# Patient Record
Sex: MALE | Race: WHITE | Employment: OTHER | URBAN - METROPOLITAN AREA
[De-identification: names, ages, dates, MRNs, and addresses within clinical notes are randomized per-mention and may not be internally consistent; named-entity substitution may affect disease eponyms.]

---

## 2017-03-13 ENCOUNTER — OFFICE VISIT (OUTPATIENT)
Dept: INTERNAL MEDICINE | Facility: CLINIC | Age: 72
End: 2017-03-13
Payer: COMMERCIAL

## 2017-03-13 VITALS
WEIGHT: 197 LBS | OXYGEN SATURATION: 97 % | TEMPERATURE: 97.7 F | HEART RATE: 93 BPM | BODY MASS INDEX: 24.62 KG/M2 | DIASTOLIC BLOOD PRESSURE: 98 MMHG | SYSTOLIC BLOOD PRESSURE: 146 MMHG

## 2017-03-13 DIAGNOSIS — H49.21 SIXTH NERVE PALSY OF RIGHT EYE: Primary | ICD-10-CM

## 2017-03-13 DIAGNOSIS — H53.2 DIPLOPIA: ICD-10-CM

## 2017-03-13 PROCEDURE — 99214 OFFICE O/P EST MOD 30 MIN: CPT | Performed by: INTERNAL MEDICINE

## 2017-03-13 NOTE — PROGRESS NOTES
SUBJECTIVE:                                                    Jose Vazquez is a 71 year old male who presents to clinic today for the following health issues:      Eye(s) Problem      Duration: about 1 week    Description:  Location: bilateral  Pain: YES- dual ache   Redness: no   Discharge: no     Accompanying signs and symptoms: headache - very dull    History (Trauma, foreign body exposure,): None    Precipitating or alleviating factors (contact use): None    Therapies tried and outcome: None     Pt reports that when he covers his R or L eye his symptoms resolve. His diplopia is horizontal.    Problem list and histories reviewed & adjusted, as indicated.  Additional history: as documented    Labs reviewed in EPIC    Reviewed and updated as needed this visit by clinical staff  Allergies       Reviewed and updated as needed this visit by Provider         ROS:  Constitutional, HEENT, cardiovascular, pulmonary, gi and gu systems are negative, except as otherwise noted.    OBJECTIVE:                                                    BP (!) 146/98  Pulse 93  Temp 97.7  F (36.5  C) (Oral)  Wt 197 lb (89.4 kg)  SpO2 97%  BMI 24.62 kg/m2  Body mass index is 24.62 kg/(m^2).  GENERAL APPEARANCE: alert and no distress  EYES: EOM- there is loss of abduction R eye. Eyes grossly normal to inspection, fundi benign-no diabetic or hypertensive changes seen and conjunctivae and sclerae normal  HENT: ear canals and TM's normal and nose and mouth without ulcers or lesions  NECK: no adenopathy, no asymmetry, masses, or scars and thyroid normal to palpation  RESP: lungs clear to auscultation - no rales, rhonchi or wheezes  CV: regular rates and rhythm, normal S1 S2, no S3 or S4 and no murmur, click or rub  MS: extremities normal- no gross deformities noted    Diagnostic test results:  none      ASSESSMENT/PLAN:                                                    1. Diplopia  2. Sixth nerve palsy of right eye  - OPHTHALMOLOGY ADULT  REFERRAL. TBS today  -probable MRI brain - but will await opthalmology eval first    Follow up with Provider - after opthalmology eval      Jcarlos Barbosa MD  [facility]

## 2017-03-13 NOTE — NURSING NOTE
"Chief Complaint   Patient presents with     Eye Problem     double vision       Initial BP (!) 146/98  Pulse 93  Temp 97.7  F (36.5  C) (Oral)  Wt 197 lb (89.4 kg)  SpO2 97%  BMI 24.62 kg/m2 Estimated body mass index is 24.62 kg/(m^2) as calculated from the following:    Height as of 1/12/16: 6' 3\" (1.905 m).    Weight as of this encounter: 197 lb (89.4 kg).  Medication Reconciliation: complete     Nasima Lebron, SHADI     "

## 2017-03-13 NOTE — MR AVS SNAPSHOT
After Visit Summary   3/13/2017    Jose Vazquez    MRN: [id number]           Patient Information     Date Of Birth          1945        Visit Information        Provider Department      3/13/2017 7:40 AM Jcarlos Barbosa MD [facility]        Today's Diagnoses     Sixth nerve palsy of right eye    -  1    Diplopia           Follow-ups after your visit        Additional Services     OPHTHALMOLOGY ADULT REFERRAL       Your provider has referred you to:   [facility]  [phone]  [email or web address]  FHN: [facility] [phone] [email or web address]    Please be aware that coverage of these services is subject to the terms and limitations of your health insurance plan.  Call member services at your health plan with any benefit or coverage questions.      Please bring the following with you to your appointment:    (1) Any X-Rays, CTs or MRIs which have been performed.  Contact the facility where they were done to arrange for  prior to your scheduled appointment.    (2) List of current medications  (3) This referral request   (4) Any documents/labs given to you for this referral                  Who to contact     If you have questions or need follow up information about today's clinic visit or your schedule please contact [facility] directly at [phone].  Normal or non-critical lab and imaging results will be communicated to you by MyChart, letter or phone within 4 business days after the clinic has received the results. If you do not hear from us within 7 days, please contact the clinic through MyChart or phone. If you have a critical or abnormal lab result, we will notify you by phone as soon as possible.  Submit refill requests through [employer] or call your pharmacy and they will forward the refill request to us. Please allow 3 business days  "for your refill to be completed.          Additional Information About Your Visit        [employer] Information     [facility] lets you send messages to your doctor, view your test results, renew your prescriptions, schedule appointments and more. To sign up, go to [email or web address] . Click on \"Log in\" on the left side of the screen, which will take you to the Welcome page. Then click on \"Sign up Now\" on the right side of the page.     You will be asked to enter the access code listed below, as well as some personal information. Please follow the directions to create your username and password.     Your access code is: [email or web address]  Expires: 2017  8:30 AM     Your access code will  in 90 days. If you need help or a new code, please call your [place] clinic or [phone].        Care EveryWhere ID     This is your [facility] EveryWhere ID. This could be used by other organizations to access your [place] medical records  [id number]        Your Vitals Were     Pulse Temperature Pulse Oximetry BMI (Body Mass Index)          93 97.7  F (36.5  C) (Oral) 97% 24.62 kg/m2         Blood Pressure from Last 3 Encounters:   17 (!) 146/98   16 132/86   16 (!) 172/100    Weight from Last 3 Encounters:   17 197 lb (89.4 kg)   16 185 lb 12.8 oz (84.3 kg)   16 179 lb 12.8 oz (81.6 kg)              We Performed the Following     OPHTHALMOLOGY ADULT REFERRAL        Primary Care Provider Office Phone # Fax #    Jcarlos Barbosa [phone]       [facility] [address] [facility] [address]        Thank you!     Thank you for choosing [facility]  for your care. Our goal is always to provide you with excellent care. Hearing back from our patients is one way we can continue to improve our services. Please take a few minutes to complete the written survey that you may receive in the mail after your visit with us. Thank you!      "        Your Updated Medication List - Protect others around you: Learn how to safely use, store and throw away your medicines at www.disposemymeds.org.          This list is accurate as of: 3/13/17  8:30 AM.  Always use your most recent med list.                   Brand Name Dispense Instructions for use    albuterol 108 (90 BASE) MCG/ACT Inhaler    PROAIR HFA/PROVENTIL HFA/VENTOLIN HFA    3 Inhaler    Inhale 2 puffs into the lungs every 4 hours as needed for shortness of breath / dyspnea or wheezing       fluticasone-salmeterol 250-50 MCG/DOSE diskus inhaler    ADVAIR    1 Inhaler    Inhale 1 puff into the lungs 2 times daily       lisinopril-hydrochlorothiazide 10-12.5 MG per tablet    PRINZIDE/ZESTORETIC    30 tablet    Take 1 tablet by mouth daily       tiotropium 2.5 MCG/ACT inhalation aerosol    SPIRIVA RESPIMAT    12 g    Inhale 2 puffs into the lungs daily

## 2017-03-17 ENCOUNTER — HOSPITAL ENCOUNTER (OUTPATIENT)
Dept: MRI IMAGING | Facility: CLINIC | Age: 72
End: 2017-03-17
Attending: OPHTHALMOLOGY
Payer: COMMERCIAL

## 2017-03-17 ENCOUNTER — HOSPITAL ENCOUNTER (OUTPATIENT)
Dept: MRI IMAGING | Facility: CLINIC | Age: 72
Discharge: HOME | End: 2017-03-17
Attending: OPHTHALMOLOGY | Admitting: OPHTHALMOLOGY
Payer: COMMERCIAL

## 2017-03-17 DIAGNOSIS — H49.20 SIXTH NERVE PALSY, UNSPECIFIED LATERALITY: ICD-10-CM

## 2017-03-17 PROCEDURE — 25500064 ZZH RX 255 OP 636: Performed by: OPHTHALMOLOGY

## 2017-03-17 PROCEDURE — A9585 GADOBUTROL INJECTION: HCPCS | Performed by: OPHTHALMOLOGY

## 2017-03-17 PROCEDURE — 70553 MRI BRAIN STEM W/O & W/DYE: CPT

## 2017-03-17 PROCEDURE — 70543 MRI ORBT/FAC/NCK W/O &W/DYE: CPT

## 2017-03-17 RX ORDER — GADOBUTROL 604.72 MG/ML
10 INJECTION INTRAVENOUS ONCE
Status: COMPLETED | OUTPATIENT
Start: 2017-03-17 | End: 2017-03-17

## 2017-03-17 RX ADMIN — GADOBUTROL 9 ML: 604.72 INJECTION INTRAVENOUS at 10:35

## 2017-06-01 ENCOUNTER — TRANSFERRED RECORDS (OUTPATIENT)
Dept: HEALTH INFORMATION MANAGEMENT | Facility: CLINIC | Age: 72
End: 2017-06-01

## 2018-09-18 ENCOUNTER — TRANSFERRED RECORDS (OUTPATIENT)
Dept: HEALTH INFORMATION MANAGEMENT | Facility: CLINIC | Age: 73
End: 2018-09-18

## 2019-12-19 ENCOUNTER — OFFICE VISIT (OUTPATIENT)
Dept: URGENT CARE | Facility: URGENT CARE | Age: 74
End: 2019-12-19
Payer: COMMERCIAL

## 2019-12-19 VITALS
DIASTOLIC BLOOD PRESSURE: 92 MMHG | TEMPERATURE: 97.8 F | RESPIRATION RATE: 16 BRPM | SYSTOLIC BLOOD PRESSURE: 142 MMHG | WEIGHT: 187 LBS | HEART RATE: 94 BPM | BODY MASS INDEX: 23.37 KG/M2 | OXYGEN SATURATION: 97 %

## 2019-12-19 DIAGNOSIS — H90.12 CONDUCTIVE HEARING LOSS OF LEFT EAR, UNSPECIFIED HEARING STATUS ON CONTRALATERAL SIDE: ICD-10-CM

## 2019-12-19 DIAGNOSIS — H61.22 IMPACTED CERUMEN OF LEFT EAR: Primary | ICD-10-CM

## 2019-12-19 PROCEDURE — 69209 REMOVE IMPACTED EAR WAX UNI: CPT | Mod: LT | Performed by: PHYSICIAN ASSISTANT

## 2019-12-20 NOTE — PROGRESS NOTES
SUBJECTIVE:  Jose Vazquez is a 74 year old male who presents with left ear blockage and wax for 1 week(s).   Severity: mild and moderate   Timing:still present  Additional symptoms include blockage in ear.      History of recurrent otitis: none    Past Medical History:   Diagnosis Date     Bladder stone      BPH (benign prostatic hyperplasia)      Right acetabular fracture (H)      Patient Active Problem List   Diagnosis     Health Care Home     Community acquired pneumonia     Benign essential hypertension     Pulmonary emphysema, unspecified emphysema type (H)     Right-sided low back pain with right-sided sciatica     Social History     Tobacco Use     Smoking status: Former Smoker     Packs/day: 1.00     Years: 42.00     Pack years: 42.00     Types: Cigarettes     Start date: 1/12/2016     Smokeless tobacco: Never Used   Substance Use Topics     Alcohol use: Yes     Alcohol/week: 0.0 standard drinks     Comment: rarely     Family History   Problem Relation Age of Onset     Family History Negative Mother      Family History Negative Father        ROS:   CONSTITUTIONAL:NEGATIVE for fever, chills, change in weight  INTEGUMENTARY/SKIN: NEGATIVE for worrisome rashes, moles or lesions  EYES: NEGATIVE for vision changes or irritation  ENT/MOUTH: POSITIVE for cerumen left ear  RESP:NEGATIVE for significant cough or SOB  CV: NEGATIVE for chest pain, palpitations or peripheral edema  NEURO: POSITIVE for left ear decreased hearing    OBJECTIVE:  BP (!) 142/92 (BP Location: Right arm)   Pulse 94   Temp 97.8  F (36.6  C) (Oral)   Resp 16   Wt 84.8 kg (187 lb)   SpO2 97%   BMI 23.37 kg/m     EXAM:  The right TM is normal: no effusions, no erythema, and normal landmarks     The right auditory canal is normal and without drainage, edema or erythema  The left TM is not visualized secondary to cerumen  The left auditory canal is obstructed with cerumen  Oropharynx exam is normal: no lesions, erythema, adenopathy or  exudate.  GENERAL: no acute distress  NECK: supple, non-tender to palpation, no adenopathy noted  RESP: lungs clear to auscultation - no rales, rhonchi or wheezes  CV: regular rates and rhythm, normal S1 S2, no murmur noted  SKIN: no suspicious lesions or rashes     ASSESSMENT/PLAN      ICD-10-CM    1. Impacted cerumen of left ear H61.22 HC REMOVAL IMPACTED CERUMEN IRRIGATION/LVG UNILAT   2. Conductive hearing loss of left ear, unspecified hearing status on contralateral side H90.12        Orders Placed This Encounter     HC REMOVAL IMPACTED CERUMEN IRRIGATION/LVG UNILAT     Procedure:  Cerumen removed with left ear by nurse

## 2021-01-14 ENCOUNTER — APPOINTMENT (OUTPATIENT)
Dept: CT IMAGING | Facility: CLINIC | Age: 76
DRG: 064 | End: 2021-01-14
Attending: EMERGENCY MEDICINE
Payer: COMMERCIAL

## 2021-01-14 ENCOUNTER — HOSPITAL ENCOUNTER (INPATIENT)
Facility: CLINIC | Age: 76
LOS: 5 days | Discharge: NURSING, REHAB OR LONG-TERM CARE | DRG: 064 | End: 2021-01-19
Attending: EMERGENCY MEDICINE | Admitting: INTERNAL MEDICINE
Payer: COMMERCIAL

## 2021-01-14 ENCOUNTER — APPOINTMENT (OUTPATIENT)
Dept: MRI IMAGING | Facility: CLINIC | Age: 76
DRG: 064 | End: 2021-01-14
Attending: PSYCHIATRY & NEUROLOGY
Payer: COMMERCIAL

## 2021-01-14 DIAGNOSIS — I63.9 ACUTE ISCHEMIC STROKE (H): ICD-10-CM

## 2021-01-14 DIAGNOSIS — J43.9 PULMONARY EMPHYSEMA, UNSPECIFIED EMPHYSEMA TYPE (H): ICD-10-CM

## 2021-01-14 DIAGNOSIS — I10 BENIGN ESSENTIAL HYPERTENSION: Primary | ICD-10-CM

## 2021-01-14 LAB
ALBUMIN SERPL-MCNC: 3.4 G/DL (ref 3.4–5)
ALP SERPL-CCNC: 99 U/L (ref 40–150)
ALT SERPL W P-5'-P-CCNC: 28 U/L (ref 0–70)
ANION GAP SERPL CALCULATED.3IONS-SCNC: 6 MMOL/L (ref 3–14)
ANION GAP SERPL CALCULATED.3IONS-SCNC: 8 MMOL/L (ref 3–14)
APTT PPP: 30 SEC (ref 22–37)
AST SERPL W P-5'-P-CCNC: 16 U/L (ref 0–45)
BASOPHILS # BLD AUTO: 0.1 10E9/L (ref 0–0.2)
BASOPHILS NFR BLD AUTO: 0.4 %
BILIRUB DIRECT SERPL-MCNC: 0.4 MG/DL (ref 0–0.2)
BILIRUB SERPL-MCNC: 1 MG/DL (ref 0.2–1.3)
BUN SERPL-MCNC: 22 MG/DL (ref 7–30)
BUN SERPL-MCNC: 22 MG/DL (ref 7–30)
CALCIUM SERPL-MCNC: 8.9 MG/DL (ref 8.5–10.1)
CALCIUM SERPL-MCNC: 9.6 MG/DL (ref 8.5–10.1)
CHLORIDE SERPL-SCNC: 103 MMOL/L (ref 94–109)
CHLORIDE SERPL-SCNC: 104 MMOL/L (ref 94–109)
CHOLEST SERPL-MCNC: 151 MG/DL
CO2 SERPL-SCNC: 28 MMOL/L (ref 20–32)
CO2 SERPL-SCNC: 31 MMOL/L (ref 20–32)
CREAT SERPL-MCNC: 1.26 MG/DL (ref 0.66–1.25)
CREAT SERPL-MCNC: 1.41 MG/DL (ref 0.66–1.25)
DIFFERENTIAL METHOD BLD: ABNORMAL
EOSINOPHIL # BLD AUTO: 0 10E9/L (ref 0–0.7)
EOSINOPHIL NFR BLD AUTO: 0.3 %
ERYTHROCYTE [DISTWIDTH] IN BLOOD BY AUTOMATED COUNT: 12.3 % (ref 10–15)
ERYTHROCYTE [DISTWIDTH] IN BLOOD BY AUTOMATED COUNT: 12.4 % (ref 10–15)
GFR SERPL CREATININE-BSD FRML MDRD: 49 ML/MIN/{1.73_M2}
GFR SERPL CREATININE-BSD FRML MDRD: 56 ML/MIN/{1.73_M2}
GLUCOSE SERPL-MCNC: 118 MG/DL (ref 70–99)
GLUCOSE SERPL-MCNC: 119 MG/DL (ref 70–99)
HBA1C MFR BLD: 5.6 % (ref 0–5.6)
HCT VFR BLD AUTO: 45.4 % (ref 40–53)
HCT VFR BLD AUTO: 49.7 % (ref 40–53)
HDLC SERPL-MCNC: 39 MG/DL
HGB BLD-MCNC: 15.2 G/DL (ref 13.3–17.7)
HGB BLD-MCNC: 16.3 G/DL (ref 13.3–17.7)
IMM GRANULOCYTES # BLD: 0.1 10E9/L (ref 0–0.4)
IMM GRANULOCYTES NFR BLD: 0.5 %
INR PPP: 1.12 (ref 0.86–1.14)
INTERPRETATION ECG - MUSE: NORMAL
LACTATE BLD-SCNC: 1.6 MMOL/L (ref 0.7–2)
LDLC SERPL CALC-MCNC: 93 MG/DL
LYMPHOCYTES # BLD AUTO: 1.1 10E9/L (ref 0.8–5.3)
LYMPHOCYTES NFR BLD AUTO: 8.7 %
MAGNESIUM SERPL-MCNC: 2.2 MG/DL (ref 1.6–2.3)
MCH RBC QN AUTO: 28.9 PG (ref 26.5–33)
MCH RBC QN AUTO: 29 PG (ref 26.5–33)
MCHC RBC AUTO-ENTMCNC: 32.8 G/DL (ref 31.5–36.5)
MCHC RBC AUTO-ENTMCNC: 33.5 G/DL (ref 31.5–36.5)
MCV RBC AUTO: 87 FL (ref 78–100)
MCV RBC AUTO: 88 FL (ref 78–100)
MONOCYTES # BLD AUTO: 1.4 10E9/L (ref 0–1.3)
MONOCYTES NFR BLD AUTO: 10.8 %
NEUTROPHILS # BLD AUTO: 9.9 10E9/L (ref 1.6–8.3)
NEUTROPHILS NFR BLD AUTO: 79.3 %
NONHDLC SERPL-MCNC: 112 MG/DL
NRBC # BLD AUTO: 0 10*3/UL
NRBC BLD AUTO-RTO: 0 /100
PLATELET # BLD AUTO: 249 10E9/L (ref 150–450)
PLATELET # BLD AUTO: 298 10E9/L (ref 150–450)
POTASSIUM SERPL-SCNC: 2.8 MMOL/L (ref 3.4–5.3)
POTASSIUM SERPL-SCNC: 2.9 MMOL/L (ref 3.4–5.3)
PROT SERPL-MCNC: 7.7 G/DL (ref 6.8–8.8)
RBC # BLD AUTO: 5.25 10E12/L (ref 4.4–5.9)
RBC # BLD AUTO: 5.64 10E12/L (ref 4.4–5.9)
SODIUM SERPL-SCNC: 139 MMOL/L (ref 133–144)
SODIUM SERPL-SCNC: 141 MMOL/L (ref 133–144)
TRIGL SERPL-MCNC: 93 MG/DL
WBC # BLD AUTO: 12.5 10E9/L (ref 4–11)
WBC # BLD AUTO: 12.6 10E9/L (ref 4–11)

## 2021-01-14 PROCEDURE — 250N000011 HC RX IP 250 OP 636: Performed by: EMERGENCY MEDICINE

## 2021-01-14 PROCEDURE — 80048 BASIC METABOLIC PNL TOTAL CA: CPT | Performed by: INTERNAL MEDICINE

## 2021-01-14 PROCEDURE — 250N000013 HC RX MED GY IP 250 OP 250 PS 637: Performed by: EMERGENCY MEDICINE

## 2021-01-14 PROCEDURE — 83735 ASSAY OF MAGNESIUM: CPT | Performed by: INTERNAL MEDICINE

## 2021-01-14 PROCEDURE — 0042T CT HEAD PERFUSION WITH CONTRAST: CPT

## 2021-01-14 PROCEDURE — A9585 GADOBUTROL INJECTION: HCPCS | Performed by: EMERGENCY MEDICINE

## 2021-01-14 PROCEDURE — 87635 SARS-COV-2 COVID-19 AMP PRB: CPT | Performed by: INTERNAL MEDICINE

## 2021-01-14 PROCEDURE — 83036 HEMOGLOBIN GLYCOSYLATED A1C: CPT | Performed by: INTERNAL MEDICINE

## 2021-01-14 PROCEDURE — 99223 1ST HOSP IP/OBS HIGH 75: CPT | Mod: AI | Performed by: INTERNAL MEDICINE

## 2021-01-14 PROCEDURE — G0508 CRIT CARE TELEHEA CONSULT 60: HCPCS | Mod: G0 | Performed by: PSYCHIATRY & NEUROLOGY

## 2021-01-14 PROCEDURE — 70496 CT ANGIOGRAPHY HEAD: CPT

## 2021-01-14 PROCEDURE — 255N000002 HC RX 255 OP 636: Performed by: EMERGENCY MEDICINE

## 2021-01-14 PROCEDURE — 96375 TX/PRO/DX INJ NEW DRUG ADDON: CPT

## 2021-01-14 PROCEDURE — 36415 COLL VENOUS BLD VENIPUNCTURE: CPT | Performed by: INTERNAL MEDICINE

## 2021-01-14 PROCEDURE — 99291 CRITICAL CARE FIRST HOUR: CPT | Mod: 25

## 2021-01-14 PROCEDURE — 83605 ASSAY OF LACTIC ACID: CPT | Performed by: INTERNAL MEDICINE

## 2021-01-14 PROCEDURE — 258N000003 HC RX IP 258 OP 636: Performed by: INTERNAL MEDICINE

## 2021-01-14 PROCEDURE — 70450 CT HEAD/BRAIN W/O DYE: CPT

## 2021-01-14 PROCEDURE — 120N000001 HC R&B MED SURG/OB

## 2021-01-14 PROCEDURE — 70553 MRI BRAIN STEM W/O & W/DYE: CPT

## 2021-01-14 PROCEDURE — 93005 ELECTROCARDIOGRAM TRACING: CPT

## 2021-01-14 PROCEDURE — 85610 PROTHROMBIN TIME: CPT | Performed by: EMERGENCY MEDICINE

## 2021-01-14 PROCEDURE — 80076 HEPATIC FUNCTION PANEL: CPT | Performed by: INTERNAL MEDICINE

## 2021-01-14 PROCEDURE — 80061 LIPID PANEL: CPT | Performed by: INTERNAL MEDICINE

## 2021-01-14 PROCEDURE — 80048 BASIC METABOLIC PNL TOTAL CA: CPT | Performed by: EMERGENCY MEDICINE

## 2021-01-14 PROCEDURE — 250N000011 HC RX IP 250 OP 636: Performed by: HOSPITALIST

## 2021-01-14 PROCEDURE — 85730 THROMBOPLASTIN TIME PARTIAL: CPT | Performed by: EMERGENCY MEDICINE

## 2021-01-14 PROCEDURE — 96374 THER/PROPH/DIAG INJ IV PUSH: CPT | Mod: 59

## 2021-01-14 PROCEDURE — 85027 COMPLETE CBC AUTOMATED: CPT | Performed by: INTERNAL MEDICINE

## 2021-01-14 PROCEDURE — 85025 COMPLETE CBC W/AUTO DIFF WBC: CPT | Performed by: EMERGENCY MEDICINE

## 2021-01-14 PROCEDURE — 250N000009 HC RX 250: Performed by: EMERGENCY MEDICINE

## 2021-01-14 RX ORDER — LABETALOL HYDROCHLORIDE 5 MG/ML
20 INJECTION, SOLUTION INTRAVENOUS ONCE
Status: COMPLETED | OUTPATIENT
Start: 2021-01-14 | End: 2021-01-14

## 2021-01-14 RX ORDER — HYDRALAZINE HYDROCHLORIDE 20 MG/ML
10-20 INJECTION INTRAMUSCULAR; INTRAVENOUS
Status: DISCONTINUED | OUTPATIENT
Start: 2021-01-14 | End: 2021-01-19 | Stop reason: HOSPADM

## 2021-01-14 RX ORDER — ACETAMINOPHEN 325 MG/1
650 TABLET ORAL EVERY 4 HOURS PRN
Status: DISCONTINUED | OUTPATIENT
Start: 2021-01-14 | End: 2021-01-19 | Stop reason: HOSPADM

## 2021-01-14 RX ORDER — GADOBUTROL 604.72 MG/ML
8 INJECTION INTRAVENOUS ONCE
Status: COMPLETED | OUTPATIENT
Start: 2021-01-14 | End: 2021-01-14

## 2021-01-14 RX ORDER — ASPIRIN 325 MG
325 TABLET ORAL ONCE
Status: COMPLETED | OUTPATIENT
Start: 2021-01-14 | End: 2021-01-14

## 2021-01-14 RX ORDER — PROCHLORPERAZINE MALEATE 5 MG
5 TABLET ORAL EVERY 6 HOURS PRN
Status: DISCONTINUED | OUTPATIENT
Start: 2021-01-14 | End: 2021-01-19 | Stop reason: HOSPADM

## 2021-01-14 RX ORDER — POTASSIUM CHLORIDE 7.45 MG/ML
10 INJECTION INTRAVENOUS
Status: COMPLETED | OUTPATIENT
Start: 2021-01-14 | End: 2021-01-15

## 2021-01-14 RX ORDER — ONDANSETRON 2 MG/ML
4 INJECTION INTRAMUSCULAR; INTRAVENOUS EVERY 6 HOURS PRN
Status: DISCONTINUED | OUTPATIENT
Start: 2021-01-14 | End: 2021-01-19 | Stop reason: HOSPADM

## 2021-01-14 RX ORDER — PROCHLORPERAZINE 25 MG
12.5 SUPPOSITORY, RECTAL RECTAL EVERY 12 HOURS PRN
Status: DISCONTINUED | OUTPATIENT
Start: 2021-01-14 | End: 2021-01-19 | Stop reason: HOSPADM

## 2021-01-14 RX ORDER — SODIUM CHLORIDE 9 MG/ML
INJECTION, SOLUTION INTRAVENOUS CONTINUOUS
Status: ACTIVE | OUTPATIENT
Start: 2021-01-14 | End: 2021-01-15

## 2021-01-14 RX ORDER — ALBUTEROL SULFATE 90 UG/1
2 AEROSOL, METERED RESPIRATORY (INHALATION) EVERY 4 HOURS PRN
Status: DISCONTINUED | OUTPATIENT
Start: 2021-01-14 | End: 2021-01-19 | Stop reason: HOSPADM

## 2021-01-14 RX ORDER — POLYETHYLENE GLYCOL 3350 17 G/17G
17 POWDER, FOR SOLUTION ORAL DAILY PRN
Status: DISCONTINUED | OUTPATIENT
Start: 2021-01-14 | End: 2021-01-19 | Stop reason: HOSPADM

## 2021-01-14 RX ORDER — LABETALOL HYDROCHLORIDE 5 MG/ML
10-40 INJECTION, SOLUTION INTRAVENOUS EVERY 10 MIN PRN
Status: DISCONTINUED | OUTPATIENT
Start: 2021-01-14 | End: 2021-01-19 | Stop reason: HOSPADM

## 2021-01-14 RX ORDER — POTASSIUM CHLORIDE 7.45 MG/ML
10 INJECTION INTRAVENOUS ONCE
Status: COMPLETED | OUTPATIENT
Start: 2021-01-14 | End: 2021-01-14

## 2021-01-14 RX ORDER — ONDANSETRON 4 MG/1
4 TABLET, ORALLY DISINTEGRATING ORAL EVERY 6 HOURS PRN
Status: DISCONTINUED | OUTPATIENT
Start: 2021-01-14 | End: 2021-01-19 | Stop reason: HOSPADM

## 2021-01-14 RX ORDER — ATORVASTATIN CALCIUM 40 MG/1
40 TABLET, FILM COATED ORAL EVERY EVENING
Status: DISCONTINUED | OUTPATIENT
Start: 2021-01-14 | End: 2021-01-19 | Stop reason: HOSPADM

## 2021-01-14 RX ORDER — IOPAMIDOL 755 MG/ML
120 INJECTION, SOLUTION INTRAVASCULAR ONCE
Status: COMPLETED | OUTPATIENT
Start: 2021-01-14 | End: 2021-01-14

## 2021-01-14 RX ADMIN — POTASSIUM CHLORIDE 10 MEQ: 7.46 INJECTION, SOLUTION INTRAVENOUS at 17:53

## 2021-01-14 RX ADMIN — SODIUM CHLORIDE: 9 INJECTION, SOLUTION INTRAVENOUS at 19:02

## 2021-01-14 RX ADMIN — LABETALOL HYDROCHLORIDE 20 MG: 5 INJECTION, SOLUTION INTRAVENOUS at 18:15

## 2021-01-14 RX ADMIN — POTASSIUM CHLORIDE 10 MEQ: 7.46 INJECTION, SOLUTION INTRAVENOUS at 23:07

## 2021-01-14 RX ADMIN — IOPAMIDOL 120 ML: 755 INJECTION, SOLUTION INTRAVENOUS at 15:01

## 2021-01-14 RX ADMIN — GADOBUTROL 8 ML: 604.72 INJECTION INTRAVENOUS at 16:53

## 2021-01-14 RX ADMIN — ASPIRIN 325 MG ORAL TABLET 325 MG: 325 PILL ORAL at 18:12

## 2021-01-14 RX ADMIN — SODIUM CHLORIDE 100 ML: 9 INJECTION, SOLUTION INTRAVENOUS at 15:01

## 2021-01-14 RX ADMIN — POTASSIUM CHLORIDE 10 MEQ: 7.46 INJECTION, SOLUTION INTRAVENOUS at 21:57

## 2021-01-14 ASSESSMENT — ENCOUNTER SYMPTOMS
HEADACHES: 0
NAUSEA: 0
SPEECH DIFFICULTY: 1
DECREASED CONCENTRATION: 1

## 2021-01-14 ASSESSMENT — MIFFLIN-ST. JEOR: SCORE: 1574.08

## 2021-01-14 ASSESSMENT — ACTIVITIES OF DAILY LIVING (ADL): ADLS_ACUITY_SCORE: 12
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
## 2021-01-14 NOTE — ED NOTES
Bed: ST03  Expected date:   Expected time:   Means of arrival:   Comments:  [facility] - 517 - 75 M stroke alert no covid eta [id number]
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
## 2021-01-14 NOTE — ED NOTES
Bed: ST02  Expected date:   Expected time:   Means of arrival:   Comments:  Marixa - 514 - 89 F stroke alert eta [id number]

## 2021-01-14 NOTE — ED NOTES
[facility]  ED Nurse Handoff Report    ED Chief complaint: Cerebrovascular Accident      ED Diagnosis:   Final diagnoses:   None       Code Status: Full Code    Allergies: No Known Allergies    Patient Story: Patient comes in with stroke like s/s.  CT done, symptoms have resolved.  Patient getting MRI, VSS.   Labs Ordered and Resulted from Time of ED Arrival Up to the Time of Departure from the ED   BASIC METABOLIC PANEL - Abnormal; Notable for the following components:       Result Value    Potassium 2.8 (*)     Glucose 119 (*)     Creatinine 1.41 (*)     GFR Estimate 49 (*)     GFR Estimate If Black 57 (*)     All other components within normal limits   CBC WITH PLATELETS DIFFERENTIAL - Abnormal; Notable for the following components:    WBC 12.5 (*)     Absolute Neutrophil 9.9 (*)     Absolute Monocytes 1.4 (*)     All other components within normal limits   INR   PARTIAL THROMBOPLASTIN TIME   VITAL SIGNS AND NEURO CHECKS       Focused Assessment:  Neuro    Treatments and/or interventions provided: Mon[place]ing  Patient's response to treatments and/or interventions: fair    To be done/followed up on inpatient unit:  NA    Does this patient have any cognitive concerns?: Alert and Orientated x4    Activity level - Baseline/Home:  Independent  Activity Level - Current:   Stand with Assist    Patient's Preferred language: English   Needed?: No    Isolation: None  Infection: Not Applicable  Patient tested for COVID 19 prior to admission: YES  Bariatric?: No    Vital Signs:   Vitals:    01/14/21 1454 01/14/21 1455 01/14/21 1530 01/14/21 1545   BP:   (!) 137/90 (!) 134/106   Pulse:  106 113 101   Resp:  28 28 15   Temp: 98.7  F (37.1  C)      TempSrc: Oral      SpO2:   94%        Cardiac Rhythm:     Was the PSS-3 completed:   Yes  What interventions are required if any?               Family Comments: NA  OBS brochure/video discussed/provided to patient/family: Yes              Name of person  given brochure if not patient: NA              Relationship to patient: NA    For the majority of the shift this patient's behavior was Green.   Behavioral interventions performed were NA.    ED NURSE PHONE NUMBER: [id number]       '

## 2021-01-15 ENCOUNTER — APPOINTMENT (OUTPATIENT)
Dept: SPEECH THERAPY | Facility: CLINIC | Age: 76
DRG: 064 | End: 2021-01-15
Attending: INTERNAL MEDICINE
Payer: COMMERCIAL

## 2021-01-15 ENCOUNTER — APPOINTMENT (OUTPATIENT)
Dept: OCCUPATIONAL THERAPY | Facility: CLINIC | Age: 76
DRG: 064 | End: 2021-01-15
Attending: INTERNAL MEDICINE
Payer: COMMERCIAL

## 2021-01-15 ENCOUNTER — APPOINTMENT (OUTPATIENT)
Dept: CARDIOLOGY | Facility: CLINIC | Age: 76
DRG: 064 | End: 2021-01-15
Attending: INTERNAL MEDICINE
Payer: COMMERCIAL

## 2021-01-15 ENCOUNTER — APPOINTMENT (OUTPATIENT)
Dept: PHYSICAL THERAPY | Facility: CLINIC | Age: 76
DRG: 064 | End: 2021-01-15
Attending: INTERNAL MEDICINE
Payer: COMMERCIAL

## 2021-01-15 ENCOUNTER — HOSPITAL ENCOUNTER (OUTPATIENT)
Dept: NEUROLOGY | Facility: CLINIC | Age: 76
DRG: 064 | End: 2021-01-15
Attending: PHYSICIAN ASSISTANT
Payer: COMMERCIAL

## 2021-01-15 LAB
ANION GAP SERPL CALCULATED.3IONS-SCNC: 5 MMOL/L (ref 3–14)
BASOPHILS # BLD AUTO: 0.1 10E9/L (ref 0–0.2)
BASOPHILS NFR BLD AUTO: 0.6 %
BUN SERPL-MCNC: 21 MG/DL (ref 7–30)
CALCIUM SERPL-MCNC: 8.8 MG/DL (ref 8.5–10.1)
CHLORIDE SERPL-SCNC: 107 MMOL/L (ref 94–109)
CO2 SERPL-SCNC: 28 MMOL/L (ref 20–32)
CREAT SERPL-MCNC: 1.1 MG/DL (ref 0.66–1.25)
DIFFERENTIAL METHOD BLD: NORMAL
EOSINOPHIL # BLD AUTO: 0.1 10E9/L (ref 0–0.7)
EOSINOPHIL NFR BLD AUTO: 1.3 %
ERYTHROCYTE [DISTWIDTH] IN BLOOD BY AUTOMATED COUNT: 12.5 % (ref 10–15)
GFR SERPL CREATININE-BSD FRML MDRD: 66 ML/MIN/{1.73_M2}
GLUCOSE BLDC GLUCOMTR-MCNC: 103 MG/DL (ref 70–99)
GLUCOSE BLDC GLUCOMTR-MCNC: 107 MG/DL (ref 70–99)
GLUCOSE BLDC GLUCOMTR-MCNC: 112 MG/DL (ref 70–99)
GLUCOSE SERPL-MCNC: 107 MG/DL (ref 70–99)
HCT VFR BLD AUTO: 43.6 % (ref 40–53)
HGB BLD-MCNC: 14.5 G/DL (ref 13.3–17.7)
IMM GRANULOCYTES # BLD: 0.1 10E9/L (ref 0–0.4)
IMM GRANULOCYTES NFR BLD: 0.6 %
LABORATORY COMMENT REPORT: NORMAL
LYMPHOCYTES # BLD AUTO: 1.1 10E9/L (ref 0.8–5.3)
LYMPHOCYTES NFR BLD AUTO: 10.6 %
MCH RBC QN AUTO: 29.1 PG (ref 26.5–33)
MCHC RBC AUTO-ENTMCNC: 33.3 G/DL (ref 31.5–36.5)
MCV RBC AUTO: 87 FL (ref 78–100)
MONOCYTES # BLD AUTO: 1.1 10E9/L (ref 0–1.3)
MONOCYTES NFR BLD AUTO: 10.4 %
NEUTROPHILS # BLD AUTO: 7.9 10E9/L (ref 1.6–8.3)
NEUTROPHILS NFR BLD AUTO: 76.5 %
NRBC # BLD AUTO: 0 10*3/UL
NRBC BLD AUTO-RTO: 0 /100
PLATELET # BLD AUTO: 235 10E9/L (ref 150–450)
POTASSIUM SERPL-SCNC: 3.2 MMOL/L (ref 3.4–5.3)
POTASSIUM SERPL-SCNC: 3.4 MMOL/L (ref 3.4–5.3)
POTASSIUM SERPL-SCNC: 3.4 MMOL/L (ref 3.4–5.3)
RBC # BLD AUTO: 4.99 10E12/L (ref 4.4–5.9)
SARS-COV-2 RNA RESP QL NAA+PROBE: NEGATIVE
SODIUM SERPL-SCNC: 140 MMOL/L (ref 133–144)
SPECIMEN SOURCE: NORMAL
WBC # BLD AUTO: 10.3 10E9/L (ref 4–11)

## 2021-01-15 PROCEDURE — 999N000052 EEG VIDEO 12-26 HR UNMONITORED

## 2021-01-15 PROCEDURE — 84132 ASSAY OF SERUM POTASSIUM: CPT | Performed by: HOSPITALIST

## 2021-01-15 PROCEDURE — 250N000013 HC RX MED GY IP 250 OP 250 PS 637: Performed by: INTERNAL MEDICINE

## 2021-01-15 PROCEDURE — 250N000011 HC RX IP 250 OP 636: Performed by: HOSPITALIST

## 2021-01-15 PROCEDURE — 99233 SBSQ HOSP IP/OBS HIGH 50: CPT | Performed by: PSYCHIATRY & NEUROLOGY

## 2021-01-15 PROCEDURE — 97166 OT EVAL MOD COMPLEX 45 MIN: CPT | Mod: GO | Performed by: OCCUPATIONAL THERAPIST

## 2021-01-15 PROCEDURE — 120N000001 HC R&B MED SURG/OB

## 2021-01-15 PROCEDURE — 250N000013 HC RX MED GY IP 250 OP 250 PS 637: Performed by: HOSPITALIST

## 2021-01-15 PROCEDURE — 999N001017 HC STATISTIC GLUCOSE BY METER IP

## 2021-01-15 PROCEDURE — 92526 ORAL FUNCTION THERAPY: CPT | Mod: GN | Performed by: SPEECH-LANGUAGE PATHOLOGIST

## 2021-01-15 PROCEDURE — 85025 COMPLETE CBC W/AUTO DIFF WBC: CPT | Performed by: INTERNAL MEDICINE

## 2021-01-15 PROCEDURE — 999N000208 ECHOCARDIOGRAM COMPLETE

## 2021-01-15 PROCEDURE — 97535 SELF CARE MNGMENT TRAINING: CPT | Mod: GO | Performed by: OCCUPATIONAL THERAPIST

## 2021-01-15 PROCEDURE — 97116 GAIT TRAINING THERAPY: CPT | Mod: GP | Performed by: PHYSICAL THERAPIST

## 2021-01-15 PROCEDURE — 36415 COLL VENOUS BLD VENIPUNCTURE: CPT | Performed by: HOSPITALIST

## 2021-01-15 PROCEDURE — 80048 BASIC METABOLIC PNL TOTAL CA: CPT | Performed by: INTERNAL MEDICINE

## 2021-01-15 PROCEDURE — 97530 THERAPEUTIC ACTIVITIES: CPT | Mod: GP | Performed by: PHYSICAL THERAPIST

## 2021-01-15 PROCEDURE — 255N000002 HC RX 255 OP 636: Performed by: INTERNAL MEDICINE

## 2021-01-15 PROCEDURE — 99232 SBSQ HOSP IP/OBS MODERATE 35: CPT | Performed by: HOSPITALIST

## 2021-01-15 PROCEDURE — 250N000013 HC RX MED GY IP 250 OP 250 PS 637: Performed by: PHYSICIAN ASSISTANT

## 2021-01-15 PROCEDURE — 95720 EEG PHY/QHP EA INCR W/VEEG: CPT | Performed by: PSYCHIATRY & NEUROLOGY

## 2021-01-15 PROCEDURE — 36415 COLL VENOUS BLD VENIPUNCTURE: CPT | Performed by: INTERNAL MEDICINE

## 2021-01-15 PROCEDURE — 97161 PT EVAL LOW COMPLEX 20 MIN: CPT | Mod: GP | Performed by: PHYSICAL THERAPIST

## 2021-01-15 PROCEDURE — 93306 TTE W/DOPPLER COMPLETE: CPT | Mod: 26 | Performed by: INTERNAL MEDICINE

## 2021-01-15 PROCEDURE — 92610 EVALUATE SWALLOWING FUNCTION: CPT | Mod: GN | Performed by: SPEECH-LANGUAGE PATHOLOGIST

## 2021-01-15 RX ORDER — POTASSIUM CHLORIDE 1.5 G/1.58G
40 POWDER, FOR SOLUTION ORAL ONCE
Status: COMPLETED | OUTPATIENT
Start: 2021-01-15 | End: 2021-01-15

## 2021-01-15 RX ORDER — POTASSIUM CHLORIDE 1500 MG/1
40 TABLET, EXTENDED RELEASE ORAL ONCE
Status: CANCELLED | OUTPATIENT
Start: 2021-01-15 | End: 2021-01-15

## 2021-01-15 RX ORDER — POTASSIUM CHLORIDE 1500 MG/1
40 TABLET, EXTENDED RELEASE ORAL ONCE
Status: COMPLETED | OUTPATIENT
Start: 2021-01-15 | End: 2021-01-15

## 2021-01-15 RX ORDER — ASPIRIN 325 MG
325 TABLET ORAL DAILY
Status: DISCONTINUED | OUTPATIENT
Start: 2021-01-15 | End: 2021-01-19 | Stop reason: HOSPADM

## 2021-01-15 RX ADMIN — POTASSIUM CHLORIDE 40 MEQ: 1500 TABLET, EXTENDED RELEASE ORAL at 18:26

## 2021-01-15 RX ADMIN — POTASSIUM CHLORIDE 10 MEQ: 7.46 INJECTION, SOLUTION INTRAVENOUS at 01:34

## 2021-01-15 RX ADMIN — POTASSIUM CHLORIDE 10 MEQ: 7.46 INJECTION, SOLUTION INTRAVENOUS at 03:09

## 2021-01-15 RX ADMIN — ATORVASTATIN CALCIUM 40 MG: 40 TABLET, FILM COATED ORAL at 19:38

## 2021-01-15 RX ADMIN — POTASSIUM CHLORIDE 40 MEQ: 1.5 POWDER, FOR SOLUTION ORAL at 11:16

## 2021-01-15 RX ADMIN — POTASSIUM CHLORIDE 10 MEQ: 7.46 INJECTION, SOLUTION INTRAVENOUS at 04:17

## 2021-01-15 RX ADMIN — HUMAN ALBUMIN MICROSPHERES AND PERFLUTREN 9 ML: 10; .22 INJECTION, SOLUTION INTRAVENOUS at 09:51

## 2021-01-15 RX ADMIN — POTASSIUM CHLORIDE 10 MEQ: 7.46 INJECTION, SOLUTION INTRAVENOUS at 00:19

## 2021-01-15 RX ADMIN — ASPIRIN 325 MG ORAL TABLET 325 MG: 325 PILL ORAL at 14:01

## 2021-01-15 ASSESSMENT — ACTIVITIES OF DAILY LIVING (ADL)
ADLS_ACUITY_SCORE: 12
PREVIOUS_RESPONSIBILITIES: MEAL PREP;HOUSEKEEPING;LAUNDRY;SHOPPING;YARDWORK;MEDICATION MANAGEMENT;FINANCES;DRIVING
ADLS_ACUITY_SCORE: 12

## 2021-01-15 ASSESSMENT — MIFFLIN-ST. JEOR: SCORE: 1582.24

## 2021-01-15 NOTE — PHARMACY-ADMISSION MEDICATION HISTORY
Pharmacy Medication History  Admission medication history interview status for the 1/14/2021  admission is complete. See EPIC admission navigator for prior to admission medications       Medication history sources: Patient  Adherence Assessment: Good    Significant changes made to the medication list:  Per patient he is not on any medications       Additional medication history information:       Medication reconciliation completed by provider prior to medication history? Yes    Time spent in this activity: 2min       Prior to Admission medications    Not on File       The information provided in this note is only as accurate as the sources available at the time of the update(s).   Bianca TamezD

## 2021-01-15 NOTE — PROVIDER NOTIFICATION
"Text page sent to hospitalist, \"FYI K level 3.2. Do you want to order protocol and replace?\"      Addendum: MD placed orders  "

## 2021-01-15 NOTE — PROGRESS NOTES
"   01/15/21 0856   General Information   Onset of Illness/Injury or Date of Surgery 01/14/21   Referring Physician Chapito Corcoran MD   Behavioral Issues   (impulsivity)   Patient/Family Therapy Goal Statement (SLP) Patient did not state.   Pertinent History of Current Problem Mr. Jose Vazquez is a 75-year-old male patient with history including hypertension, COPD, BPH, who does not follow with a doctor regularly and is not on medications, who presents with abrupt onset of speech impairment and gait disturbance and found to have suffered an acute stroke. The patient was in his usual state of health up until about 3 hours prior to his arrival here, when he noted some difficulties with speech and word finding.  Thirty minutes prior to arrival, he was at his local gas station where the attendant who knew the patient well notes that he was \"off\" and walking oddly. MRI was performed that showed an area of restricted diffusion in the left basal ganglia with associated enhancement likely representing subacute infarct; new foci of susceptibility hypointensity in the left basal ganglia along the superior margin of the region of infarct that could have represented petechial hemorrhage within the infarct; more chronic-appearing infarct in the inferior medial right cerebellum; there were also multiple probable chronic microhemorrhages within both cerebral hemispheres and the cerebellum.    General Observations Patient cooperative but note confusion. Patient not able to state date, place or situation. Note word-finding difficulty. Note impulsivity with urge to use bathroom.   Past History of Dysphagia none per chart review   Type of Evaluation   Type of Evaluation Swallow Evaluation   Oral Motor   Oral Musculature generally intact   Structural Abnormalities none present   Mucosal Quality adequate   Dentition (Oral Motor)   Dentition (Oral Motor) natural dentition   Facial Symmetry (Oral Motor)   Facial Symmetry (Oral Motor) " WNL   Lip Function (Oral Motor)   Lip Range of Motion (Oral Motor) unable/difficult to assess  (due to confusion)   Tongue Function (Oral Motor)   Tongue ROM (Oral Motor) WNL   Jaw Function (Oral Motor)   Jaw Function (Oral Motor) WNL   Cough/Swallow/Gag Reflex (Oral Motor)   Soft Palate/Velum (Oral Motor) WNL   Volitional Throat Clear/Cough (Oral Motor) WNL   Volitional Swallow (Oral Motor) mildly delayed   Vocal Quality/Secretion Management (Oral Motor)   Vocal Quality (Oral Motor) hoarse;breathy  (mild)   Secretion Management (Oral Motor) WNL   General Swallowing Observations   Current Diet/Method of Nutritional Intake (General Swallowing Observations, NIS) NPO   Respiratory Support (General Swallowing Observations) none   Swallowing Evaluation Clinical swallow evaluation   Clinical Swallow Evaluation   Feeding Assistance set up only required   Additional evaluation(s) completed today No   Clinical Swallow Evaluation Textures Trialed Thin Liquids;Puree Textures;Semi-Solid;Solid Foods   Clinical Swallow Eval: Thin Liquid Texture Trial   Mode of Presentation, Thin Liquids spoon;cup;straw;fed by clinician;self-fed   Volume of Liquid or Food Presented 2 ice chips; 5 oz water by spoon, cup or straw   Oral Phase of Swallow WFL   Pharyngeal Phase of Swallow intact   Diagnostic Statement No overt aspiration signs occurred (no coughing, no throat clearing, vocal quality and breath sounds unchanged).   Clinical Swallow Evaluation: Puree Solid Texture Trial   Mode of Presentation, Puree spoon;self-fed   Volume of Puree Presented 2 oz applesauce   Oral Phase, Puree WFL   Pharyngeal Phase, Puree intact   Diagnostic Statement No overt aspiration signs occurred (no coughing, no throat clearing, vocal quality and breath sounds unchanged).   Clinical Swallow Evaluation: Semisolid Texture Trial   Mode of Presentation, Semisolid spoon;self-fed   Volume of Semisolid Food Presented 1 oz   Oral Phase, Semisolid WFL   Pharyngeal Phase,  Semisolid intact   Diagnostic Statement No overt aspiration signs occurred (no coughing, no throat clearing, vocal quality and breath sounds unchanged).   Clinical Swallow Evaluation: Solid Food Texture Trial   Mode of Presentation, Solid self-fed   Volume of Solid Food Presented 1/2 pooja cracker square   Oral Phase, Solid Poor AP movement  (mild)   Pharyngeal Phase, Solid intact   Diagnostic Statement No overt aspiration signs occurred (no coughing, no throat clearing, vocal quality and breath sounds unchanged).   Esophageal Phase of Swallow   Patient reports or presents with symptoms of esophageal dysphagia No   Swallowing Recommendations   Diet Consistency Recommendations dysphagia level 3 (advanced);thin liquids   Supervision Level for Intake close supervision needed  (initially due to confusion)   Mode of Delivery Recommendations bolus size, small;slow rate of intake   Recommended Feeding/Eating Techniques (Swallow Eval) maintain upright posture during/after eating for 30 minutes;set-up and prepare tray   Medication Administration Recommendations, Swallowing (SLP) Please serve pills one at a time.   General Therapy Interventions   Planned Therapy Interventions Dysphagia Treatment   Dysphagia treatment Modified diet education;Instruction of safe swallow strategies   SLP Therapy Assessment/Plan   Criteria for Skilled Therapeutic Interventions Met (SLP Eval) yes;treatment indicated   SLP Diagnosis Mild dysphagia   Rehab Potential (SLP Eval) good, to achieve stated therapy goals   Therapy Frequency (SLP Eval) daily   Predicted Duration of Therapy Intervention (SLP Eval) 1 week   Comment, Therapy Assessment/Plan (SLP) Patient presents with mild dysphagia characterized by mildly reduced oral bolus coordination/control with solid texture. No overt aspiration signs occurred across consistencies (no coughing, no throat clearing, vocal quality and breath sounds unchanged). No oral residue remained. Swallow response  appeared timely and laryngeal elevation appeared adequate. Recommend initiate dysphagia diet level 3 with thin liquids given set up assist and initial close supervision due to confusion and impulsivity. Patient to be seated fully upright, take small bites/sips, eat at slow pace. Please serve pills one at a time.   Therapy Plan Review/Discharge Plan (SLP)   Therapy Plan Review (SLP) evaluation/treatment results reviewed;care plan/treatment goals reviewed;risks/benefits reviewed;current/potential barriers reviewed;participants voiced agreement with care plan;participants included;patient   Demonstrates Need for Referral to Another Service (SLP) occupational therapist;physical therapist   SLP Discharge Planning    SLP Discharge Recommendation (DC Rec) [facility] patient will benefit from intensive, interdisciplinary therapy.  Anticipate will be able to tolerate 3 hours of therapy per day   SLP Rationale for DC Rec Patient below baseline for swallowing and communication.   SLP Brief overview of current status  Recommend initiate dysphagia diet level 3 with thin liquids given set up assist and initial close supervision due to confusion and impulsivity. Patient to be seated fully upright, take small bites/sips, eat at slow pace. Please serve pills one at a time. Noting word-finding difficulty and will complete speech/langauge evaluation.    Total Evaluation Time   Total Evaluation Time (Minutes) 12

## 2021-01-15 NOTE — PLAN OF CARE
"Pt here for acute stroke. Alert, disoriented to time and situation. Neuro's slow/hesitant speech, WFD, not able to follow all commands, stating \"NO\" when asked to follow certain commands. Denies any N&T. VSS on RA. Tele NSR. Tolerating DD3 with thin liquid diet. K replaced for level 3.2, recheck for 1530. Denies any Pain. 24 hour continuous EEG in place. Therapies following. Patient scoring green on the stoplight aggression tool.  Discharge plan pending workup.   "

## 2021-01-15 NOTE — PROGRESS NOTES
New admit from ED. Arrived around 1900. Here with a new acute stroke (L basal gangila). A&OX4. Pleasant. Neuros intact except WFD and mild aphasia. NIH 2 on admission. VSS. Tele NSR. Diet strict NPO. IVF started. No pills due to the 'strict NPO' status. Up with A-1. Impulsive at times. Needs a bed alarm at all times.  Denies pain. Scores green on the Aggression Stop Light Tool. Plan is to complete the neuro workups. WBC 12.6. Crt 1.26. K 2.9. K being replaced intervenously.

## 2021-01-15 NOTE — PLAN OF CARE
"Jose Vazquez is a 75 yr old male who presented to the emergency department for evaluation of speech difficulty. It is medically necessary for this patient to undergo video and audio monitoring via video EEG to evaluate for seizure like activity.     Carol Anderson PA-C  Neurology   1/15/2021 at 2:10 PM  To page me or covering stroke neurology team member, click here: AMCOM  Choose \"On Call\" tab at top, then search dropdown box for \"Neurology Adult\" & press Enter, look for Neuro ICU/Stroke    "

## 2021-01-15 NOTE — PROGRESS NOTES
LTV  Day 1 started @ 0117. Ordered by Carol Anderson.  Explained procedure to patient prior to hook up.   Video Observation initiated, patient informed.   Neurology stated video and audio were medically necessary for testing  Myra Thomas

## 2021-01-15 NOTE — PROGRESS NOTES
RECEIVING UNIT ED HANDOFF REVIEW    ED Nurse Handoff Report was reviewed by: Iain Arevalo RN on January 14, 2021 at 6:42 PM

## 2021-01-15 NOTE — PROGRESS NOTES
[facility]    Medicine Progress Note - Hospitalist Service       Date of Admission:  1/14/2021  Assessment & Plan       Mr. Jose Vazquez is a 75-year-old male patient with history including hypertension, COPD, BPH, who does not follow with a doctor regularly and is not on medications, who presents with abrupt onset of speech impairment and gait disturbance and found to have suffered an acute stroke.        Subacutre left basal ganglia stroke with possible petechial hemorrhage within the above infarct.  Chronic-appearing infarct in the inferior medial right cerebellum.  Multiple probable chronic microhemorrhages within both cerebral hemispheres and cerebellum.  Right carotid stenosis.   * The patient presented with speech/language impairment and gait disturbance. Code stroke was called.  Head CT without contrast showed new hyperdensity in the inferior medial right cerebellum concerning for an age indeterminate infarct and acute ischemia not excluded.  CTA head and neck did not show any acute arterial thromboembolism, but there was noted to be moderate stenosis of the distal left P2 branch as well as marked atherosclerotic disease in the left carotid artery resulting in approximately 70% stenosis by NASCET criteria.  CT head perfusion was noted to be unremarkable.    * After discussion with Stroke Neurology the patient declined peripheral tPA.  He had a subsequent MRI that showed signs of an left basal ganglia subacute infarct; signs of possible petechial hemorrhage within the infarct; more chronic-appearing infarct in the inferior medial right cerebellum; there were also multiple probable chronic microhemorrhages within both cerebral hemispheres and the cerebellum.  - A1c 5.6%, LDL 93  - Telemetry.   - Continue aspirin which was started in the ER.   - Start atorvastatin 40 mg daily.  - TTE - EF normal, normal sized atria, unclear if bubble pursued?  - PT/OT/ST  - diet advanced, rec ARU at this  point  - Blood pressure management as below.   - Neurology stroke following  - Await Neurology recommendations regarding further Vascular evaluation of left carotid stenosis.  - not routinely following commands, but speech seems to be better than it had been PTA     Benign essential hypertension with elevated blood pressures, suspect due to above.    The patient has history of hypertension, but not on any medications.  He does not appear to follow with a doctor regularly.  It appears that he used to be on blood pressure pills including lisinopril-HCTZ.  - PRN IV hydralazine and PRN IV labetalol per stroke protocol.    - Based on blood pressures and when able, can consider initiation of oral medications.     Acute kidney injury, suspect prerenal - improving  The patient has creatinine 1.41 on adm.   - We will order normal saline 100 mL an hour for 15 hours.   - 1/15 creat 1.10 (improving)  - Avoid nephrotoxic medications.      Hypokalemia, improved  2.8 on adm, improved to 3.2 on AM of 1/15.   - The patient was given potassium replacement.    - follow BMP, creat improving so replacement protocol can be utilized     Chronic obstructive pulmonary disease history.    He smoked for over 40 years, a pack a day, but quit a few years ago.  Not requiring any inhalers.  - Order PRN albuterol.      Asymptomatic COVID evaluation.   - The patient will be asymptomatically screened for COVID-19.       Diet: Combination Diet Dysphagia Diet Level 3: Advanced; Thin Liquids (water, ice chips, juice, milk gelatin, ice cream, etc)    DVT Prophylaxis: Pneumatic Compression Devices  Shen Catheter: not present  Code Status: Full Code           Disposition Plan   Expected discharge: ARU recommended by therapies, 2-3 days pending neuro work up, EEG  Entered: John Prasad MD 01/15/2021, 9:11 AM       The patient's care was discussed with the Bedside Nurse and Patient.    John Prasad MD  Hospitalist Service  [facility]  [facility]  Contact information available via [facility] Paging/Directory    ______________________________________________________________________    Interval History   Seen and examined early afternoon. No new complaints. Some difficulty finding words / getting them out it seems. Oriented to self and hospital. Moving all ext but difficult to repeatedly follow commands. EEG ongoing. Neuro stroke recs pending.    Data reviewed today: I reviewed all medications, new labs and imaging results over the last 24 hours. I personally reviewed no images or EKG's today.    Physical Exam   Vital Signs: Temp: 98  F (36.7  C) Temp src: Oral BP: (!) 138/93 Pulse: 78   Resp: 16 SpO2: 94 % O2 Device: None (Room air)    Weight: 172 lbs 11.2 oz    Gen: NAD, pleasant  HEENT: Normocephalic, EOMI but DTA, MMM  Resp: no crackles,  no wheezes, no increased work of resp  CV: S1S2 heard, reg rhythm, reg rate, no pitting pedal edema  Abdo: soft, nontender, nondistended, bowel sounds present  Ext: calves nontender, well perfused  Neuro: AAOxself and hospital, CN grossly intact, no facial asymmetry, moving all 4, difficult to assess as not persistently following commands      Data   Recent Labs   Lab 01/15/21  0623 01/14/21  1927 01/14/21  1453   WBC 10.3 12.6* 12.5*   HGB 14.5 15.2 16.3   MCV 87 87 88    249 298   INR  --   --  1.12    139 141   POTASSIUM 3.2* 2.9* 2.8*   CHLORIDE 107 103 104   CO2 28 28 31   BUN 21 22 22   CR 1.10 1.26* 1.41*   ANIONGAP 5 8 6   MARCO ANTONIO 8.8 8.9 9.6   * 118* 119*   ALBUMIN  --  3.4  --    PROTTOTAL  --  7.7  --    BILITOTAL  --  1.0  --    ALKPHOS  --  99  --    ALT  --  28  --    AST  --  16  --      Recent Results (from the past 24 hour(s))   CT Head w/o Contrast    Narrative    CT SCAN OF THE HEAD WITHOUT CONTRAST   1/14/2021 3:07 PM     HISTORY: Code stroke    TECHNIQUE:  Axial images of the head and coronal reformations without  IV contrast material. Radiation dose for this scan was  reduced using  automated exposure control, adjustment of the mA and/or kV according  to patient size, or iterative reconstruction technique.    COMPARISON: Brain MR 3/17/2017.    FINDINGS: No evidence of acute intracranial hemorrhage. No mass effect  or midline shift. Extensive periventricular white matter hypodensities  which may be due to chronic microvascular ischemic disease. New  wedge-shaped hypodensity in the inferior medial right cerebellum that  was not present on 3/17/2017. Mild diffuse parenchymal volume loss. No  abnormal extra-axial fluid collection. Ventricular size is within  normal limits without evidence of hydrocephalus.    The visualized portions of the sinuses and mastoids appear normal. The  bony calvarium and bones of the skull base appear intact.       Impression    IMPRESSION:     1. No evidence of acute intracranial hemorrhage, mass, or herniation.  2. Extensive periventricular white matter hypodensities which are  nonspecific, but possibly related to chronic microvascular ischemic  disease. Superimposed acute infarct would be difficult to exclude.  3. New hyperdensity in the inferior medial right cerebellum. This is  concerning for an age-indeterminate infarct and acute ischemia is not  excluded. CT angiogram and CT perfusion to follow.      CARLOZ CHANDLER MD   CTA Head Neck with Contrast    Narrative    CT ANGIOGRAM OF THE HEAD AND NECK WITH CONTRAST  CT HEAD PERFUSION WITH CONTRAST  1/14/2021 3:08 PM     HISTORY: CODE STROKE    TECHNIQUE: CT angiography with an injection of 70mL Isovue-370  (accession [id number]), 50mL Isovue-370 (accession [id number]) IV with  scans through the head and neck. Images were transferred to a separate  3-D workstation where multiplanar reformations and 3-D images were  created. Estimates of carotid stenoses are made relative to the distal  internal carotid artery diameters except as noted. Radiation dose for  this scan was reduced using automated exposure  control, adjustment of  the mA and/or kV according to patient size, or iterative  reconstruction technique.     Perfusion scans were performed with injection of additional IV  contrast. These images were processed on a separate 3-D workstation.     COMPARISON: None.     CT HEAD FINDINGS: No contrast enhancing lesions. CT perfusion images  of the head are unremarkable.     CT ANGIOGRAM HEAD FINDINGS:  The major intracranial arteries including  the proximal branches of the anterior cerebral, middle cerebral, and  posterior cerebral arteries appear patent without vascular cutoff.  Bulbous prominence of the basilar artery. No definite aneurysm  identified.    Moderate stenosis of a distal left P2 branch (series 9 image 440).    CT ANGIOGRAM NECK FINDINGS:   Normal origin of the great vessels from the aortic arch.     Right carotid artery: The right common and internal carotid arteries  are patent. Marked soft and calcified plaque at the carotid  bifurcation and proximal internal carotid artery resulting in  approximately 70% stenosis by NASCET criteria.     Left carotid artery: The left common and internal carotid arteries are  patent. Mild atherosclerotic disease at the carotid bifurcation and  proximal internal carotid artery without significant stenosis by  NASCET criteria.     Vertebral arteries: Vertebral arteries are patent without evidence of  dissection. No significant stenosis.     Other findings: Emphysematous changes in the visualized lung apices.      Impression    IMPRESSION:   1. No definite vascular cutoff of the proximal major intracranial  arteries.  2. Moderate stenosis of a distal left P2 branch. No other significant  intracranial stenosis appreciated.   3. Marked atherosclerotic disease in the left carotid artery resulting  in approximately 70% stenosis by NASCET criteria.  4. Mild atherosclerotic disease in the left carotid artery without  significant stenosis.  5. Unremarkable CT perfusion images  of the head.  6. Emphysematous changes in the visualized lung apices.    Results discussed with Tj Daniel at 3:24 PM on 1/14/2021     CARLOZ CHANDLER MD   CT Head Perfusion w Contrast    Narrative    CT ANGIOGRAM OF THE HEAD AND NECK WITH CONTRAST  CT HEAD PERFUSION WITH CONTRAST  1/14/2021 3:08 PM     HISTORY: CODE STROKE    TECHNIQUE: CT angiography with an injection of 70mL Isovue-370  (accession [id number]), 50mL Isovue-370 (accession [id number]) IV with  scans through the head and neck. Images were transferred to a separate  3-D workstation where multiplanar reformations and 3-D images were  created. Estimates of carotid stenoses are made relative to the distal  internal carotid artery diameters except as noted. Radiation dose for  this scan was reduced using automated exposure control, adjustment of  the mA and/or kV according to patient size, or iterative  reconstruction technique.     Perfusion scans were performed with injection of additional IV  contrast. These images were processed on a separate 3-D workstation.     COMPARISON: None.     CT HEAD FINDINGS: No contrast enhancing lesions. CT perfusion images  of the head are unremarkable.     CT ANGIOGRAM HEAD FINDINGS:  The major intracranial arteries including  the proximal branches of the anterior cerebral, middle cerebral, and  posterior cerebral arteries appear patent without vascular cutoff.  Bulbous prominence of the basilar artery. No definite aneurysm  identified.    Moderate stenosis of a distal left P2 branch (series 9 image 440).    CT ANGIOGRAM NECK FINDINGS:   Normal origin of the great vessels from the aortic arch.     Right carotid artery: The right common and internal carotid arteries  are patent. Marked soft and calcified plaque at the carotid  bifurcation and proximal internal carotid artery resulting in  approximately 70% stenosis by NASCET criteria.     Left carotid artery: The left common and internal carotid arteries are  patent. Mild  atherosclerotic disease at the carotid bifurcation and  proximal internal carotid artery without significant stenosis by  NASCET criteria.     Vertebral arteries: Vertebral arteries are patent without evidence of  dissection. No significant stenosis.     Other findings: Emphysematous changes in the visualized lung apices.      Impression    IMPRESSION:   1. No definite vascular cutoff of the proximal major intracranial  arteries.  2. Moderate stenosis of a distal left P2 branch. No other significant  intracranial stenosis appreciated.   3. Marked atherosclerotic disease in the left carotid artery resulting  in approximately 70% stenosis by NASCET criteria.  4. Mild atherosclerotic disease in the left carotid artery without  significant stenosis.  5. Unremarkable CT perfusion images of the head.  6. Emphysematous changes in the visualized lung apices.    Results discussed with Tj Daniel at 3:24 PM on 1/14/2021     CARLOZ CHANDLER MD   MR Brain w/o & w Contrast    Narrative    MRI BRAIN WITHOUT AND WITH CONTRAST  1/14/2021 5:18 PM     HISTORY:  Focal neurologic deficit, less than 6 hours, stroke  suspected. Hyperacute MRI.    TECHNIQUE:  Multiplanar, multisequence MRI of the brain without and  with 8 mL Gadavist.    COMPARISON: Head CT from earlier the same day. Brain MR 3/17/2017.     FINDINGS: Area of restricted diffusion in the left basal ganglia  involving the putamen and globus pallidus concerning for infarct.  There is associated enhancement in this region suggesting that it is  more subacute in age. New foci of susceptibility hypointensity in that  area that could represent petechial hemorrhage associated with the  infarct. No large acute parenchymal hemorrhage. No midline shift or  herniation. No other definite areas of infarct. Increased signal on  the DWI sequence in the left cerebellum around an area of previous  hemorrhage is likely artifact.    Well-defined area of encephalomalacia in the inferior medial  right  cerebellum has the appearance of a more chronic infarct. Extensive  periventricular white matter T2 hyperdensities are nonspecific, but  likely related to chronic microvascular ischemic disease. Moderate  diffuse parenchymal volume loss. Ventricular size is within normal  limits without evidence of hydrocephalus.    Multiple foci of susceptibility hypointensity in both cerebral  hemispheres as well as the cerebellum which most likely represent  chronic microhemorrhages.      Impression    IMPRESSION:    1. Area of restricted diffusion in the left basal ganglia with  associated enhancement in that area. This likely represents a region  of subacute infarct.   2. New foci of susceptibility hypointensity in the left basal ganglia  along the superior margin of the region of infarct. This could  represent petechial hemorrhage within the infarct. No large  parenchymal hemorrhage is appreciated. No significant midline shift or  herniation.  3. Moderate diffuse parenchymal volume loss and extensive white matter  changes likely due to chronic microvascular ischemic disease.  4. More chronic-appearing infarct in the inferior medial right  cerebellum.  5. Multiple probable chronic microhemorrhages within both cerebral  hemispheres and the cerebellum.     Results discussed with Tj Daniel at 5:28 PM on 1/14/2021.     CARLOZ CHANDLER MD

## 2021-01-15 NOTE — PROGRESS NOTES
"   01/15/21 0930   Quick Adds   Type of Visit Initial Occupational Therapy Evaluation   Living Environment   People in home alone   Current Living Arrangements house   Home Accessibility no concerns   Transportation Anticipated car, drives self   Living Environment Comments Pt reports his home has no stairs to get in or inside   Self-Care   Usual Activity Tolerance good   Current Activity Tolerance fair   Regular Exercise No   Activity/Exercise/Self-Care Comment Pt reports at baseline he does all IADLs independently, including driving and snow removal. When asked if he has any family or friends in the area, he stated no but later said he has \"a friend who lives on the corner.\"   Disability/Function   Wear Glasses or Blind yes   Vision Management Glasses   Dressing/Bathing Difficulty no   Toileting issues no   Doing Errands Independently Difficulty (such as shopping) yes   Fall history within last six months no   General Information   Onset of Illness/Injury or Date of Surgery 01/14/21   Referring Physician Chapito Corcoran MD   Additional Occupational Profile Info/Pertinent History of Current Problem From physician note \"Mr. Jose Vazquez is a 75-year-old male patient with history noted below, including hypertension, COPD and BPH, who does not see a doctor regularly and is presently not on any blood pressure medication, who presents with the above issues.  Please note that the history is obtained from speaking with ER provider, Dr. Daniel, and from reviewing the electronic medical record as the patient does have speech/language impairment at this time.\"   Performance Patterns (Routines, Roles, Habits) Pt is retired, when asked what his former profession was, he was unable to state. when asked what he does with his time now he states \"I don't know\"   Existing Precautions/Restrictions fall   Limitations/Impairments safety/cognitive   General Observations and Info Pt appears to have word-finding difficulty, and showed " "examples of word substitution   Cognitive Status Examination   Orientation Status person  (Said place as \"[facility]\" )   Affect/Mental Status (Cognitive) flat/blunted affect   Follows Commands follows one-step commands;75-90% accuracy   Safety Deficit awareness of need for assistance;moderate deficit   Visual Perception   Visual Impairment/Limitations corrective lenses for reading   Range of Motion Comprehensive   Comment, General Range of Motion L UE ROM WFL, R UE WFL however pt appeared to have more difficulty with motor planning for R UE   Strength Comprehensive (MMT)   Comment, General Manual Muscle Testing (MMT) Assessment Bilateral UE WFL    Coordination   Coordination Comments Pt performed finger-to-nose test at slow speed, required vc and showed slight impairment without vc   Bed Mobility   Bed Mobility supine-sit   Supine-Sit [place] (Bed Mobility) independent   Transfers   Transfers sit-stand transfer   Transfer Comments CGA    Sit-Stand Transfer   Sit-Stand [place] (Transfers) contact guard   Balance   Balance Comments Decreased balance noted with functional mobility   Activities of Daily Living   BADL Assessment/Intervention grooming   Grooming Assessment/Training   [place] Level (Grooming) set up   Position (Grooming) supported standing   Comment (Grooming) Pt able to locate and use objects appropriately, but did seem to have difficulty terminating activity (brushed teeth for 3 minutes)   Instrumental Activities of Daily Living (IADL)   Previous Responsibilities meal prep;housekeeping;laundry;shopping;yardwork;medication management;finances;driving   IADL Comments Pt reports I at baseline with all IADLs   Clinical Impression   Criteria for Skilled Therapeutic Interventions Met (OT) yes   OT Diagnosis Impaired cognitive status, higher executive cognitive functioning impacting pt's ability to safely complete functional mobility, ADLs/IADLs.    OT Problem List-Impairments " impacting ADL balance;cognition;communication;mobility   Assessment of Occupational Performance 3-5 Performance Deficits   Identified Performance Deficits Executive functioning, safety awareness, communication, balance   Planned Therapy Interventions (OT) ADL retraining;IADL retraining;cognition;balance training;progressive activity/exercise;strengthening   Clinical Decision Making Complexity (OT) moderate complexity   Therapy Frequency (OT) Daily   Predicted Duration of Therapy 1 week   Risks and Benefits of Treatment have been explained. Yes   Patient, Family & other staff in agreement with plan of care Yes   OT Discharge Planning    OT Discharge Recommendation (DC Rec) [facility] patient will benefit from intensive, interdisciplinary therapy.  Anticipate will be able to tolerate 3 hours of therapy per day   OT Rationale for DC Rec Pt is below baseline for functional mobility, I/ADLs and would benefit from intensive multidisciplinary therapies. Anticipate pt able to tolerate 3 hours of therapy per day.    Total Evaluation Time (Minutes)   Total Evaluation Time (Minutes) 12
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
## 2021-01-15 NOTE — PLAN OF CARE
Disoriented to time, situation, but difficult to assess d/t WFD and forgetfulness. Denies pain. NPO. Up SBA GB to BR. Continent. Neuro deficits include slight L droop, WFD, slurred speech, slow-deliberate finger to nose and heel to shin. VSS on RA, ex HTN and Tele NSR. IVF infusing, K replaced overnight, recheck ordered. Plan for neuro workup yet to be done.

## 2021-01-15 NOTE — PROGRESS NOTES
"   01/15/21 1100   Quick Adds   Type of Visit Initial PT Evaluation   Living Environment   People in home alone   Current Living Arrangements house   Home Accessibility no concerns   Transportation Anticipated car, drives self   Self-Care   Usual Activity Tolerance good   Current Activity Tolerance fair   Regular Exercise No   Equipment Currently Used at Home none  (doesn't know if he owns any equipment)   Activity/Exercise/Self-Care Comment Pt reports at baseline he does all IADLs independently, including driving and snow removal. When asked if he has any family or friends in the area, he stated no but later said he has \"a friend who lives on the corner.\"   Disability/Function   Wear Glasses or Blind yes   Vision Management glasses   Walking or Climbing Stairs Difficulty no   Fall history within last six months no   Change in Functional Status Since Onset of Current Illness/Injury yes   General Information   Onset of Illness/Injury or Date of Surgery 01/14/21   Referring Physician Dr. Corcoran   Patient/Family Therapy Goals Statement (PT) Pt wants to go home   Pertinent History of Current Problem (include personal factors and/or comorbidities that impact the POC)  Mr. Jose Vazquez is a 75-year-old male patient with history noted below, including hypertension, COPD and BPH, who does not see a doctor regularly and is presently not on any blood pressure medication, admitted for acute left basal ganglia CVA. Pt was noticed to be walking abnormally by  that knows pt   Cognition   Orientation Status (Cognition) person;place   Affect/Mental Status (Cognition) flat/blunted affect   Follows Commands (Cognition) delayed response/completion;increased processing time needed;repetition of directions required;verbal cues/prompting required   Cognitive Status Comments pt could not give history of why came to hospital   Pain Assessment   Patient Currently in Pain No   Range of Motion (ROM)   ROM Comment extremities " WFL   Strength   Strength Comments baseline foot drop on right from 1963, generalized weakness. Right LE weaker than left for knee ext, hip flex.   Bed Mobility   Comment (Bed Mobility) supine to sit; cues and SBA. impulsive   Transfers   Transfer Safety Comments sit to stand: CGA for static standing. pt unable to stand without arms unless pushing legs off bed.    Gait/Stairs (Locomotion)   Comment (Gait/Stairs) Pt has ataxic gait requiring Daryn to ambulate in [place] with increased deviation and LOB requring assist   Balance   Balance Comments CGA static, Daryn dynamic, Daryn EC   Sensory Examination   Sensory Perception patient reports no sensory changes   Coordination   Coordination Comments impaired and slow finger to nose and heel to shin   Clinical Impression   Criteria for Skilled Therapeutic Intervention yes, treatment indicated   PT Diagnosis (PT) ataxia   Influenced by the following impairments decreased strength, balance, coordination, motor planning. Ataxic gait and impaired cognition   Functional limitations due to impairments fall risk, below baseline for mobility   Clinical Presentation Stable/Uncomplicated   Clinical Presentation Rationale clinical judgment   Clinical Decision Making (Complexity) moderate complexity   Therapy Frequency (PT) 2x/day   Predicted Duration of Therapy Intervention (days/wks) 2-3 days   Planned Therapy Interventions (PT) balance training;gait training;home exercise program;neuromuscular re-education;motor coordination training;patient/family education;strengthening;transfer training   Anticipated Equipment Needs at Discharge (PT) walker, rolling   Risk & Benefits of therapy have been explained evaluation/treatment results reviewed;care plan/treatment goals reviewed;participants voiced agreement with care plan;patient   PT Discharge Planning    PT Discharge Recommendation (DC Rec) [facility]-Motivated patient will benefit from intensive, interdisciplinary therapy.   Anticipate will be able to tolerate 3 hours of therapy per day   PT Rationale for DC Rec Pt has ataxic gait that requires assist of one for gait. Pt has decreased strength on right, decreased coordination, impulsivity, decreased motor planning and lacks insight into deficits as well as cognitive deficits. Pt cannot be alone at home due to physical and cognitive deficits and pt does nto have anyone who can assist. Pt has need for multiple therapy disciplines and would benefit from ARU.   PT Brief overview of current status  Daryn for gait, cues and Daryn for transfers, balance,    Total Evaluation Time   Total Evaluation Time (Minutes) 14

## 2021-01-16 ENCOUNTER — APPOINTMENT (OUTPATIENT)
Dept: PHYSICAL THERAPY | Facility: CLINIC | Age: 76
DRG: 064 | End: 2021-01-16
Attending: PHYSICIAN ASSISTANT
Payer: COMMERCIAL

## 2021-01-16 ENCOUNTER — APPOINTMENT (OUTPATIENT)
Dept: SPEECH THERAPY | Facility: CLINIC | Age: 76
DRG: 064 | End: 2021-01-16
Attending: PHYSICIAN ASSISTANT
Payer: COMMERCIAL

## 2021-01-16 LAB
GLUCOSE BLDC GLUCOMTR-MCNC: 107 MG/DL (ref 70–99)
POTASSIUM SERPL-SCNC: 4.1 MMOL/L (ref 3.4–5.3)

## 2021-01-16 PROCEDURE — 120N000001 HC R&B MED SURG/OB

## 2021-01-16 PROCEDURE — 92526 ORAL FUNCTION THERAPY: CPT | Mod: GN

## 2021-01-16 PROCEDURE — 250N000013 HC RX MED GY IP 250 OP 250 PS 637: Performed by: INTERNAL MEDICINE

## 2021-01-16 PROCEDURE — 97112 NEUROMUSCULAR REEDUCATION: CPT | Mod: GP | Performed by: PHYSICAL THERAPIST

## 2021-01-16 PROCEDURE — 999N001017 HC STATISTIC GLUCOSE BY METER IP

## 2021-01-16 PROCEDURE — 84132 ASSAY OF SERUM POTASSIUM: CPT | Performed by: HOSPITALIST

## 2021-01-16 PROCEDURE — 250N000013 HC RX MED GY IP 250 OP 250 PS 637: Performed by: HOSPITALIST

## 2021-01-16 PROCEDURE — 92523 SPEECH SOUND LANG COMPREHEN: CPT | Mod: GN

## 2021-01-16 PROCEDURE — 250N000013 HC RX MED GY IP 250 OP 250 PS 637: Performed by: PHYSICIAN ASSISTANT

## 2021-01-16 PROCEDURE — 99232 SBSQ HOSP IP/OBS MODERATE 35: CPT | Performed by: HOSPITALIST

## 2021-01-16 PROCEDURE — 97116 GAIT TRAINING THERAPY: CPT | Mod: GP | Performed by: PHYSICAL THERAPIST

## 2021-01-16 PROCEDURE — 99233 SBSQ HOSP IP/OBS HIGH 50: CPT | Performed by: PSYCHIATRY & NEUROLOGY

## 2021-01-16 PROCEDURE — 36415 COLL VENOUS BLD VENIPUNCTURE: CPT | Performed by: HOSPITALIST

## 2021-01-16 RX ORDER — POTASSIUM CHLORIDE 1.5 G/1.58G
40 POWDER, FOR SOLUTION ORAL ONCE
Status: COMPLETED | OUTPATIENT
Start: 2021-01-16 | End: 2021-01-16

## 2021-01-16 RX ORDER — AMLODIPINE BESYLATE 5 MG/1
5 TABLET ORAL DAILY
Status: DISCONTINUED | OUTPATIENT
Start: 2021-01-16 | End: 2021-01-19

## 2021-01-16 RX ADMIN — POTASSIUM CHLORIDE 40 MEQ: 1.5 POWDER, FOR SOLUTION ORAL at 09:44

## 2021-01-16 RX ADMIN — ATORVASTATIN CALCIUM 40 MG: 40 TABLET, FILM COATED ORAL at 19:38

## 2021-01-16 RX ADMIN — AMLODIPINE BESYLATE 5 MG: 5 TABLET ORAL at 10:55

## 2021-01-16 RX ADMIN — ALBUTEROL SULFATE 2 PUFF: 90 AEROSOL, METERED RESPIRATORY (INHALATION) at 19:39

## 2021-01-16 RX ADMIN — ASPIRIN 325 MG ORAL TABLET 325 MG: 325 PILL ORAL at 09:44

## 2021-01-16 ASSESSMENT — ACTIVITIES OF DAILY LIVING (ADL)
ADLS_ACUITY_SCORE: 12

## 2021-01-16 NOTE — PLAN OF CARE
Pt here with acute stroke. Oriented to self. Neuros with wfd, slow to respond, slight L droop, refused part of neuro assessment, states no when asked to do complex neuros. VSS. Tele NSR. Dd3 diet diet with thin liquids. Up with SBA/gb. Inc of bladderx1 and cont of bladder x1 over night. Denies pain. 24 hour eeg until 13:00 today. Pt scoring green on the Aggression Stop Light Tool. Plan for ARU in 2-3 days after neuro workup complete.  Pt belongings from admission include glasses at bedside.

## 2021-01-16 NOTE — PROGRESS NOTES
"[facility]    Stroke Progress Note    Interval Events  No acute events reported overnight. Discussed prelim vEEG report which does not show any seizure like activity.    Stroke Evaluation summarized:  MRI/Head CT: MRI revealed left basal ganglia infarct, severe baseline microvascular disease and cortical microhemorrhages   Intracranial Vascular Imaging: Moderate stenosis of a distal left P2 branch,   Cervical Carotid and Vertebral Artery Vascular Imaging: Marked soft and calcified plaque at the carotid bifurcation and proximal right internal carotid artery resulting in approximately 70% stenosis   Echocardiogram: EF 60-65%, normal left and right atrial size, no report on atrial shunt   EKG/Telemetry: Sinus tachy with occasional PVC's   LDL: 93  A1c: 5.6  Troponin: not ordered   Other testing: vEEG unrevealing      Impression  75 yr M who presented to the emergency department for evaluation of language difficulties and AMS. MRI brain revealed a left BG infarct.      Recommendations  - Continue  mg daily   - ok to discontinue vEEG  - LDL (93) ; continue Atorvastatin 40 mg daily   - A1c (5.6) within goal < 7.0  - Goal BP <130/80 with tighter control associated with decreased overall CV risk, if tolerated  - Discharge with 30 day cardiac event monitor to evaluate for atrial fibrillation  - Therapies as needed   - PLC   - Follow up with PCP in 1-2 weeks   - [facility] [phone]) with stroke Specialist RAMANA: Kathy Bourne PA-C, Vee Agarwal, SUZANNA, Carol Anderson PA-C in 6-10 weeks. Referral placed in discharge orders section (note: the order states \"neurosurgery\")    Thank you for this consult. No further stroke evaluation is recommended, so we will sign off. Please contact us with any additional questions.    Carol Anderson PA-C   Neurology  To page me or covering stroke neurology team member, click here: [facility]   Choose \"On Call\" " "tab at top, then search dropdown box for \"Neurology Adult\", select location, press Enter, then look for stroke/neuro ICU/telestroke.  ______________________________________________________    Medications   Home Meds  Prior to Admission medications    Not on File       Scheduled Meds    aspirin  325 mg Oral or NG Tube Daily     atorvastatin  40 mg Oral QPM       Infusion Meds    - MEDICATION INSTRUCTIONS -         PRN Meds  acetaminophen, albuterol, hydrALAZINE, labetalol, - MEDICATION INSTRUCTIONS -, melatonin, ondansetron **OR** ondansetron, polyethylene glycol, prochlorperazine **OR** prochlorperazine **OR** prochlorperazine       PHYSICAL EXAMINATION  Temp:  [97.7  F (36.5  C)-98  F (36.7  C)] 97.9  F (36.6  C)  Pulse:  [79-94] 80  Resp:  [16-20] 16  BP: (143-154)/() 153/99  SpO2:  [94 %-95 %] 94 %     Neurologic  Mental Status: alert to self, able to follow simple commands, intermittently slow/delayed speech in response to questions possibly secondary to questionable WFD    Cranial Nerves:  visual fields intact, EOMI with normal smooth pursuit, facial movements symmetric, hearing not formally tested but intact to conversation  Motor:  normal muscle tone and bulk, no abnormal movements, able to move all limbs spontaneously, no pronator drift  Reflexes:  deferred  Sensory:  light touch sensation intact and symmetric throughout upper and lower extremities  Coordination:  no obvious ataxia   Station/Gait:  deferred    Imaging  I personally reviewed all imaging; relevant findings per HPI.     Lab Results Data   CBC  Recent Labs   Lab 01/15/21  0623 01/14/21 1927 01/14/21  1453   WBC 10.3 12.6* 12.5*   RBC 4.99 5.25 5.64   HGB 14.5 15.2 16.3   HCT 43.6 45.4 49.7    249 298     Basic Metabolic Panel    Recent Labs   Lab 01/15/21  2330 01/15/21  1625 01/15/21  0623 01/14/21 1927 01/14/21  1453   NA  --   --  140 139 141   POTASSIUM 3.4 3.4 3.2* 2.9* 2.8*   CHLORIDE  --   --  107 103 104   CO2  --   --  28 " 28 31   BUN  --   --  21 22 22   CR  --   --  1.10 1.26* 1.41*   GLC  --   --  107* 118* 119*   MARCO ANTONIO  --   --  8.8 8.9 9.6     Liver Panel  Recent Labs   Lab 01/14/21 1927   PROTTOTAL 7.7   ALBUMIN 3.4   BILITOTAL 1.0   ALKPHOS 99   AST 16   ALT 28     INR    Recent Labs   Lab Test 01/14/21  1453   INR 1.12      Lipid Profile    Recent Labs   Lab Test 01/14/21 1927   CHOL 151   HDL 39*   LDL 93   TRIG 93     A1C    Recent Labs   Lab Test 01/14/21 1927   A1C 5.6     Troponin I  No results for input(s): TROPI in the last 168 hours.

## 2021-01-16 NOTE — PROGRESS NOTES
"Speech/Language Assessment:      01/16/21 0930   General Information   Onset of Illness/Injury or Date of Surgery 01/14/21   Referring Physician Chapito Corcoran MD   Patient/Family Therapy Goal Statement (SLP) To improve communication   Pertinent History of Current Problem \"Mr. Jose Vazquez is a 75-year-old male patient with history including hypertension, COPD, BPH, who does not follow with a doctor regularly and is not on medications, who presents with abrupt onset of speech impairment and gait disturbance and found to have suffered an acute stroke.\" Diagnostic testing revealing Subacutre left basal ganglia stroke with possible petechial hemorrhage within the above infarct; Chronic-appearing infarct in the inferior medial right cerebellum; Multiple probable chronic microhemorrhages within both cerebral hemispheres and cerebellum; Significant left carotid stenosis.   General Observations Pt cooperative, decreased insight into reason for admission/deficits.   Type of Evaluation   Type of Evaluation Speech, Language, Cognition   Western Aphasia Battery- Revised Bedside Record From   Spontaneous Speech Content Score (out of 10) 3   Spontaneous Speech Fluency Score (out of 10) 5   Auditory Verbal Comprehension Score (out of 10) 10   Sequential Commands Score (out of 10) 3   Repetition Score (out of 10) 9   Object Naming Score (out of 10) 5   Bedside Aphasia Sum 35   WAB-R Bedside Aphasia Score 58.33   Bedside Aphasia Classification Anomic Aphasia   Aphasia Severity Level Moderate Aphasia   General Therapy Interventions   Planned Therapy Interventions Language   Language Auditory comprehension;Verbal expression   SLP Therapy Assessment/Plan   Criteria for Skilled Therapeutic Interventions Met (SLP Eval) yes;treatment indicated   SLP Diagnosis Moderate Aphasia   Rehab Potential (SLP Eval) good, to achieve stated therapy goals   Therapy Frequency (SLP Eval) daily   Predicted Duration of Therapy Intervention (SLP Eval) " 1 week   Comment, Therapy Assessment/Plan (SLP) Speech/Language assessment completed using the [place] Aphasia Battery- Bedside Edition. 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 Notable deficits include impaired awareness to medical situation/communication deficits, decreased initiation, delayed processing/response time, moderate-severe word finding and speech fluency deficits, mild-moderate auditory comprehension deficits (intact for 1-unit level, impairments at >/=2 unit level). Phonemic cues and sentence completion cues improving accuracy on word finding tasks by ~30%.    SLP Discharge Planning    SLP Discharge Recommendation (DC Rec) Acute Rehab Center-Motivated patient will benefit from intensive, interdisciplinary therapy.  Anticipate will be able to tolerate 3 hours of therapy per day   SLP Rationale for DC Rec Patient below baseline for swallowing and communication and would benefit from intensive intervention to rehabiliate function.    SLP Brief overview of current status  Recommend ugprade to regular solids, continue thin liquids when fully upright, assist with tray set up/periodic supervision, slow pacing, periodic liquid wash. Initiate daily communication intervention targetting verbal expression, auditory comprehension of >2 units    Total Evaluation Time   Total Evaluation Time (Minutes) 21

## 2021-01-16 NOTE — PLAN OF CARE
"BP (!) 150/107 (BP Location: Right arm)   Pulse 82   Temp 98.3  F (36.8  C) (Oral)   Resp 16   Ht 1.854 m (6' 1\")   Wt 78.3 kg (172 lb 11.2 oz)   SpO2 93%   BMI 22.79 kg/m      Nursing shift note  -VSS  -Uncooperative to a couple things such as the smile as a part of neuro check  -Otherwise pleasant and cooperative  -Repalced potassium with packet for 3.4 K+  -Worked with speech who moved him to a tentative regular diet.  -Some expiratory wheezes    Neuros:  -Slight right droop in face  -Equal strength 5/5 in upper extremities 4/5 in lower  -Not performing planter and dorsi on right foot    Behavior & Aggression Tool color:  Green           Pt's belongings:     (Belongings from admission day present in pt's room)    Home medications:   None      "

## 2021-01-16 NOTE — PROGRESS NOTES
[facility]    Medicine Progress Note - Hospitalist Service       Date of Admission:  1/14/2021  Assessment & Plan             Mr. Jose Vazquez is a 75-year-old male patient with history including hypertension, COPD, BPH, who does not follow with a doctor regularly and is not on medications, who presents with abrupt onset of speech impairment and gait disturbance and found to have suffered an acute stroke.        Subacutre left basal ganglia stroke with possible petechial hemorrhage within the above infarct.  Chronic-appearing infarct in the inferior medial right cerebellum.  Multiple probable chronic microhemorrhages within both cerebral hemispheres and cerebellum.  Right carotid stenosis.   * The patient presented with speech/language impairment and gait disturbance. Code stroke was called.  Head CT without contrast showed new hyperdensity in the inferior medial right cerebellum concerning for an age indeterminate infarct and acute ischemia not excluded.  CTA head and neck did not show any acute arterial thromboembolism, but there was noted to be moderate stenosis of the distal left P2 branch as well as marked atherosclerotic disease in the left carotid artery resulting in approximately 70% stenosis by NASCET criteria.  CT head perfusion was noted to be unremarkable.    * After discussion with Stroke Neurology the patient declined peripheral tPA.  He had a subsequent MRI that showed signs of an left basal ganglia subacute infarct; signs of possible petechial hemorrhage within the infarct; more chronic-appearing infarct in the inferior medial right cerebellum; there were also multiple probable chronic microhemorrhages within both cerebral hemispheres and the cerebellum.  - A1c 5.6%, LDL 93  - Telemetry.   - Continue aspirin which was started in the ER.   - Start atorvastatin 40 mg daily.  - TTE - EF normal, normal sized atria, unclear if bubble pursued?  - PT/OT/ST  - diet advanced, rec ARU  at this point (SW consulted and appreciated)  - Blood pressure management as below.   - Neurology stroke signing off 1/16  - Await Neurology recommendations regarding further Vascular evaluation of left carotid stenosis.  - 1/16 improved, no new deficits, more conversant today, still seems forgetful vs unable to verbalize at times.  Neuro stroke signing off. EEG per neuro note is unremarkable.     Benign essential hypertension with elevated blood pressures, suspect due to above.    The patient has history of hypertension, but not on any medications.  He does not appear to follow with a doctor regularly.  It appears that he used to be on blood pressure pills including lisinopril-HCTZ.  - PRN IV hydralazine and PRN IV labetalol per stroke protocol.    - 1/16 started amlodipine 5 daily, will take 2-3 days to see full effect     Acute kidney injury, suspect prerenal - improving  The patient has creatinine 1.41 on adm.   - We will order normal saline 100 mL an hour for 15 hours.   - 1/15 creat 1.10 (improving)  - Avoid nephrotoxic medications.      Hypokalemia, improved  2.8 on adm, improved to 3.2 on AM of 1/15.   - The patient was given potassium replacement.       Chronic obstructive pulmonary disease history.    He smoked for over 40 years, a pack a day, but quit a few years ago.  Not requiring any inhalers.  - Order PRN albuterol.      Asymptomatic COVID evaluation.   - The patient will be asymptomatically screened for COVID-19.         Diet: Combination Diet Regular Diet Adult    DVT Prophylaxis: Pneumatic Compression Devices  Shen Catheter: not present  Code Status: Full Code           Disposition Plan   Expected discharge: possibly ARU pending progress and work with therapy  Entered: John Prasad MD 01/16/2021, 11:58 AM       The patient's care was discussed with the Bedside Nurse and Patient.    John Prasad MD  Hospitalist Service  [facility]  Contact information available  via [email or web address] ([email or web address]) Paging/Directory    ______________________________________________________________________    Interval History   Seen and examined midday. No new complaints. A little more conversant today, oriented to person, place, but forgetful of year. No pain or sob. Moving all ext equally. EEG unremarkable per neuro stroke note.    Data reviewed today: I reviewed all medications, new labs and imaging results over the last 24 hours. I personally reviewed no images or EKG's today.    Physical Exam   Vital Signs: Temp: 98.3  F (36.8  C) Temp src: Oral BP: (!) 146/98 Pulse: 82   Resp: 16 SpO2: 93 % O2 Device: None (Room air)    Weight: 172 lbs 11.2 oz    Gen: NAD, pleasant  HEENT: Normocephalic, EOMI, MMM  Resp: no crackles,  no wheezes, no increased work of resp  CV: S1S2 heard, reg rhythm, reg rate, no pedal edema  Abdo: soft, nontender, nondistended, bowel sounds present  Ext: calves nontender, well perfused  Neuro: AAOx3, CN grossly intact, no facial asymmetry  At times seems to have difficulty verbalizing vs forgetfulness    Data   Recent Labs   Lab 01/15/21  2330 01/15/21  1625 01/15/21  0623 01/14/21  1927 01/14/21  1453   WBC  --   --  10.3 12.6* 12.5*   HGB  --   --  14.5 15.2 16.3   MCV  --   --  87 87 88   PLT  --   --  235 249 298   INR  --   --   --   --  1.12   NA  --   --  140 139 141   POTASSIUM 3.4 3.4 3.2* 2.9* 2.8*   CHLORIDE  --   --  107 103 104   CO2  --   --  28 28 31   BUN  --   --  21 22 22   CR  --   --  1.10 1.26* 1.41*   ANIONGAP  --   --  5 8 6   MARCO ANTONIO  --   --  8.8 8.9 9.6   GLC  --   --  107* 118* 119*   ALBUMIN  --   --   --  3.4  --    PROTTOTAL  --   --   --  7.7  --    BILITOTAL  --   --   --  1.0  --    ALKPHOS  --   --   --  99  --    ALT  --   --   --  28  --    AST  --   --   --  16  --      No results found for this or any previous visit (from the past 24 hour(s)).

## 2021-01-16 NOTE — PLAN OF CARE
"Pt here with ischemic stroke. Alert, disoriented to time and situation. Speech is slow and hesitant. At times states \"no\" when asking to do neuro assessment. Tolerating Dd3 diet and taking pills whole. K replaced, recheck at 2230. Denies pain. PT having continuous EEG. Discharge pending workup.     PT a greenlight on stoplight aggression tool.   PT belongings at bedside.   "

## 2021-01-17 ENCOUNTER — APPOINTMENT (OUTPATIENT)
Dept: PHYSICAL THERAPY | Facility: CLINIC | Age: 76
DRG: 064 | End: 2021-01-17
Attending: PHYSICIAN ASSISTANT
Payer: COMMERCIAL

## 2021-01-17 ENCOUNTER — APPOINTMENT (OUTPATIENT)
Dept: SPEECH THERAPY | Facility: CLINIC | Age: 76
DRG: 064 | End: 2021-01-17
Attending: PHYSICIAN ASSISTANT
Payer: COMMERCIAL

## 2021-01-17 ENCOUNTER — APPOINTMENT (OUTPATIENT)
Dept: OCCUPATIONAL THERAPY | Facility: CLINIC | Age: 76
DRG: 064 | End: 2021-01-17
Attending: PHYSICIAN ASSISTANT
Payer: COMMERCIAL

## 2021-01-17 LAB
ANION GAP SERPL CALCULATED.3IONS-SCNC: 4 MMOL/L (ref 3–14)
BUN SERPL-MCNC: 16 MG/DL (ref 7–30)
CALCIUM SERPL-MCNC: 8.9 MG/DL (ref 8.5–10.1)
CHLORIDE SERPL-SCNC: 109 MMOL/L (ref 94–109)
CO2 SERPL-SCNC: 26 MMOL/L (ref 20–32)
CREAT SERPL-MCNC: 1.01 MG/DL (ref 0.66–1.25)
GFR SERPL CREATININE-BSD FRML MDRD: 73 ML/MIN/{1.73_M2}
GLUCOSE SERPL-MCNC: 101 MG/DL (ref 70–99)
POTASSIUM SERPL-SCNC: 3.8 MMOL/L (ref 3.4–5.3)
SODIUM SERPL-SCNC: 139 MMOL/L (ref 133–144)

## 2021-01-17 PROCEDURE — 80048 BASIC METABOLIC PNL TOTAL CA: CPT | Performed by: HOSPITALIST

## 2021-01-17 PROCEDURE — 92526 ORAL FUNCTION THERAPY: CPT | Mod: GN

## 2021-01-17 PROCEDURE — 97116 GAIT TRAINING THERAPY: CPT | Mod: GP | Performed by: PHYSICAL THERAPIST

## 2021-01-17 PROCEDURE — 97535 SELF CARE MNGMENT TRAINING: CPT | Mod: GO | Performed by: OCCUPATIONAL THERAPIST

## 2021-01-17 PROCEDURE — 36415 COLL VENOUS BLD VENIPUNCTURE: CPT | Performed by: HOSPITALIST

## 2021-01-17 PROCEDURE — 120N000001 HC R&B MED SURG/OB

## 2021-01-17 PROCEDURE — 250N000013 HC RX MED GY IP 250 OP 250 PS 637: Performed by: INTERNAL MEDICINE

## 2021-01-17 PROCEDURE — 250N000013 HC RX MED GY IP 250 OP 250 PS 637: Performed by: PHYSICIAN ASSISTANT

## 2021-01-17 PROCEDURE — 250N000013 HC RX MED GY IP 250 OP 250 PS 637: Performed by: HOSPITALIST

## 2021-01-17 PROCEDURE — 99232 SBSQ HOSP IP/OBS MODERATE 35: CPT | Performed by: HOSPITALIST

## 2021-01-17 PROCEDURE — 97530 THERAPEUTIC ACTIVITIES: CPT | Mod: GO | Performed by: OCCUPATIONAL THERAPIST

## 2021-01-17 RX ADMIN — ATORVASTATIN CALCIUM 40 MG: 40 TABLET, FILM COATED ORAL at 19:45

## 2021-01-17 RX ADMIN — ASPIRIN 325 MG ORAL TABLET 325 MG: 325 PILL ORAL at 10:30

## 2021-01-17 RX ADMIN — AMLODIPINE BESYLATE 5 MG: 5 TABLET ORAL at 10:30

## 2021-01-17 ASSESSMENT — ACTIVITIES OF DAILY LIVING (ADL)
ADLS_ACUITY_SCORE: 16
ADLS_ACUITY_SCORE: 12
ADLS_ACUITY_SCORE: 16
ADLS_ACUITY_SCORE: 16

## 2021-01-17 NOTE — PLAN OF CARE
Pt here with ischemic stroke. Alert to self. Pt slow to respond with some word finding difficulties, Pt slight L facial droop. At times refused parts of neuro exam. Pt denies pain. Regular diet. Up with assist of 1 GB and walker.     Pt a greenlight on stoplight aggression tool.   Pt belongings at bedside.

## 2021-01-17 NOTE — PROGRESS NOTES
[facility]    Medicine Progress Note - Hospitalist Service       Date of Admission:  1/14/2021  Assessment & Plan       Mr. Jose Vazquez is a 75-year-old male patient with history including hypertension, COPD, BPH, who does not follow with a doctor regularly and is not on medications, who presents with abrupt onset of speech impairment and gait disturbance and found to have suffered an acute stroke.        Subacutre left basal ganglia stroke with possible petechial hemorrhage within the above infarct.  Chronic-appearing infarct in the inferior medial right cerebellum.  Multiple probable chronic microhemorrhages within both cerebral hemispheres and cerebellum.  Right carotid stenosis  * The patient presented with speech/language impairment and gait disturbance. Code stroke was called.  Head CT without contrast showed new hyperdensity in the inferior medial right cerebellum concerning for an age indeterminate infarct and acute ischemia not excluded.  CTA head and neck did not show any acute arterial thromboembolism, but there was noted to be moderate stenosis of the distal left P2 branch as well as marked atherosclerotic disease in the RIGHT carotid artery resulting in approximately 70% stenosis by NASCET criteria.  CT head perfusion was noted to be unremarkable.    * After discussion with Stroke Neurology the patient declined peripheral tPA.  He had a subsequent MRI that showed signs of an left basal ganglia subacute infarct; signs of possible petechial hemorrhage within the infarct; more chronic-appearing infarct in the inferior medial right cerebellum; there were also multiple probable chronic microhemorrhages within both cerebral hemispheres and the cerebellum.  - A1c 5.6%, LDL 93  - Telemetry.   - Continue  mg daily  - Start atorvastatin 40 mg daily  - TTE - EF normal, normal sized atria, unclear if bubble pursued?  - PT/OT/ST - diet advanced, rec ARU at this point (SW consulted and  appreciated)  - Blood pressure management as below.   - Neurology stroke signing off 1/16  - regarding right carotid stenosis, should be noted that CTA report has contradicting info in description and impression - images and discussion with neurology confirm that it is RIGHT carotid stenosis and therefore contralateral to L BG stroke. Neurology recommends medical management and follow up in 3m with imaging.  - 1/16 improved, no new deficits, more conversant today, still seems forgetful vs unable to verbalize at times.  Neuro stroke signing off. EEG per neuro note is unremarkable.  - 1/17 no changes, awaiting TCU/ARU     Benign essential hypertension with elevated blood pressures, suspect due to above.    The patient has history of hypertension, but not on any medications.  He does not appear to follow with a doctor regularly.  It appears that he used to be on blood pressure pills including lisinopril-HCTZ.  - PRN IV hydralazine and PRN IV labetalol per stroke protocol.    - 1/16 started amlodipine 5 daily, will take 2-3 days to see full effect     Acute kidney injury, suspect prerenal - improving  The patient has creatinine 1.41 on adm.   - We will order normal saline 100 mL an hour for 15 hours.   - 1/15 creat 1.10 (improving)  - Avoid nephrotoxic medications.      Hypokalemia, improved  2.8 on adm, improved to 3.2 on AM of 1/15.   - The patient was given potassium replacement.       Chronic obstructive pulmonary disease history.    He smoked for over 40 years, a pack a day, but quit a few years ago.  Not requiring any inhalers.  - Order PRN albuterol.      Asymptomatic COVID evaluation.   - The patient will be asymptomatically screened for COVID-19.         Diet: Combination Diet Regular Diet Adult    DVT Prophylaxis: Pneumatic Compression Devices  Shen Catheter: not present  Code Status: Full Code           Disposition Plan   Expected discharge: ARU vs TCU when accepted  Entered: John Prasad MD 01/17/2021,  9:43 AM       The patient's care was discussed with the Bedside Nurse and Patient.    John Prasad MD  Hospitalist Service  [facility]  Contact information available via [facility] Paging/Directory    ______________________________________________________________________    Interval History   Patient seen and examined.  Essentially unchanged exam and conversation since last couple days.  No new complaints-denies shortness of breath, pain, or new tingling numbness or weakness.  ARU/TCU anticipated.  Patient continues to have intermittent difficulty responding at times.    Data reviewed today: I reviewed all medications, new labs and imaging results over the last 24 hours. I personally reviewed no images or EKG's today.    Physical Exam   Vital Signs: Temp: 97.7  F (36.5  C) Temp src: Oral BP: (!) 149/98 Pulse: 86   Resp: 18 SpO2: 94 % O2 Device: None (Room air)    Weight: 172 lbs 11.2 oz    Gen: NAD, pleasant  HEENT: Normocephalic, EOMI, MMM  Resp: no crackles,  no wheezes, no increased work of resp  CV: S1S2 heard, reg rhythm, reg rate, no pitting pedal edema  Abdo: soft, nontender, nondistended, bowel sounds present  Ext: calves nontender, well perfused  Neuro: AAOx3,  CN grossly intact, no facial asymmetry - continued intermittent difficulty verbalizing vs forgetfulness      Data   Recent Labs   Lab 01/17/21  0923 01/16/21  1455 01/15/21  2330 01/15/21  0623 01/15/21  0623 01/14/21  1927 01/14/21  1453   WBC  --   --   --   --  10.3 12.6* 12.5*   HGB  --   --   --   --  14.5 15.2 16.3   MCV  --   --   --   --  87 87 88   PLT  --   --   --   --  235 249 298   INR  --   --   --   --   --   --  1.12     --   --   --  140 139 141   POTASSIUM 3.8 4.1 3.4   < > 3.2* 2.9* 2.8*   CHLORIDE 109  --   --   --  107 103 104   CO2 26  --   --   --  28 28 31   BUN 16  --   --   --  21 22 22   CR 1.01  --   --   --  1.10 1.26* 1.41*   ANIONGAP 4  --   --   --  5 8 6   MARCO ANTONIO 8.9  --   --   --  8.8 8.9  9.6   *  --   --   --  107* 118* 119*   ALBUMIN  --   --   --   --   --  3.4  --    PROTTOTAL  --   --   --   --   --  7.7  --    BILITOTAL  --   --   --   --   --  1.0  --    ALKPHOS  --   --   --   --   --  99  --    ALT  --   --   --   --   --  28  --    AST  --   --   --   --   --  16  --     < > = values in this interval not displayed.     No results found for this or any previous visit (from the past 24 hour(s)).

## 2021-01-17 NOTE — CONSULTS
Care Management Initial Consult    General Information  Assessment completed with: Patient,    Type of CM/SW Visit: Initial Assessment    Primary Care Provider verified and updated as needed:     Readmission within the last 30 days:        Reason for Consult: discharge planning  Advance Care Planning:            Communication Assessment  Patient's communication style: spoken language (English or Bilingual)    Hearing Difficulty or Deaf: no   Wear Glasses or Blind: yes    Cognitive  Cognitive/Neuro/Behavioral: .WDL except, mood/behavior  Level of Consciousness: confused  Arousal Level: opens eyes spontaneously  Orientation: disoriented to, time  Mood/Behavior: calm  Best Language: 1 - Mild to moderate  Speech: word-finding difficulty    Living Environment:   People in home: alone     Current living Arrangements: apartment      Able to return to prior arrangements:   ARU is recommended by therapy yet pt does not have any supports at home or nearby to assist post rehab. A longer rehab stay seems warranted given this information. ARASH discussed TCU with pt and he is agreeable.  TCU referrals out.       Family/Social Support:  Care provided by: self  Provides care for:    Marital Status: Single  None          Description of Support System:      Support Assessment: Lacks necessary supervision and assistance. Pt lives alone and has no one he wants to list for emergency contact.  Pt lives rather isolated lifestyle. He states he has lived in his home alone for a long time (cannot remember exact dates) and anticipates return to his apartment post rehab.    Current Resources:   Skilled Home Care Services:  none  Community Resources:  none  Equipment currently used at home: none(doesn't know if he owns any equipment)  Supplies currently used at home:      Employment/Financial:  Employment Status: retired        Financial Concerns:   Pt has [employer]       Lifestyle & Psychosocial Needs:        Socioeconomic History      Marital status: Single     Spouse name: Not on file     Number of children: 0     Years of education: Not on file     Highest education level: Not on file   Occupational History     Occupation:      Tobacco Use     Smoking status: Former Smoker     Packs/day: 1.00     Years: 42.00     Pack years: 42.00     Types: Cigarettes     Start date: 1/12/2016     Smokeless tobacco: Never Used   Substance and Sexual Activity     Alcohol use: Yes     Alcohol/week: 0.0 standard drinks     Comment: rarely     Drug use: No     Sexual activity: Yes     Partners: Female       Functional Status:  Prior to admission patient needed assistance: Pt was independent in all ADL's. Pt drives. He has no family or close friends.  Pt found to be having difficulty walking by  who was familiar with him and he called for wellbeing check and pt brought to hospital.     Mental Health Status:        Chemical Dependency Status:          Values/Beliefs:  Spiritual, Cultural Beliefs, Tenriism Practices, Values that affect care:                 Additional Information:  TCU referrals out.    AGAPITO Garcia  [facility]  [phone]

## 2021-01-17 NOTE — PLAN OF CARE
Pt here with acute ischemic left basal ganglia stroke. A+Ox3 (Disorientated to time). VSS on RA- BP is elevated but within parameters <220. Neuros: Slow to respond with WFD, hesitant speech. Slight L facial droop. Pt uncooperative for parts of neuro exam- see flowsheets. Tele: NSR. Regular diet, thin liquids. Up with Ax1 GBW. Intermittently incontinent. Denies pain. Scoring green on the Aggression Stop Light Tool. Discharge to ARU pending.

## 2021-01-17 NOTE — PLAN OF CARE
"BP (!) 149/98 (BP Location: Right arm)   Pulse 86   Temp 97.7  F (36.5  C) (Oral)   Resp 18   Ht 1.854 m (6' 1\")   Wt 78.3 kg (172 lb 11.2 oz)   SpO2 94%   BMI 22.79 kg/m      Nursing shift note  -VSS ex htn  -No changes from previous days in terms of behavior, still refuses various neuro check elements  -Plan is to discharge today or tomorrow as possible    Neuros:  -Disoriented to time  -Slight right facial droop (seems like nursing staff can't decide if it is right or left...Currently it looks like a right droop)  -Neuros intact  -Unable or unwilling to perform plantarflection on right leg    Behavior & Aggression Tool color:  Green       Pt's belongings:     (Belongings from admission day present in pt's room)    Home medications:  None  "

## 2021-01-18 ENCOUNTER — TELEPHONE (OUTPATIENT)
Dept: NEUROSURGERY | Facility: CLINIC | Age: 76
End: 2021-01-18

## 2021-01-18 ENCOUNTER — APPOINTMENT (OUTPATIENT)
Dept: PHYSICAL THERAPY | Facility: CLINIC | Age: 76
DRG: 064 | End: 2021-01-18
Attending: PHYSICIAN ASSISTANT
Payer: COMMERCIAL

## 2021-01-18 ENCOUNTER — APPOINTMENT (OUTPATIENT)
Dept: SPEECH THERAPY | Facility: CLINIC | Age: 76
DRG: 064 | End: 2021-01-18
Attending: PHYSICIAN ASSISTANT
Payer: COMMERCIAL

## 2021-01-18 PROCEDURE — 92526 ORAL FUNCTION THERAPY: CPT | Mod: GN | Performed by: SPEECH-LANGUAGE PATHOLOGIST

## 2021-01-18 PROCEDURE — 120N000001 HC R&B MED SURG/OB

## 2021-01-18 PROCEDURE — 250N000013 HC RX MED GY IP 250 OP 250 PS 637: Performed by: HOSPITALIST

## 2021-01-18 PROCEDURE — 250N000013 HC RX MED GY IP 250 OP 250 PS 637: Performed by: PHYSICIAN ASSISTANT

## 2021-01-18 PROCEDURE — 97116 GAIT TRAINING THERAPY: CPT | Mod: GP

## 2021-01-18 PROCEDURE — 250N000013 HC RX MED GY IP 250 OP 250 PS 637: Performed by: INTERNAL MEDICINE

## 2021-01-18 PROCEDURE — 99232 SBSQ HOSP IP/OBS MODERATE 35: CPT | Performed by: HOSPITALIST

## 2021-01-18 PROCEDURE — 92507 TX SP LANG VOICE COMM INDIV: CPT | Mod: GN | Performed by: SPEECH-LANGUAGE PATHOLOGIST

## 2021-01-18 RX ADMIN — AMLODIPINE BESYLATE 5 MG: 5 TABLET ORAL at 09:51

## 2021-01-18 RX ADMIN — ATORVASTATIN CALCIUM 40 MG: 40 TABLET, FILM COATED ORAL at 20:03

## 2021-01-18 RX ADMIN — ASPIRIN 325 MG ORAL TABLET 325 MG: 325 PILL ORAL at 09:51

## 2021-01-18 ASSESSMENT — ACTIVITIES OF DAILY LIVING (ADL)
ADLS_ACUITY_SCORE: 16

## 2021-01-18 NOTE — PLAN OF CARE
Pt here with ischemic stroke. A&O only to self. Difficulty to assess neuro d/t limited cooperation from pt.  WFD (see flow sheet). VSS per MD 's ordered parameters. Tele SR w/PVC x 1. Regular diet. Takes pills whole. Up with one assist, GB & walker. Incontinent of bowel and bladder. Denies pain. Pt scoring greenon the Aggression Stop Light Tool. Plan for discharge pending improvement.

## 2021-01-18 NOTE — PLAN OF CARE
"BP (!) 142/105 (BP Location: Right arm)   Pulse 105   Temp 98.5  F (36.9  C) (Oral)   Resp 18   Ht 1.854 m (6' 1\")   Wt 78.3 kg (172 lb 11.2 oz)   SpO2 95%   BMI 22.79 kg/m      Nursing shift note  -VSS  -Moves assist of 1 gb walker  -Noticed no recorded bm for 5 days, will tell next shift reza give prn bowel meds.  -Seems very stable and ready for discharge    Neuros:  -No changes  -Sl left droop  -4/5 strength in legs  -uncooperative with smile test and tongue deviation test    Behavior & Aggression Tool color:  -Green    Pt's belongings:     (Belongings from admission day present in pt's room)    Home medications:   none      "

## 2021-01-18 NOTE — PROGRESS NOTES
[facility]    Medicine Progress Note - Hospitalist Service       Date of Admission:  1/14/2021  Assessment & Plan       Mr. Jose Vazquez is a 75-year-old male patient with history including hypertension, COPD, BPH, who does not follow with a doctor regularly and is not on medications, who presents with abrupt onset of speech impairment and gait disturbance and found to have suffered an acute stroke.        Subacutre left basal ganglia stroke with possible petechial hemorrhage within the above infarct  Chronic-appearing infarct in the inferior medial right cerebellum  Multiple probable chronic microhemorrhages within both cerebral hemispheres and cerebellum  Right carotid stenosis  * The patient presented with speech/language impairment and gait disturbance. Code stroke was called.  Head CT without contrast showed new hyperdensity in the inferior medial right cerebellum concerning for an age indeterminate infarct and acute ischemia not excluded.  CTA head and neck did not show any acute arterial thromboembolism, but there was noted to be moderate stenosis of the distal left P2 branch as well as marked atherosclerotic disease in the left carotid artery resulting in approximately 70% stenosis by NASCET criteria.  CT head perfusion was noted to be unremarkable.    * After discussion with Stroke Neurology the patient declined peripheral tPA.  He had a subsequent MRI that showed signs of an left basal ganglia subacute infarct; signs of possible petechial hemorrhage within the infarct; more chronic-appearing infarct in the inferior medial right cerebellum; there were also multiple probable chronic microhemorrhages within both cerebral hemispheres and the cerebellum.  - A1c 5.6%, LDL 93  - Telemetry.   - Continue  mg daily  - Start atorvastatin 40 mg daily  - TTE - EF normal, normal sized atria, unclear if bubble pursued?  - PT/OT/ST - diet advanced, rec ARU at this point (SW consulted and  appreciated)  - Blood pressure management as below.   - Neurology stroke signing off 1/16  - regarding right carotid stenosis, should be noted that CTA report has contradicting info in description and impression - images and discussion with neurology confirm that it is RIGHT carotid stenosis and therefore contralateral to L BG stroke. Neurology recommends medical management and follow up in 3m with imaging.  - 1/16 - improved, no new deficits, more conversant today, still seems forgetful vs unable to verbalize at times.  Neuro stroke signing off. EEG per neuro note is unremarkable.  - 1/17 -1/18 no changes, awaiting TCU/ARU     Benign essential hypertension with elevated blood pressures, suspect due to above.    The patient has history of hypertension, but not on any medications.  He does not appear to follow with a doctor regularly.    - PRN IV hydralazine and PRN IV labetalol per stroke protocol.    - started on amlodipine 5 mg daily - improved     Acute kidney injury, suspect prerenal - resolved  The patient has creatinine 1.41 on adm.   - We will order normal saline 100 mL an hour for 15 hours.   - 1/15 creat 1.10 (improving)  - 1/18 creat 1.01  - Avoid nephrotoxic medications.      Hypokalemia, improved  2.8 on adm, improved to 3.2 on AM of 1/15.   - The patient was given potassium replacement.    - follow BMP, creat improving so replacement protocol can be utilized     Chronic obstructive pulmonary disease history  He smoked for over 40 years, a pack a day, but quit a few years ago.  Not requiring any inhalers.  - PRN albuterol.      Asymptomatic COVID evaluation  - NEGATIVE PCR 1/14/21      Diet: Combination Diet Regular Diet Adult    DVT Prophylaxis: Pneumatic Compression Devices and Ambulate every shift  Shen Catheter: not present  Code Status: Full Code           Disposition Plan   Expected discharge: pending TCU acceptance  Entered: John Prasad MD 01/18/2021, 2:56 PM       The patient's care was  discussed with the Bedside Nurse and Patient.    John Prasad MD  Hospitalist Service  [facility]  Contact information available via [facility] Paging/Directory    ______________________________________________________________________    Interval History   Patient seen and examined earlier today.  Up in chair no new complaints.  Little bit more conversant today and less difficulty verbalizing responses.  Denies headache, vision changes, tingling/numbness/weakness shortness of breath or chest pain.  TCU acceptance pending.    Data reviewed today: I reviewed all medications, new labs and imaging results over the last 24 hours. I personally reviewed no images or EKG's today.    Physical Exam   Vital Signs: Temp: 97.6  F (36.4  C) Temp src: Oral BP: 116/78 Pulse: 96   Resp: 16 SpO2: 93 % O2 Device: None (Room air)    Weight: 172 lbs 11.2 oz    Gen: NAD, pleasant  HEENT: Normocephalic, EOMI, MMM  Resp: no crackles,  no wheezes, no increased work of resp  CV: S1S2 heard, reg rhythm, reg rate, no pitting pedal edema  Abdo: soft, nontender, nondistended, bowel sounds present  Ext: calves nontender, well perfused  Neuro: AAOx self and hospital, forgetful of year but redirectable, still with pauses in some responses, CN grossly intact, very slight L droop but grossly sym, moving all 4 ext      Data   Recent Labs   Lab 01/17/21  0923 01/16/21  1455 01/15/21  2330 01/15/21  0623 01/15/21  0623 01/14/21  1927 01/14/21  1453   WBC  --   --   --   --  10.3 12.6* 12.5*   HGB  --   --   --   --  14.5 15.2 16.3   MCV  --   --   --   --  87 87 88   PLT  --   --   --   --  235 249 298   INR  --   --   --   --   --   --  1.12     --   --   --  140 139 141   POTASSIUM 3.8 4.1 3.4   < > 3.2* 2.9* 2.8*   CHLORIDE 109  --   --   --  107 103 104   CO2 26  --   --   --  28 28 31   BUN 16  --   --   --  21 22 22   CR 1.01  --   --   --  1.10 1.26* 1.41*   ANIONGAP 4  --   --   --  5 8 6   MARCO ANTONIO 8.9  --   --   --   8.8 8.9 9.6   *  --   --   --  107* 118* 119*   ALBUMIN  --   --   --   --   --  3.4  --    PROTTOTAL  --   --   --   --   --  7.7  --    BILITOTAL  --   --   --   --   --  1.0  --    ALKPHOS  --   --   --   --   --  99  --    ALT  --   --   --   --   --  28  --    AST  --   --   --   --   --  16  --     < > = values in this interval not displayed.     No results found for this or any previous visit (from the past 24 hour(s)).

## 2021-01-18 NOTE — PLAN OF CARE
Pt here with ischemic stroke. Pt alert to self. Pt refuses participation in neuro exam at times. Pt speech slow and has word finding difficulty. L side facial droop, BUE 5/5, BLE 4/5. Pt had emesis x1 after dinner. Denied any further nausea after episode. Denies pain. Regular diet. Assist of 1, GB and walker.     Pt greenlight on stoplight aggression tool.   PT belongings at bedside.

## 2021-01-19 ENCOUNTER — APPOINTMENT (OUTPATIENT)
Dept: SPEECH THERAPY | Facility: CLINIC | Age: 76
DRG: 064 | End: 2021-01-19
Attending: PHYSICIAN ASSISTANT
Payer: COMMERCIAL

## 2021-01-19 ENCOUNTER — APPOINTMENT (OUTPATIENT)
Dept: PHYSICAL THERAPY | Facility: CLINIC | Age: 76
DRG: 064 | End: 2021-01-19
Attending: PHYSICIAN ASSISTANT
Payer: COMMERCIAL

## 2021-01-19 ENCOUNTER — APPOINTMENT (OUTPATIENT)
Dept: OCCUPATIONAL THERAPY | Facility: CLINIC | Age: 76
DRG: 064 | End: 2021-01-19
Attending: PHYSICIAN ASSISTANT
Payer: COMMERCIAL

## 2021-01-19 VITALS
WEIGHT: 174.2 LBS | HEIGHT: 73 IN | OXYGEN SATURATION: 95 % | SYSTOLIC BLOOD PRESSURE: 116 MMHG | TEMPERATURE: 97.9 F | BODY MASS INDEX: 23.09 KG/M2 | HEART RATE: 74 BPM | RESPIRATION RATE: 16 BRPM | DIASTOLIC BLOOD PRESSURE: 81 MMHG

## 2021-01-19 PROCEDURE — 92526 ORAL FUNCTION THERAPY: CPT | Mod: GN | Performed by: SPEECH-LANGUAGE PATHOLOGIST

## 2021-01-19 PROCEDURE — 97750 PHYSICAL PERFORMANCE TEST: CPT | Mod: GP

## 2021-01-19 PROCEDURE — 97112 NEUROMUSCULAR REEDUCATION: CPT | Mod: GP

## 2021-01-19 PROCEDURE — 250N000013 HC RX MED GY IP 250 OP 250 PS 637: Performed by: PHYSICIAN ASSISTANT

## 2021-01-19 PROCEDURE — 97530 THERAPEUTIC ACTIVITIES: CPT | Mod: GO

## 2021-01-19 PROCEDURE — 250N000013 HC RX MED GY IP 250 OP 250 PS 637: Performed by: HOSPITALIST

## 2021-01-19 PROCEDURE — 97530 THERAPEUTIC ACTIVITIES: CPT | Mod: GP

## 2021-01-19 PROCEDURE — 99239 HOSP IP/OBS DSCHRG MGMT >30: CPT | Performed by: HOSPITALIST

## 2021-01-19 PROCEDURE — 92507 TX SP LANG VOICE COMM INDIV: CPT | Mod: GN | Performed by: SPEECH-LANGUAGE PATHOLOGIST

## 2021-01-19 PROCEDURE — 97535 SELF CARE MNGMENT TRAINING: CPT | Mod: GO

## 2021-01-19 RX ORDER — ALBUTEROL SULFATE 90 UG/1
2 AEROSOL, METERED RESPIRATORY (INHALATION) EVERY 4 HOURS PRN
Status: ON HOLD | DISCHARGE
Start: 2021-01-19 | End: 2021-03-16

## 2021-01-19 RX ORDER — AMLODIPINE BESYLATE 10 MG/1
10 TABLET ORAL DAILY
Status: ON HOLD | DISCHARGE
Start: 2021-01-20 | End: 2021-03-16

## 2021-01-19 RX ORDER — ATORVASTATIN CALCIUM 40 MG/1
40 TABLET, FILM COATED ORAL EVERY EVENING
Status: ON HOLD | DISCHARGE
Start: 2021-01-19 | End: 2021-03-16

## 2021-01-19 RX ORDER — AMLODIPINE BESYLATE 10 MG/1
10 TABLET ORAL DAILY
Status: DISCONTINUED | OUTPATIENT
Start: 2021-01-19 | End: 2021-01-19 | Stop reason: HOSPADM

## 2021-01-19 RX ORDER — ASPIRIN 325 MG
325 TABLET ORAL DAILY
Status: ON HOLD | DISCHARGE
Start: 2021-01-20 | End: 2021-03-16

## 2021-01-19 RX ADMIN — AMLODIPINE BESYLATE 10 MG: 10 TABLET ORAL at 09:15

## 2021-01-19 RX ADMIN — ASPIRIN 325 MG ORAL TABLET 325 MG: 325 PILL ORAL at 09:15

## 2021-01-19 ASSESSMENT — ACTIVITIES OF DAILY LIVING (ADL)
ADLS_ACUITY_SCORE: 18

## 2021-01-19 ASSESSMENT — MIFFLIN-ST. JEOR: SCORE: 1589.05

## 2021-01-19 NOTE — DISCHARGE SUMMARY
[facility]  Hospitalist Discharge Summary      Date of Admission:  1/14/2021  Date of Discharge:  1/19/2021  Discharging Provider: John Prasad MD      Discharge Diagnoses   Subacutre left basal ganglia stroke with possible petechial hemorrhage within the above infarct  Chronic-appearing infarct in the inferior medial right cerebellum  Multiple probable chronic microhemorrhages within both cerebral hemispheres and cerebellum  Right carotid stenosis, asymptomatic  Hypertension  THADDEUS - resolved  Hypokalemia - resolved  COPD  Asymptomatic COVID 19 screening - PCR negative 1/14/21      Follow-ups Needed After Discharge   Follow-up Appointments     Follow Up and recommended labs and tests      TCU MD this week for hospitalization follow up with BMP and CBC  Neurology as scheduled             Unresulted Labs Ordered in the Past 30 Days of this Admission     No orders found from 12/15/2020 to 1/15/2021.      These results will be followed up by NA    Discharge Disposition   Discharged to short-term care facility  Condition at discharge: Stable      Hospital Course         Mr. Jose Vazquez is a 75-year-old male patient with history including hypertension, COPD, BPH, who does not follow with a doctor regularly and is not on medications, who presents with abrupt onset of speech impairment and gait disturbance and found to have suffered an acute stroke.        Subacutre left basal ganglia stroke with possible petechial hemorrhage within the above infarct  Chronic-appearing infarct in the inferior medial right cerebellum  Multiple probable chronic microhemorrhages within both cerebral hemispheres and cerebellum  Right carotid stenosis  * The patient presented with speech/language impairment and gait disturbance. Code stroke was called.  Head CT without contrast showed new hyperdensity in the inferior medial right cerebellum concerning for an age indeterminate infarct and acute ischemia not excluded.   CTA head and neck did not show any acute arterial thromboembolism, but there was noted to be moderate stenosis of the distal left P2 branch as well as marked atherosclerotic disease in the left carotid artery resulting in approximately 70% stenosis by NASCET criteria.  CT head perfusion was noted to be unremarkable.    * After discussion with Stroke Neurology the patient declined peripheral tPA.  He had a subsequent MRI that showed signs of an left basal ganglia subacute infarct; signs of possible petechial hemorrhage within the infarct; more chronic-appearing infarct in the inferior medial right cerebellum; there were also multiple probable chronic microhemorrhages within both cerebral hemispheres and the cerebellum.  - A1c 5.6%, LDL 93  - Telemetry.   - Continue  mg daily  - Started atorvastatin 40 mg daily  - TTE - EF normal, normal sized atria, unclear if bubble pursued?  - PT/OT/ST - diet advanced, rec TCU at this point (SW consulted and appreciated)  - Blood pressure management as below.   - Neurology stroke signing off 1/16  - regarding right carotid stenosis, should be noted that CTA report has contradicting info in description and impression - images and discussion with neurology confirm that it is RIGHT carotid stenosis and therefore contralateral to L BG stroke. Neurology recommends medical management and follow up in 3m with imaging.  - 1/16 - improved, no new deficits, more conversant today, still seems forgetful vs unable to verbalize at times.  Neuro stroke signing off. EEG per neuro note is unremarkable.  - 1/17 -1/18 - 1/19 no changes, awaiting TCU  - Continue PT/OT/ST at TCU, Neurology follow up in clinic ordered, 30 day event monitor ordered with results to be sent to PCP     Benign essential hypertension with elevated blood pressures, suspect due to above.    The patient has history of hypertension, but not on any medications.  He does not appear to follow with a doctor regularly. - PRN IV  hydralazine and PRN IV labetalol per stroke protocol.    - amlodipine 10mg daily - monitor for response over the next few days    Acute kidney injury, suspect prerenal - resolved  The patient has creatinine 1.41 on adm.   - We will order normal saline 100 mL an hour for 15 hours.   - 1/15 creat 1.10 (improving)  - 1/18 creat 1.01  - Avoid nephrotoxic medications.   - follow up BMP after discharge     Hypokalemia, improved  2.8 on adm, improved to 3.2 on AM of 1/15.   - The patient was given potassium replacement.    - improved/normalized     Chronic obstructive pulmonary disease history  He smoked for over 40 years, a pack a day, but quit a few years ago.  Not requiring any inhalers.  - PRN albuterol.      Asymptomatic COVID evaluation  - NEGATIVE PCR 1/14/21      Consultations This Hospital Stay   NEUROLOGY IP CONSULT  PHYSICAL THERAPY ADULT IP CONSULT  OCCUPATIONAL THERAPY ADULT IP CONSULT  SPEECH LANGUAGE PATH ADULT IP CONSULT  SWALLOW EVAL SPEECH PATH AT BEDSIDE IP CONSULT  SOCIAL WORK IP CONSULT  SMOKING CESSATION PROGRAM IP CONSULT  PHYSICAL THERAPY ADULT IP CONSULT  OCCUPATIONAL THERAPY ADULT IP CONSULT  SPEECH LANGUAGE PATH ADULT IP CONSULT    Code Status   Full Code    Time Spent on this Encounter   I, John Prasad MD, personally saw the patient today and spent greater than 30 minutes discharging this patient.       John Prasad MD  [address] STROKE CENTER  [facility] [place] [address]  Phone: [phone]  ______________________________________________________________________    Physical Exam   Vital Signs: Temp: 97.6  F (36.4  C) Temp src: Oral BP: 119/84 Pulse: 68   Resp: 16 SpO2: 94 % O2 Device: None (Room air)    Weight: 174 lbs 3.2 oz    Gen: NAD, pleasant  HEENT: Normocephalic, EOMI, MMM  Resp: no crackles,  no wheezes, no increased work of resp  CV: S1S2 heard, reg rhythm, reg rate, no pitting pedal edema  Abdo: soft, nontender, nondistended, bowel  sounds present  Ext: calves nontender, well perfused  Neuro: AAO x hospital and self, redirectable on year, CN grossly intact, no facial asymmetry; some delayed verbal response at times         Primary Care Physician   Jcarlos Barbosa    Discharge Orders      NEUROSURGERY REFERRAL      General info for SNF    Length of Stay Estimate: Short Term Care: Estimated # of Days <30  Condition at Discharge: Stable  Level of care:skilled   Rehabilitation Potential: Fair  Admission H&P remains valid and up-to-date: Yes  Recent Chemotherapy: N/A  Use Nursing Home Standing Orders: Yes     Mantoux instructions    Give two-step Mantoux (PPD) Per Facility Policy Yes     Reason for your hospital stay    Speech difficulties     Intake and output    Every shift     Daily weights    Call Provider for weight gain of more than 2 pounds per day or 5 pounds per week.     Follow Up and recommended labs and tests    TCU MD this week for hospitalization follow up with BMP and CBC  Neurology as scheduled     Activity - Up with nursing assistance     Physical Therapy Adult Consult    Evaluate and treat as clinically indicated.    Reason:  Recent L basal ganglia stroke, deconditioned     Occupational Therapy Adult Consult    Evaluate and treat as clinically indicated.    Reason:  Recent L basal ganglia stroke, deconditioned     Speech Language Path Adult Consult    Evaluate and treat as clinically indicated.    Reason:  Recent L basal ganglia stroke, deconditioned, intermittent exp aphasia     Fall precautions     Advance Diet as Tolerated    Follow this diet upon discharge: Orders Placed This Encounter      Combination Diet Regular Diet Adult       Significant Results and Procedures   Most Recent 3 CBC's:  Recent Labs   Lab Test 01/15/21  0623 01/14/21  1927 01/14/21  1453   WBC 10.3 12.6* 12.5*   HGB 14.5 15.2 16.3   MCV 87 87 88    249 298     Most Recent 3 BMP's:  Recent Labs   Lab Test 01/17/21  0923 01/16/21  1455 01/15/21  2330  01/15/21  0623 01/15/21  0623 01/14/21 1927     --   --   --  140 139   POTASSIUM 3.8 4.1 3.4   < > 3.2* 2.9*   CHLORIDE 109  --   --   --  107 103   CO2 26  --   --   --  28 28   BUN 16  --   --   --  21 22   CR 1.01  --   --   --  1.10 1.26*   ANIONGAP 4  --   --   --  5 8   MARCO ANTONIO 8.9  --   --   --  8.8 8.9   *  --   --   --  107* 118*    < > = values in this interval not displayed.     Most Recent 2 LFT's:  Recent Labs   Lab Test 01/14/21 1927 01/23/16 2059   AST 16 43   ALT 28 50   ALKPHOS 99 123   BILITOTAL 1.0 0.4     Most Recent 3 Troponin's:  Recent Labs   Lab Test 01/12/16  0045   TROPONIN 0.00     Most Recent 3 BNP's:  Recent Labs   Lab Test 01/12/16  0045   NTBNPI 1,173*     Most Recent Cholesterol Panel:  Recent Labs   Lab Test 01/14/21 1927   CHOL 151   LDL 93   HDL 39*   TRIG 93     Most Recent 6 Bacteria Isolates From Any Culture (See EPIC Reports for Culture Details):  Recent Labs   Lab Test 01/12/16  1811 01/12/16  0300 01/12/16  0250 12/29/14  1459   CULT Normal freida No growth No growth <10,000 colonies/mL mixed urogenital freida     Most Recent TSH and T4:No lab results found.  Most Recent Hemoglobin A1c:  Recent Labs   Lab Test 01/14/21 1927   A1C 5.6   ,   Results for orders placed or performed during the hospital encounter of 01/14/21   CT Head w/o Contrast    Narrative    CT SCAN OF THE HEAD WITHOUT CONTRAST   1/14/2021 3:07 PM     HISTORY: Code stroke    TECHNIQUE:  Axial images of the head and coronal reformations without  IV contrast material. Radiation dose for this scan was reduced using  automated exposure control, adjustment of the mA and/or kV according  to patient size, or iterative reconstruction technique.    COMPARISON: Brain MR 3/17/2017.    FINDINGS: No evidence of acute intracranial hemorrhage. No mass effect  or midline shift. Extensive periventricular white matter hypodensities  which may be due to chronic microvascular ischemic disease. New  wedge-shaped  hypodensity in the inferior medial right cerebellum that  was not present on 3/17/2017. Mild diffuse parenchymal volume loss. No  abnormal extra-axial fluid collection. Ventricular size is within  normal limits without evidence of hydrocephalus.    The visualized portions of the sinuses and mastoids appear normal. The  bony calvarium and bones of the skull base appear intact.       Impression    IMPRESSION:     1. No evidence of acute intracranial hemorrhage, mass, or herniation.  2. Extensive periventricular white matter hypodensities which are  nonspecific, but possibly related to chronic microvascular ischemic  disease. Superimposed acute infarct would be difficult to exclude.  3. New hyperdensity in the inferior medial right cerebellum. This is  concerning for an age-indeterminate infarct and acute ischemia is not  excluded. CT angiogram and CT perfusion to follow.      CARLOZ CHANDLER MD   CTA Head Neck with Contrast    Narrative    CT ANGIOGRAM OF THE HEAD AND NECK WITH CONTRAST  CT HEAD PERFUSION WITH CONTRAST  1/14/2021 3:08 PM     HISTORY: CODE STROKE    TECHNIQUE: CT angiography with an injection of 70mL Isovue-370  (accession [id number]), 50mL Isovue-370 (accession [id number]) IV with  scans through the head and neck. Images were transferred to a separate  3-D workstation where multiplanar reformations and 3-D images were  created. Estimates of carotid stenoses are made relative to the distal  internal carotid artery diameters except as noted. Radiation dose for  this scan was reduced using automated exposure control, adjustment of  the mA and/or kV according to patient size, or iterative  reconstruction technique.     Perfusion scans were performed with injection of additional IV  contrast. These images were processed on a separate 3-D workstation.     COMPARISON: None.     CT HEAD FINDINGS: No contrast enhancing lesions. CT perfusion images  of the head are unremarkable.     CT ANGIOGRAM HEAD FINDINGS:  The  major intracranial arteries including  the proximal branches of the anterior cerebral, middle cerebral, and  posterior cerebral arteries appear patent without vascular cutoff.  Bulbous prominence of the basilar artery. No definite aneurysm  identified.    Moderate stenosis of a distal left P2 branch (series 9 image 440).    CT ANGIOGRAM NECK FINDINGS:   Normal origin of the great vessels from the aortic arch.     Right carotid artery: The right common and internal carotid arteries  are patent. Marked soft and calcified plaque at the carotid  bifurcation and proximal internal carotid artery resulting in  approximately 70% stenosis by NASCET criteria.     Left carotid artery: The left common and internal carotid arteries are  patent. Mild atherosclerotic disease at the carotid bifurcation and  proximal internal carotid artery without significant stenosis by  NASCET criteria.     Vertebral arteries: Vertebral arteries are patent without evidence of  dissection. No significant stenosis.     Other findings: Emphysematous changes in the visualized lung apices.      Impression    IMPRESSION:   1. No definite vascular cutoff of the proximal major intracranial  arteries.  2. Moderate stenosis of a distal left P2 branch. No other significant  intracranial stenosis appreciated.   3. Marked atherosclerotic disease in the left carotid artery resulting  in approximately 70% stenosis by NASCET criteria.  4. Mild atherosclerotic disease in the left carotid artery without  significant stenosis.  5. Unremarkable CT perfusion images of the head.  6. Emphysematous changes in the visualized lung apices.    Results discussed with Tj Daniel at 3:24 PM on 1/14/2021     CARLOZ CHANDLER MD   CT Head Perfusion w Contrast    Narrative    CT ANGIOGRAM OF THE HEAD AND NECK WITH CONTRAST  CT HEAD PERFUSION WITH CONTRAST  1/14/2021 3:08 PM     HISTORY: CODE STROKE    TECHNIQUE: CT angiography with an injection of 70mL Isovue-370  (accession  [id number]), 50mL Isovue-370 (accession [id number]) IV with  scans through the head and neck. Images were transferred to a separate  3-D workstation where multiplanar reformations and 3-D images were  created. Estimates of carotid stenoses are made relative to the distal  internal carotid artery diameters except as noted. Radiation dose for  this scan was reduced using automated exposure control, adjustment of  the mA and/or kV according to patient size, or iterative  reconstruction technique.     Perfusion scans were performed with injection of additional IV  contrast. These images were processed on a separate 3-D workstation.     COMPARISON: None.     CT HEAD FINDINGS: No contrast enhancing lesions. CT perfusion images  of the head are unremarkable.     CT ANGIOGRAM HEAD FINDINGS:  The major intracranial arteries including  the proximal branches of the anterior cerebral, middle cerebral, and  posterior cerebral arteries appear patent without vascular cutoff.  Bulbous prominence of the basilar artery. No definite aneurysm  identified.    Moderate stenosis of a distal left P2 branch (series 9 image 440).    CT ANGIOGRAM NECK FINDINGS:   Normal origin of the great vessels from the aortic arch.     Right carotid artery: The right common and internal carotid arteries  are patent. Marked soft and calcified plaque at the carotid  bifurcation and proximal internal carotid artery resulting in  approximately 70% stenosis by NASCET criteria.     Left carotid artery: The left common and internal carotid arteries are  patent. Mild atherosclerotic disease at the carotid bifurcation and  proximal internal carotid artery without significant stenosis by  NASCET criteria.     Vertebral arteries: Vertebral arteries are patent without evidence of  dissection. No significant stenosis.     Other findings: Emphysematous changes in the visualized lung apices.      Impression    IMPRESSION:   1. No definite vascular cutoff of the proximal  major intracranial  arteries.  2. Moderate stenosis of a distal left P2 branch. No other significant  intracranial stenosis appreciated.   3. Marked atherosclerotic disease in the left carotid artery resulting  in approximately 70% stenosis by NASCET criteria.  4. Mild atherosclerotic disease in the left carotid artery without  significant stenosis.  5. Unremarkable CT perfusion images of the head.  6. Emphysematous changes in the visualized lung apices.    Results discussed with Tj Daniel at 3:24 PM on 1/14/2021     CARLOZ CHANDLER MD   MR Brain w/o & w Contrast    Narrative    MRI BRAIN WITHOUT AND WITH CONTRAST  1/14/2021 5:18 PM     HISTORY:  Focal neurologic deficit, less than 6 hours, stroke  suspected. Hyperacute MRI.    TECHNIQUE:  Multiplanar, multisequence MRI of the brain without and  with 8 mL Gadavist.    COMPARISON: Head CT from earlier the same day. Brain MR 3/17/2017.     FINDINGS: Area of restricted diffusion in the left basal ganglia  involving the putamen and globus pallidus concerning for infarct.  There is associated enhancement in this region suggesting that it is  more subacute in age. New foci of susceptibility hypointensity in that  area that could represent petechial hemorrhage associated with the  infarct. No large acute parenchymal hemorrhage. No midline shift or  herniation. No other definite areas of infarct. Increased signal on  the DWI sequence in the left cerebellum around an area of previous  hemorrhage is likely artifact.    Well-defined area of encephalomalacia in the inferior medial right  cerebellum has the appearance of a more chronic infarct. Extensive  periventricular white matter T2 hyperdensities are nonspecific, but  likely related to chronic microvascular ischemic disease. Moderate  diffuse parenchymal volume loss. Ventricular size is within normal  limits without evidence of hydrocephalus.    Multiple foci of susceptibility hypointensity in both cerebral  hemispheres as  well as the cerebellum which most likely represent  chronic microhemorrhages.      Impression    IMPRESSION:    1. Area of restricted diffusion in the left basal ganglia with  associated enhancement in that area. This likely represents a region  of subacute infarct.   2. New foci of susceptibility hypointensity in the left basal ganglia  along the superior margin of the region of infarct. This could  represent petechial hemorrhage within the infarct. No large  parenchymal hemorrhage is appreciated. No significant midline shift or  herniation.  3. Moderate diffuse parenchymal volume loss and extensive white matter  changes likely due to chronic microvascular ischemic disease.  4. More chronic-appearing infarct in the inferior medial right  cerebellum.  5. Multiple probable chronic microhemorrhages within both cerebral  hemispheres and the cerebellum.     Results discussed with Tj Daniel at 5:28 PM on 2021.     CARLOZ CHANDLER MD   Echocardiogram Complete - Bubble study    Narrative    [id number]  [id number]  [id number]  [id number]^MICHEL^DEEPAK^[facility]  Echocardiography Laboratory  [address] [place] [address]        Name: ORLANDO HU  MRN: [id number]  : 1945  Study Date: 01/15/2021 09:10 AM  Age: 75 yrs  Gender: Male  Patient Location: [facility]  Reason For Study: CVI  Ordering Physician: DEEPAK PEARSON  Referring Physician: Jcarlos Barbosa  Performed By: Urbano Avina RDCS     BSA: 2.0 m2  Height: 73 in  Weight: 172 lb  HR: 78  BP: 138/93 mmHg  _____________________________________________________________________________  __        Procedure  Complete Portable Bubble Echo Adult. Optison (NDC #7286-2242) given  intravenously. Limited Portable Echo Adult.  _____________________________________________________________________________  __        Interpretation Summary     1. Normal biventricular size and function. Left ventricular ejection fraction  of 60-65%. No  segmental wall motion abnormalities noted.  2. Normal sized atria.  3. No hemodynamically significant valvular disease.  4. The ascending aorta is Mildly dilated.  Compared to the previous echocardiogram on 1/12/2016, there are no significant  changes.  Technically difficult study.  _____________________________________________________________________________  __        Left Ventricle  The left ventricle is normal in size. There is normal left ventricular wall  thickness. Left ventricular systolic function is normal. The visual ejection  fraction is estimated at 60-65%. Grade I or early diastolic dysfunction. No  regional wall motion abnormalities noted.     Right Ventricle  The right ventricle is normal in size and function.     Atria  Normal left atrial size. Right atrial size is normal.     Mitral Valve  The mitral valve leaflets appear normal. There is no evidence of stenosis,  fluttering, or prolapse. There is trace mitral regurgitation.        Tricuspid Valve  The tricuspid valve is not well visualized. There is trace tricuspid  regurgitation. Right ventricular systolic pressure could not be approximated  due to inadequate tricuspid regurgitation.     Aortic Valve  The aortic valve is not well visualized. No aortic stenosis is present.     Pulmonic Valve  The pulmonic valve is not well visualized.     Vessels  Normal size aorta. The ascending aorta is Mildly dilated. IVC diameter <2.1 cm  collapsing >50% with sniff suggests a normal RA pressure of 3 mmHg.     Pericardium  There is no pericardial effusion.        Rhythm  Sinus rhythm was noted.  _____________________________________________________________________________  __  MMode/2D Measurements & Calculations  IVSd: 0.67 cm     LVIDd: 4.7 cm  LVIDs: 3.5 cm  LVPWd: 1.0 cm  FS: 24.8 %  LV mass(C)d: 130.9 grams  LV mass(C)dI: 64.9 grams/m2  Ao root diam: 3.7 cm  LA dimension: 3.1 cm  asc Aorta Diam: 3.8 cm  LA/Ao: 0.83  RWT: 0.43        Doppler Measurements &  Calculations  MV E max zeke: 61.7 cm/sec  MV A max zeke: 91.2 cm/sec  MV E/A: 0.68  MV max PG: 3.7 mmHg  MV mean P.3 mmHg  MV V2 VTI: 23.3 cm  MV dec slope: 196.9 cm/sec2  MV dec time: 0.31 sec  PA acc time: 0.12 sec  E/E' av.5  Lateral E/e': 6.6  Medial E/e': 10.5              _____________________________________________________________________________  __           Report approved by: Luis Angel Liang 01/15/2021 10:49 AM            Discharge Medications   Current Discharge Medication List      START taking these medications    Details   albuterol (PROAIR HFA/PROVENTIL HFA/VENTOLIN HFA) 108 (90 Base) MCG/ACT inhaler Inhale 2 puffs into the lungs every 4 hours as needed for wheezing or shortness of breath / dyspnea  Qty:      Comments: Pharmacy may dispense brand covered by insurance (Proair, or proventil or ventolin or generic albuterol inhaler)  Associated Diagnoses: Pulmonary emphysema, unspecified emphysema type (H)      amLODIPine (NORVASC) 10 MG tablet Take 1 tablet (10 mg) by mouth daily  Qty:      Associated Diagnoses: Benign essential hypertension      aspirin (ASA) 325 MG tablet 1 tablet (325 mg) by Oral or NG Tube route daily  Qty:      Associated Diagnoses: Acute ischemic stroke (H)      atorvastatin (LIPITOR) 40 MG tablet Take 1 tablet (40 mg) by mouth every evening  Qty:      Associated Diagnoses: Acute ischemic stroke (H)           Allergies   No Known Allergies

## 2021-01-19 NOTE — PROGRESS NOTES
Pt here with ischemic stroke. A&Ox2. Pleasant. Flat. Neuros intact except slight left droop and 4/5 BLE weakness. Won't cooperate with some of the neuro assessments. VSS. Tele NSR w/ few PVCs. Regular diet with thin liquids. Takes pills whole with water. Up with A-1. Denies pain. Pt scoring green on the Aggression Stop Light Tool. Plan is to discharge to TCU soon.

## 2021-01-19 NOTE — PLAN OF CARE
Pt here with L baal ganglia ischemic stroke. A&O x1, to self only. Neuros intact with exception of strength, unable to assess some neuro as pt refuses . VSS on RA. Tele SR. Regular diet, thin liquids. Denies pain. Plan continue to monitor. Discharge pending to TCU, possibly today.

## 2021-01-19 NOTE — PROGRESS NOTES
[facility]    Medicine Progress Note - Hospitalist Service       Date of Admission:  1/14/2021  Assessment & Plan       Mr. Jose Vazquez is a 75-year-old male patient with history including hypertension, COPD, BPH, who does not follow with a doctor regularly and is not on medications, who presents with abrupt onset of speech impairment and gait disturbance and found to have suffered an acute stroke.        Subacutre left basal ganglia stroke with possible petechial hemorrhage within the above infarct  Chronic-appearing infarct in the inferior medial right cerebellum  Multiple probable chronic microhemorrhages within both cerebral hemispheres and cerebellum  Right carotid stenosis  * The patient presented with speech/language impairment and gait disturbance. Code stroke was called.  Head CT without contrast showed new hyperdensity in the inferior medial right cerebellum concerning for an age indeterminate infarct and acute ischemia not excluded.  CTA head and neck did not show any acute arterial thromboembolism, but there was noted to be moderate stenosis of the distal left P2 branch as well as marked atherosclerotic disease in the left carotid artery resulting in approximately 70% stenosis by NASCET criteria.  CT head perfusion was noted to be unremarkable.    * After discussion with Stroke Neurology the patient declined peripheral tPA.  He had a subsequent MRI that showed signs of an left basal ganglia subacute infarct; signs of possible petechial hemorrhage within the infarct; more chronic-appearing infarct in the inferior medial right cerebellum; there were also multiple probable chronic microhemorrhages within both cerebral hemispheres and the cerebellum.  - A1c 5.6%, LDL 93  - Telemetry.   - Continue  mg daily  - Started atorvastatin 40 mg daily  - TTE - EF normal, normal sized atria, unclear if bubble pursued?  - PT/OT/ST - diet advanced, rec ARU at this point (SW consulted and  appreciated)  - 30 day event monitor at discharge from ARU with result to PCP  - Blood pressure management as below.   - Neurology stroke signing off 1/16  - regarding right carotid stenosis, should be noted that CTA report has contradicting info in description and impression - images and discussion with neurology confirm that it is RIGHT carotid stenosis and therefore contralateral to L BG stroke. Neurology recommends medical management and follow up in 3m with imaging.  - 1/16 - improved, no new deficits, more conversant today, still seems forgetful vs unable to verbalize at times.  Neuro stroke signing off. EEG per neuro note is unremarkable.  - 1/17 -1/18 - 1/19 no changes, awaiting TCU - accepted for 1/20     Benign essential hypertension with elevated blood pressures, suspect due to above.    The patient has history of hypertension, but not on any medications.  He does not appear to follow with a doctor regularly. - PRN IV hydralazine and PRN IV labetalol per stroke protocol.    - amlodipine 10mg daily - monitor for response over the next few days    Acute kidney injury, suspect prerenal - resolved  The patient has creatinine 1.41 on adm.   - We will order normal saline 100 mL an hour for 15 hours.   - 1/15 creat 1.10 (improving)  - 1/18 creat 1.01  - Avoid nephrotoxic medications.   - follow up BMP after discharge     Hypokalemia, improved  2.8 on adm, improved to 3.2 on AM of 1/15.   - The patient was given potassium replacement.    - improved/normalized     Chronic obstructive pulmonary disease history  He smoked for over 40 years, a pack a day, but quit a few years ago.  Not requiring any inhalers.  - PRN albuterol.      Asymptomatic COVID evaluation  - NEGATIVE PCR 1/14/21      Diet: Combination Diet Regular Diet Adult    DVT Prophylaxis: Pneumatic Compression Devices  Shen Catheter: not present  Code Status: Full Code           Disposition Plan   Expected discharge: Tomorrow, to TCU now that it appears  he has been accepted.  Entered: John Prasad MD 01/19/2021, 2:22 PM       The patient's care was discussed with the Bedside Nurse and Patient.    John Prasad MD  Hospitalist Service  [facility]  Contact information available via [facility] Paging/Directory    ______________________________________________________________________    Interval History   Patient seen and examined earlier today.  No new complaints or issues.  Patient denies headache, vision changes, shortness of breath, or chest pain.  Continues to have intermittent delayed verbalization of speech and some disorientation.  TCU acceptance acquired for 1/20.    Data reviewed today: I reviewed all medications, new labs and imaging results over the last 24 hours. I personally reviewed no images or EKG's today.    Physical Exam   Vital Signs: Temp: 97.6  F (36.4  C) Temp src: Oral BP: 119/84 Pulse: 68   Resp: 16 SpO2: 94 % O2 Device: None (Room air)    Weight: 174 lbs 3.2 oz    Gen: NAD, pleasant  HEENT: Normocephalic, EOMI, MMM  Resp: no crackles,  no wheezes, no increased work of resp  CV: S1S2 heard, reg rhythm, reg rate, no pitting pedal edema  Abdo: soft, nontender, nondistended, bowel sounds present  Ext: calves nontender, well perfused  Neuro: [facility] and self, redirectable on year, CN grossly intact, no facial asymmetry; some delayed verbal response at times      Data   Recent Labs   Lab 01/17/21  0923 01/16/21  1455 01/15/21  2330 01/15/21  0623 01/15/21  0623 01/14/21  1927 01/14/21  1453   WBC  --   --   --   --  10.3 12.6* 12.5*   HGB  --   --   --   --  14.5 15.2 16.3   MCV  --   --   --   --  87 87 88   PLT  --   --   --   --  235 249 298   INR  --   --   --   --   --   --  1.12     --   --   --  140 139 141   POTASSIUM 3.8 4.1 3.4   < > 3.2* 2.9* 2.8*   CHLORIDE 109  --   --   --  107 103 104   CO2 26  --   --   --  28 28 31   BUN 16  --   --   --  21 22 22   CR 1.01  --   --   --  1.10 1.26* 1.41*    ANIONGAP 4  --   --   --  5 8 6   MARCO ANTONIO 8.9  --   --   --  8.8 8.9 9.6   *  --   --   --  107* 118* 119*   ALBUMIN  --   --   --   --   --  3.4  --    PROTTOTAL  --   --   --   --   --  7.7  --    BILITOTAL  --   --   --   --   --  1.0  --    ALKPHOS  --   --   --   --   --  99  --    ALT  --   --   --   --   --  28  --    AST  --   --   --   --   --  16  --     < > = values in this interval not displayed.     No results found for this or any previous visit (from the past 24 hour(s)).

## 2021-01-19 NOTE — PROGRESS NOTES
Care Management Discharge Note    Discharge Date: 01/19/21  Expected Time of Departure: 1830    Discharge Disposition: Skilled Nursing Facilty    Discharge Services:      Discharge DME:      Discharge Transportation: health plan transportation, other (see comments)    Private pay costs discussed: transportation costs, pt aware and agrees    PAS Confirmation Code: [id number]  Patient/family educated on Medicare website which has current facility and service quality ratings: no    Education Provided on the Discharge Plan:  yes  Persons Notified of Discharge Plans: yes  Patient/Family in Agreement with the Plan: yes    Handoff Referral Completed: Yes    Additional Information:    Orders and PAS faxed to facility. No further SW interventions anticipated at this time.    PAS-RR    D: Per DHS regulation, SW completed and submitted PAS-RR to [place] [employer] Direct Connect via the Senior [employer] Line.  PAS-RR confirmation # is : [id number]    P: Further questions may be directed to Senior [employer] Line at #[phone], option #4 for PAS-RR staff.          AGAPITO Chavez   [facility]  [phone]      ,

## 2021-01-19 NOTE — PROGRESS NOTES
Care Management Follow Up    Length of Stay (days): 5    Expected Discharge Date: 01/19/21     Concerns to be Addressed: discharge planning     Patient plan of care discussed at interdisciplinary rounds: Yes    Anticipated Discharge Disposition: Transitional Care     Anticipated Discharge Services:    Anticipated Discharge DME:      Patient/family educated on Medicare website which has current facility and service quality ratings: yes  Education Provided on the Discharge Plan:    Patient/Family in Agreement with the Plan:      Referrals Placed by CM/SW:    Private pay costs discussed: Not applicable    Additional Information:    Pt has been accepted for tomorrow, 1/20 to [facility], in the afternoon. Pt was declined at other facilities d/t no bed availability. Pt can transport w/[facility] aide. ARASH will update Charge RN.       AGAPITO Chavez   [facility]  [phone]      UPDATE@1313: Pt has been accepted at [facility]. Pt in agreement with this, requests HE w/c transport accepting costs. Earliest available: 6:30 pm.  ARASH updated facility and cancelled [facility] admission for tomorrow. Awaiting orders.

## 2021-01-19 NOTE — PLAN OF CARE
A&O, forgetful.  VSS, RA.  Neuro unchanged.  Incontinent at times.  Up with 1 and walker.  Will discharge pt to TCU around 1830 with HE transport.

## 2021-01-19 NOTE — PLAN OF CARE
Subacute & chronic strokes/right carotid stenosis. Disoriented to time/situation/place. Neuros/CMS - calm/uncooperative with all assessment - left facial weakness, word finding difficulty noted/slow speech, makes his needs known, denying decreased sensation/lower extremity weakness,  VSS, room air - nonproductive cough. Tele NSR.  Combo regular diet/set up & assisted with ordering/pills whole with water. Up with 1 assist/gait belt/walker to bathroom - unsteady gait. Incontinent/continent of urine. - bm yesterday. Denies pain. Pt scoring green on the Aggression Stop Light Tool. Possible discharge to [facility] tomorrow.

## 2021-01-20 NOTE — PLAN OF CARE
Occupational Therapy Discharge Summary    Reason for therapy discharge:    Discharged to transitional care facility.    Progress towards therapy goal(s). See goals on Care Plan in [facility] electronic health record for goal details.  Goals partially met.  Barriers to achieving goals:   discharge from facility.    Therapy recommendation(s):    Continued therapy is recommended.  Rationale/Recommendations:  Per POC--rec. TCU;Pt lives alone and will benefit from longer rehab stay and longer term safe d/c planning . Pt. discharged to [facility] 1/19/21.

## 2021-01-20 NOTE — PROGRESS NOTES
Physical Therapy Discharge Summary    Reason for therapy discharge:    Discharged to transitional care facility.    Progress towards therapy goal(s). See goals on Care Plan in [facility] electronic health record for goal details.  Goals not met.  Barriers to achieving goals:   discharge from facility.    Therapy recommendation(s):    Continued therapy is recommended.  Rationale/Recommendations:  below baseline.        01/19/21 1449   Signing Clinician's Name / Credentials   Signing clinician's name / credentials Brittany Dressler, PT   Quick Adds   Rehab Discipline PT   Physical Performance Test or Measurement   Treatment Detail mCTSIB positive: EO ground (+) 15sec the Vidal, EC ground 5sec then heavy Vidal, unable on Aerex.      Attempted VOR but pt struggling to perform despite verbal and visual cues.    Neuromuscular Re-Education   Minutes of Treatment 30   Symptoms Noted During/After Treatment fatigue;shortness of breath  ( O2 98 midway break)   Treatment Detail PT: Functional balance training: mCTSIB extended, 250' + 250' no AD unsteady, avoids R head turns, unsteaady with horizontal > vertical head movements, R path deviation, R LE dyscontrol, quick to fatigue.   PT Discharge Planning    PT Discharge Recommendation (DC Rec) Transitional Care Facility   Additional Documentation   PT Plan PT: Functional balance, vestib progressions.   Total Session Time   Total Session Time (minutes) 30 minutes

## 2021-01-20 NOTE — PLAN OF CARE
Speech Language Therapy Discharge Summary    Reason for therapy discharge:    Discharged to transitional care facility.    Progress towards therapy goal(s). See goals on Care Plan in [facility] electronic health record for goal details.  Goals partially met.  Barriers to achieving goals:   discharge from facility.    Therapy recommendation(s):    Continued therapy is recommended.  Rationale/Recommendations:  Continue ST needs for speech/language deficits to improve functional communication.

## 2021-02-04 NOTE — TELEPHONE ENCOUNTER
Left second message to schedule for stroke clinic follow up. Patient does not have any additional contact names/numbers to follow up with as well.

## 2021-02-17 ENCOUNTER — APPOINTMENT (OUTPATIENT)
Dept: CT IMAGING | Facility: CLINIC | Age: 76
DRG: 070 | End: 2021-02-17
Attending: EMERGENCY MEDICINE
Payer: COMMERCIAL

## 2021-02-17 ENCOUNTER — APPOINTMENT (OUTPATIENT)
Dept: GENERAL RADIOLOGY | Facility: CLINIC | Age: 76
DRG: 070 | End: 2021-02-17
Attending: EMERGENCY MEDICINE
Payer: COMMERCIAL

## 2021-02-17 ENCOUNTER — HOSPITAL ENCOUNTER (INPATIENT)
Facility: CLINIC | Age: 76
LOS: 27 days | Discharge: HOME HEALTH | DRG: 070 | End: 2021-03-17
Attending: EMERGENCY MEDICINE | Admitting: HOSPITALIST
Payer: COMMERCIAL

## 2021-02-17 DIAGNOSIS — N39.0 URINARY TRACT INFECTION WITHOUT HEMATURIA, SITE UNSPECIFIED: ICD-10-CM
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
## 2021-02-17 DIAGNOSIS — R62.7 ADULT FAILURE TO THRIVE: ICD-10-CM

## 2021-02-17 DIAGNOSIS — J43.9 PULMONARY EMPHYSEMA, UNSPECIFIED EMPHYSEMA TYPE (H): ICD-10-CM

## 2021-02-17 DIAGNOSIS — I63.9 ACUTE ISCHEMIC STROKE (H): ICD-10-CM

## 2021-02-17 DIAGNOSIS — I10 BENIGN ESSENTIAL HYPERTENSION: ICD-10-CM

## 2021-02-17 LAB
ALBUMIN UR-MCNC: 30 MG/DL
ANION GAP SERPL CALCULATED.3IONS-SCNC: 6 MMOL/L (ref 3–14)
ANION GAP SERPL CALCULATED.3IONS-SCNC: NORMAL MMOL/L (ref 6–17)
APPEARANCE UR: CLEAR
BASOPHILS # BLD AUTO: 0.1 10E9/L (ref 0–0.2)
BASOPHILS NFR BLD AUTO: 0.3 %
BILIRUB UR QL STRIP: NEGATIVE
BUN SERPL-MCNC: 19 MG/DL (ref 7–30)
BUN SERPL-MCNC: NORMAL MG/DL (ref 7–30)
CALCIUM SERPL-MCNC: 8.9 MG/DL (ref 8.5–10.1)
CALCIUM SERPL-MCNC: NORMAL MG/DL (ref 8.5–10.1)
CHLORIDE SERPL-SCNC: 110 MMOL/L (ref 94–109)
CHLORIDE SERPL-SCNC: NORMAL MMOL/L (ref 94–109)
CO2 SERPL-SCNC: 23 MMOL/L (ref 20–32)
CO2 SERPL-SCNC: NORMAL MMOL/L (ref 20–32)
COLOR UR AUTO: YELLOW
CREAT SERPL-MCNC: 1.22 MG/DL (ref 0.66–1.25)
CREAT SERPL-MCNC: NORMAL MG/DL (ref 0.66–1.25)
DIFFERENTIAL METHOD BLD: ABNORMAL
EOSINOPHIL # BLD AUTO: 0 10E9/L (ref 0–0.7)
EOSINOPHIL NFR BLD AUTO: 0.2 %
ERYTHROCYTE [DISTWIDTH] IN BLOOD BY AUTOMATED COUNT: 12.4 % (ref 10–15)
ETHANOL SERPL-MCNC: <0.01 G/DL
GFR SERPL CREATININE-BSD FRML MDRD: 58 ML/MIN/{1.73_M2}
GFR SERPL CREATININE-BSD FRML MDRD: NORMAL ML/MIN/{1.73_M2}
GLUCOSE SERPL-MCNC: 134 MG/DL (ref 70–99)
GLUCOSE SERPL-MCNC: NORMAL MG/DL (ref 70–99)
GLUCOSE UR STRIP-MCNC: NEGATIVE MG/DL
HCT VFR BLD AUTO: 53.8 % (ref 40–53)
HGB BLD-MCNC: 17.5 G/DL (ref 13.3–17.7)
HGB UR QL STRIP: ABNORMAL
IMM GRANULOCYTES # BLD: 0.1 10E9/L (ref 0–0.4)
IMM GRANULOCYTES NFR BLD: 0.6 %
KETONES UR STRIP-MCNC: NEGATIVE MG/DL
LABORATORY COMMENT REPORT: NORMAL
LACTATE BLD-SCNC: 1.5 MMOL/L (ref 0.7–2)
LEUKOCYTE ESTERASE UR QL STRIP: ABNORMAL
LYMPHOCYTES # BLD AUTO: 0.9 10E9/L (ref 0.8–5.3)
LYMPHOCYTES NFR BLD AUTO: 4.8 %
MCH RBC QN AUTO: 28.4 PG (ref 26.5–33)
MCHC RBC AUTO-ENTMCNC: 32.5 G/DL (ref 31.5–36.5)
MCV RBC AUTO: 87 FL (ref 78–100)
MONOCYTES # BLD AUTO: 1.1 10E9/L (ref 0–1.3)
MONOCYTES NFR BLD AUTO: 5.6 %
MUCOUS THREADS #/AREA URNS LPF: PRESENT /LPF
NEUTROPHILS # BLD AUTO: 17.1 10E9/L (ref 1.6–8.3)
NEUTROPHILS NFR BLD AUTO: 88.5 %
NITRATE UR QL: NEGATIVE
NRBC # BLD AUTO: 0 10*3/UL
NRBC BLD AUTO-RTO: 0 /100
PH UR STRIP: 6.5 PH (ref 5–7)
PLATELET # BLD AUTO: 262 10E9/L (ref 150–450)
POTASSIUM SERPL-SCNC: 3.7 MMOL/L (ref 3.4–5.3)
POTASSIUM SERPL-SCNC: NORMAL MMOL/L (ref 3.4–5.3)
PROCALCITONIN SERPL-MCNC: 0.07 NG/ML
RBC # BLD AUTO: 6.17 10E12/L (ref 4.4–5.9)
RBC #/AREA URNS AUTO: 104 /HPF (ref 0–2)
SARS-COV-2 RNA RESP QL NAA+PROBE: NEGATIVE
SODIUM SERPL-SCNC: 139 MMOL/L (ref 133–144)
SODIUM SERPL-SCNC: NORMAL MMOL/L (ref 133–144)
SOURCE: ABNORMAL
SP GR UR STRIP: 1.02 (ref 1–1.03)
SPECIMEN SOURCE: NORMAL
SQUAMOUS #/AREA URNS AUTO: <1 /HPF (ref 0–1)
TROPONIN I SERPL-MCNC: <0.015 UG/L (ref 0–0.04)
UROBILINOGEN UR STRIP-MCNC: 2 MG/DL (ref 0–2)
WBC # BLD AUTO: 19.4 10E9/L (ref 4–11)
WBC #/AREA URNS AUTO: 40 /HPF (ref 0–5)

## 2021-02-17 PROCEDURE — 83605 ASSAY OF LACTIC ACID: CPT | Performed by: HOSPITALIST

## 2021-02-17 PROCEDURE — 93005 ELECTROCARDIOGRAM TRACING: CPT

## 2021-02-17 PROCEDURE — 71046 X-RAY EXAM CHEST 2 VIEWS: CPT

## 2021-02-17 PROCEDURE — 82077 ASSAY SPEC XCP UR&BREATH IA: CPT | Performed by: EMERGENCY MEDICINE

## 2021-02-17 PROCEDURE — 87635 SARS-COV-2 COVID-19 AMP PRB: CPT | Performed by: EMERGENCY MEDICINE

## 2021-02-17 PROCEDURE — 250N000011 HC RX IP 250 OP 636: Performed by: HOSPITALIST

## 2021-02-17 PROCEDURE — 87086 URINE CULTURE/COLONY COUNT: CPT | Performed by: EMERGENCY MEDICINE

## 2021-02-17 PROCEDURE — G0378 HOSPITAL OBSERVATION PER HR: HCPCS

## 2021-02-17 PROCEDURE — 81001 URINALYSIS AUTO W/SCOPE: CPT | Performed by: EMERGENCY MEDICINE

## 2021-02-17 PROCEDURE — 99220 PR INITIAL OBSERVATION CARE,LEVEL III: CPT | Performed by: HOSPITALIST

## 2021-02-17 PROCEDURE — 84484 ASSAY OF TROPONIN QUANT: CPT | Performed by: HOSPITALIST

## 2021-02-17 PROCEDURE — 80048 BASIC METABOLIC PNL TOTAL CA: CPT | Performed by: EMERGENCY MEDICINE

## 2021-02-17 PROCEDURE — 84145 PROCALCITONIN (PCT): CPT | Performed by: HOSPITALIST

## 2021-02-17 PROCEDURE — C9803 HOPD COVID-19 SPEC COLLECT: HCPCS

## 2021-02-17 PROCEDURE — 258N000003 HC RX IP 258 OP 636: Performed by: HOSPITALIST

## 2021-02-17 PROCEDURE — 99285 EMERGENCY DEPT VISIT HI MDM: CPT | Mod: 25

## 2021-02-17 PROCEDURE — 96374 THER/PROPH/DIAG INJ IV PUSH: CPT

## 2021-02-17 PROCEDURE — 36415 COLL VENOUS BLD VENIPUNCTURE: CPT | Performed by: HOSPITALIST

## 2021-02-17 PROCEDURE — 85025 COMPLETE CBC W/AUTO DIFF WBC: CPT | Performed by: EMERGENCY MEDICINE

## 2021-02-17 PROCEDURE — 70450 CT HEAD/BRAIN W/O DYE: CPT

## 2021-02-17 RX ORDER — ASPIRIN 325 MG
325 TABLET ORAL DAILY
Status: DISCONTINUED | OUTPATIENT
Start: 2021-02-18 | End: 2021-03-17 | Stop reason: HOSPADM

## 2021-02-17 RX ORDER — AMLODIPINE BESYLATE 10 MG/1
10 TABLET ORAL DAILY
Status: DISCONTINUED | OUTPATIENT
Start: 2021-02-18 | End: 2021-02-17

## 2021-02-17 RX ORDER — METOPROLOL TARTRATE 1 MG/ML
2.5 INJECTION, SOLUTION INTRAVENOUS EVERY 4 HOURS PRN
Status: DISCONTINUED | OUTPATIENT
Start: 2021-02-17 | End: 2021-02-27

## 2021-02-17 RX ORDER — SODIUM CHLORIDE 9 MG/ML
INJECTION, SOLUTION INTRAVENOUS CONTINUOUS
Status: ACTIVE | OUTPATIENT
Start: 2021-02-17 | End: 2021-02-18

## 2021-02-17 RX ORDER — BISACODYL 10 MG
10 SUPPOSITORY, RECTAL RECTAL DAILY PRN
Status: DISCONTINUED | OUTPATIENT
Start: 2021-02-17 | End: 2021-03-17 | Stop reason: HOSPADM

## 2021-02-17 RX ORDER — ACETAMINOPHEN 325 MG/1
650 TABLET ORAL EVERY 4 HOURS PRN
Status: DISCONTINUED | OUTPATIENT
Start: 2021-02-17 | End: 2021-03-17 | Stop reason: HOSPADM

## 2021-02-17 RX ORDER — POLYETHYLENE GLYCOL 3350 17 G/17G
17 POWDER, FOR SOLUTION ORAL DAILY PRN
Status: DISCONTINUED | OUTPATIENT
Start: 2021-02-17 | End: 2021-03-17 | Stop reason: HOSPADM

## 2021-02-17 RX ORDER — ACETAMINOPHEN 650 MG/1
650 SUPPOSITORY RECTAL EVERY 4 HOURS PRN
Status: DISCONTINUED | OUTPATIENT
Start: 2021-02-17 | End: 2021-03-17 | Stop reason: HOSPADM

## 2021-02-17 RX ORDER — HYDRALAZINE HYDROCHLORIDE 20 MG/ML
10 INJECTION INTRAMUSCULAR; INTRAVENOUS EVERY 4 HOURS PRN
Status: DISCONTINUED | OUTPATIENT
Start: 2021-02-17 | End: 2021-03-17 | Stop reason: HOSPADM

## 2021-02-17 RX ORDER — ONDANSETRON 2 MG/ML
4 INJECTION INTRAMUSCULAR; INTRAVENOUS EVERY 6 HOURS PRN
Status: DISCONTINUED | OUTPATIENT
Start: 2021-02-17 | End: 2021-03-17 | Stop reason: HOSPADM

## 2021-02-17 RX ORDER — CEFTRIAXONE 1 G/1
1 INJECTION, POWDER, FOR SOLUTION INTRAMUSCULAR; INTRAVENOUS EVERY 24 HOURS
Status: DISCONTINUED | OUTPATIENT
Start: 2021-02-17 | End: 2021-02-18

## 2021-02-17 RX ORDER — ONDANSETRON 4 MG/1
4 TABLET, ORALLY DISINTEGRATING ORAL EVERY 6 HOURS PRN
Status: DISCONTINUED | OUTPATIENT
Start: 2021-02-17 | End: 2021-03-17 | Stop reason: HOSPADM

## 2021-02-17 RX ADMIN — SODIUM CHLORIDE: 9 INJECTION, SOLUTION INTRAVENOUS at 23:38

## 2021-02-17 RX ADMIN — CEFTRIAXONE SODIUM 1 G: 1 INJECTION, POWDER, FOR SOLUTION INTRAMUSCULAR; INTRAVENOUS at 22:40

## 2021-02-17 ASSESSMENT — ENCOUNTER SYMPTOMS
HEMATURIA: 0
NAUSEA: 0
ABDOMINAL PAIN: 0
FREQUENCY: 0
NUMBNESS: 0
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
## 2021-02-18 ENCOUNTER — APPOINTMENT (OUTPATIENT)
Dept: PHYSICAL THERAPY | Facility: CLINIC | Age: 76
DRG: 070 | End: 2021-02-18
Attending: HOSPITALIST
Payer: COMMERCIAL

## 2021-02-18 LAB
ALBUMIN SERPL-MCNC: 3.2 G/DL (ref 3.4–5)
ALP SERPL-CCNC: 123 U/L (ref 40–150)
ALT SERPL W P-5'-P-CCNC: 39 U/L (ref 0–70)
ANION GAP SERPL CALCULATED.3IONS-SCNC: 6 MMOL/L (ref 3–14)
AST SERPL W P-5'-P-CCNC: 25 U/L (ref 0–45)
BACTERIA SPEC CULT: NO GROWTH
BILIRUB SERPL-MCNC: 0.4 MG/DL (ref 0.2–1.3)
BUN SERPL-MCNC: 12 MG/DL (ref 7–30)
CALCIUM SERPL-MCNC: 8.6 MG/DL (ref 8.5–10.1)
CHLORIDE SERPL-SCNC: 111 MMOL/L (ref 94–109)
CK SERPL-CCNC: 39 U/L (ref 30–300)
CO2 SERPL-SCNC: 26 MMOL/L (ref 20–32)
CREAT SERPL-MCNC: 0.84 MG/DL (ref 0.66–1.25)
ERYTHROCYTE [DISTWIDTH] IN BLOOD BY AUTOMATED COUNT: 12.5 % (ref 10–15)
GFR SERPL CREATININE-BSD FRML MDRD: 87 ML/MIN/{1.73_M2}
GLUCOSE SERPL-MCNC: 92 MG/DL (ref 70–99)
HCT VFR BLD AUTO: 43.3 % (ref 40–53)
HGB BLD-MCNC: 13.9 G/DL (ref 13.3–17.7)
INTERPRETATION ECG - MUSE: NORMAL
Lab: NORMAL
MCH RBC QN AUTO: 28.3 PG (ref 26.5–33)
MCHC RBC AUTO-ENTMCNC: 32.1 G/DL (ref 31.5–36.5)
MCV RBC AUTO: 88 FL (ref 78–100)
PLATELET # BLD AUTO: 197 10E9/L (ref 150–450)
POTASSIUM SERPL-SCNC: 3.7 MMOL/L (ref 3.4–5.3)
PROT SERPL-MCNC: 6.6 G/DL (ref 6.8–8.8)
RBC # BLD AUTO: 4.92 10E12/L (ref 4.4–5.9)
SODIUM SERPL-SCNC: 143 MMOL/L (ref 133–144)
SPECIMEN SOURCE: NORMAL
TSH SERPL DL<=0.005 MIU/L-ACNC: 0.81 MU/L (ref 0.4–4)
WBC # BLD AUTO: 8.6 10E9/L (ref 4–11)

## 2021-02-18 PROCEDURE — 120N000001 HC R&B MED SURG/OB

## 2021-02-18 PROCEDURE — 250N000013 HC RX MED GY IP 250 OP 250 PS 637: Performed by: HOSPITALIST

## 2021-02-18 PROCEDURE — 99233 SBSQ HOSP IP/OBS HIGH 50: CPT | Performed by: STUDENT IN AN ORGANIZED HEALTH CARE EDUCATION/TRAINING PROGRAM

## 2021-02-18 PROCEDURE — G0378 HOSPITAL OBSERVATION PER HR: HCPCS

## 2021-02-18 PROCEDURE — 84443 ASSAY THYROID STIM HORMONE: CPT | Performed by: HOSPITALIST

## 2021-02-18 PROCEDURE — 80053 COMPREHEN METABOLIC PANEL: CPT | Performed by: HOSPITALIST

## 2021-02-18 PROCEDURE — 85027 COMPLETE CBC AUTOMATED: CPT | Performed by: HOSPITALIST

## 2021-02-18 PROCEDURE — 82550 ASSAY OF CK (CPK): CPT | Performed by: HOSPITALIST

## 2021-02-18 PROCEDURE — 97530 THERAPEUTIC ACTIVITIES: CPT | Mod: GP

## 2021-02-18 PROCEDURE — 36415 COLL VENOUS BLD VENIPUNCTURE: CPT | Performed by: HOSPITALIST

## 2021-02-18 PROCEDURE — 97161 PT EVAL LOW COMPLEX 20 MIN: CPT | Mod: GP

## 2021-02-18 PROCEDURE — 97116 GAIT TRAINING THERAPY: CPT | Mod: GP

## 2021-02-18 RX ADMIN — ASPIRIN 325 MG ORAL TABLET 325 MG: 325 PILL ORAL at 10:01

## 2021-02-18 ASSESSMENT — ACTIVITIES OF DAILY LIVING (ADL): ADLS_ACUITY_SCORE: 17

## 2021-02-18 NOTE — PROGRESS NOTES
[facility]    Medicine Progress Note - Hospitalist Service       Date of Admission:  2/17/2021  Assessment & Plan       Jose Vazqeuz is a 75-year-old male with a medical history of recent stroke who presented to the emergency department on 2/17/2021 after he was found in the community down after a fall.    Fall, likely in the setting of physical deconditioning  Patient not able to give any significant collateral history.  Based on his weakness and PT eval's, likely this was due to physical deconditioning.  -TTE without any valvular abnormalities, telemetry NSR, laboratory work-up negative including TSH, CK, LFTs, troponin  - continue tele  - PT/OT    Concern for cognitive impairment vs Acute encephalopathy (post stroke vs TBI vs other)  Concern for vulnerable adult  Patient previously living independently prior to his recent stroke.  After that he was discharged to a TCU which he left AMA from.  He does not recall the events leading to his AMA discharge.  Although he is oriented, significant concerns related to his decision-making ability. Unclear cause for this.  -OT for cognitive eval  -Social work consult, they will file vulnerable adult  -Psych consult to assist with decision-making capacity.    - Refer patient to UR to assess if inpatient status appropriate. Patient declining transfer to acceptable TCU for unclear reasons, unclear decisional ability, concern for sig risk to self if he were to discharge to community as he demonstrated inability to care for self (pre-renal THADDEUS, signing out AMA with no clear reason, etc). Likely will need longer stay to sort out decisional ability in this post stroke patient with limited past medical history.     Hematuria  Noted on urinalysis for many years  -Follow-up with outpatient urology discharge    Concern for UTI  -Patient denies urinary symptoms  -Urine culture with no organisms  -Discontinue ceftriaxone    HTN  -Hold PTA amlodipine  -As needed's  available    Subacute left basal ganglia stroke with possible petechial hemorrhage within the above infarct  Chronic-appearing infarct in the inferior medial right cerebellum  Multiple probable chronic microhemorrhages within both cerebral hemispheres and cerebellum  Right carotid stenosis  Presented on 1/14/2021 with speech/language impairment and gait disturbance.  On review of chart patient was then offered TPA, he had declined.   A1c 5.6%, LDL 93. TTE - EF normal, normal sized atria  -Restart PTA aspirin 325 mg oral daily  -outpatient f/u with neurology for carotid stenosis  - hold lipitor while obs status.     THADDEUS, pre-renal, resolved  During admission last month noted to have a creatinine of 1.41 that improved to 1.01.  Have again presents with creatinine of 1.22.  Gentle hydration with normal saline overnight, monitor creatinine in a.m.  Avoid nephrotoxic medications.     Chronic obstructive pulmonary disease history  He smoked for over 40 years, a pack a day, but quit a few years ago.  Not requiring any inhalers.  Monitor        Diet: Combination Diet Low Saturated Fat Na <2400mg Diet    DVT Prophylaxis: Pneumatic Compression Devices  Shen Catheter: not present  Code Status: Full Code           Disposition Plan   Expected discharge: 1-2 days, recommended to transitional care unit once safe disposition plan/ TCU bed available.  Entered: Tyler Henderson MD 02/18/2021, 3:15 PM       The patient's care was discussed with the Bedside Nurse, Care Coordinator/ and Patient.    >35min spent with >50% in counseling or coordination of care.    Tyler Henderson MD  Hospitalist Service  [facility]  Contact information available via [facility] Paging/Directory    ______________________________________________________________________    Interval History   Seen today in the a.m.  He states that he is feeling well and has baseline and would like to discharge from the hospital to go  "home.  He remembers that he was at some sort of care facility prior to his admission.  He knows that he left that facility due to his \"rights\".  He is not entirely clear why he made the decision to leave.  He does not remember the events of his prior hospitalization including his stroke.  He does not remember the events of the fall that brought him to the hospital.  He states he \"fell.  That is all there is to say.\"  He cannot expound on any associated symptoms.  His 8 point review of systems is negative.      Data reviewed today: I reviewed all medications, new labs and imaging results over the last 24 hours. I personally reviewed no images or EKG's today.    Physical Exam   Vital Signs: Temp: 97.9  F (36.6  C) Temp src: Oral BP: 118/79 Pulse: 82   Resp: 16 SpO2: 95 % O2 Device: None (Room air)    Weight: 171 lbs 4.76 oz  Constitutional: Awake, alert, cooperative, no apparent cardiopulmonary distress.  Eyes: Conjunctiva and pupils examined and normal.  HEENT: Moist mucous membranes, normal dentition.  Respiratory: Clear to auscultation bilaterally, no crackles or wheezing.  Cardiovascular: Regular rate and rhythm, normal S1 and S2, and no murmur noted.  GI: Soft, non-distended, non-tender, normal bowel sounds.  Lymph/Hematologic: No anterior cervical or supraclavicular adenopathy.  Skin: No rashes, no cyanosis, no edema noted on exposed skin.  Musculoskeletal: No joint swelling, erythema or tenderness. No gross bony abnormalities  Neurologic: Cranial nerves 2-12 grossly intact, normal strength and sensation.  Psychiatric: Alert, oriented to person, place, no obvious anxiety or depression. Confused about events of prior days.  Somewhat disheveled appearance with overgrown fingernails and unkempt beard and hair.      Data   Recent Labs   Lab 02/18/21  0815 02/17/21  2219 02/17/21  1726 02/17/21  1656   WBC 8.6  --   --  19.4*   HGB 13.9  --   --  17.5   MCV 88  --   --  87     --   --  262     --  139 " Canceled, Test credited   POTASSIUM 3.7  --  3.7 Canceled, Test credited   CHLORIDE 111*  --  110* Canceled, Test credited   CO2 26  --  23 Canceled, Test credited   BUN 12  --  19 Canceled, Test credited   CR 0.84  --  1.22 Canceled, Test credited   ANIONGAP 6  --  6 Canceled, Test credited   MARCO ANTONIO 8.6  --  8.9 Canceled, Test credited   GLC 92  --  134* Canceled, Test credited   ALBUMIN 3.2*  --   --   --    PROTTOTAL 6.6*  --   --   --    BILITOTAL 0.4  --   --   --    ALKPHOS 123  --   --   --    ALT 39  --   --   --    AST 25  --   --   --    TROPI  --  <0.015  --   --      Recent Results (from the past 24 hour(s))   CT Head w/o Contrast    Narrative    CT SCAN OF THE HEAD WITHOUT CONTRAST   2/17/2021 5:15 PM     HISTORY: Stroke, follow up    TECHNIQUE:  Axial images of the head and coronal reformations without  IV contrast material. Radiation dose for this scan was reduced using  automated exposure control, adjustment of the mA and/or kV according  to patient size, or iterative reconstruction technique.    COMPARISON: Head MRI 1/14/2021, head CT 1/14/2021    FINDINGS:  Moderate volume loss is present. Patchy and confluent white matter  hypoattenuation likely represents advanced chronic small vessel  ischemic change. Subacute infarct involving the left basal ganglia as  expected compared to the prior exam. Old appearing infarcts are  present involving the isidro and small old infarcts are present  involving the bilateral cerebellar hemispheres. No evidence of acute  hemorrhage, mass effect or hydrocephalus.     The visualized calvarium, tympanic cavities, mastoid cavities, and  paranasal sinuses are unremarkable.      Impression    IMPRESSION:     1. Expected evolution of subacute infarct involving the left basal  ganglia.  2. Volume loss, chronic small vessel ischemic change infarcts, and  multiple old infarcts.    SABA CAIN MD   Chest XR,  PA & LAT    Narrative    CHEST TWO VIEWS    2/17/2021 5:58 PM      HISTORY: Leukocytosis    COMPARISON: Chest x-ray 1/12/2016.      Impression    IMPRESSION: PA and lateral views of the chest. Lungs are clear. Heart  is normal in size. No effusions are evident. No pneumothorax.  Degenerative thoracic spine changes are noted.    ZAFAR OCHOA MD     Medications       aspirin  325 mg Oral or NG Tube Daily     cefTRIAXone  1 g Intravenous Q24H

## 2021-02-18 NOTE — CONSULTS
Care Management Initial Consult    General Information  Assessment completed with: patient and information obtained by [facility] staff.       Primary Care Provider verified and updated as needed:     Readmission within the last 30 days:           Advance Care Planning:            Communication Assessment  Patient's communication style: spoken language (English or Bilingual)    Hearing Difficulty or Deaf: no   Wear Glasses or Blind: yes    Cognitive  Cognitive/Neuro/Behavioral: .WDL except  Level of Consciousness: alert  Arousal Level: opens eyes spontaneously  Orientation: disoriented to, time  Mood/Behavior: calm, cooperative  Best Language: 0 - No aphasia  Speech: clear    Living Environment:   People in home:       Current living Arrangements: apartment      Able to return to prior arrangements:         Family/Social Support:  Care provided by:    Provides care for:                  Description of Support System:           Current Resources:   Patient receiving home care services:       Community Resources:    Equipment currently used at home: none  Supplies currently used at home:      Employment/Financial:  Employment Status:          Financial Concerns:             Lifestyle & Psychosocial Needs:        Socioeconomic History     Marital status: Single     Spouse name: Not on file     Number of children: 0     Years of education: Not on file     Highest education level: Not on file   Occupational History     Occupation:      Tobacco Use     Smoking status: Former Smoker     Packs/day: 1.00     Years: 42.00     Pack years: 42.00     Types: Cigarettes     Start date: 1/12/2016     Smokeless tobacco: Never Used   Substance and Sexual Activity     Alcohol use: Yes     Alcohol/week: 0.0 standard drinks     Comment: rarely     Drug use: No     Sexual activity: Yes     Partners: Female       Functional Status:  Prior to admission patient needed assistance:              Mental Health Status:  Night  RN note relects patient is oriented only to self           Chemical Dependency Status:                Values/Beliefs:  Spiritual, Cultural Beliefs, Adventism Practices, Values that affect care:                 Additional Information  [facility] shares yesterday they had a care conference with patient.  They explained his insurance was no longer authorizing SNF benefits however [facility] did not feel it was safe for him to discharge home where he lives alone.  They recommended he stay at their facility on the LTC unit and they would work with him toward an retirement placement.   Patient was not in agreement and walked out of the building.    Patient reports they wouldn't provide transportation and that is why he left to walk home.   Patient was found fallen on street.   [facility].  The facility is willing to accept patient back.    When writer spoke with patient about his discharge plan from the hospital, he said he plans to return to his house and has no intention of returning the [facility]. .   \He confirms he has no local family.  Per hospital care coordinator he has a local friend.     PT is recommending patient return to TCU and we are awaiting OT evaluation of patient's cognition.    This writer  also will be making a report to Adult Protection to report that following his AMA from the TCU he was found fallen on a street by bystanders. The OT evaluation of patient's cognition and their recommendations will be reviewed by MD.  The next step will depend upon the OT evaluation.    Heather Walker, TOÑOSW

## 2021-02-18 NOTE — ED NOTES
[facility]  ED Nurse Handoff Report    ED Chief complaint: Fall and Cold Exposure      ED Diagnosis:   Final diagnoses:   Adult failure to thrive   Fall, initial encounter   Urinary tract infection without hematuria, site unspecified       Code Status: Full Code    Allergies: No Known Allergies    Patient Story: fall, failure to thrive  Focused Assessment:  Pt left AMA from [facility] today where he was staying for TCU after stroke last month. Pt had a fall at some point today when 2 bystanders brought pt into hospital to be checked out. Pt is very unkept. Pt will need social work services    Treatments and/or interventions provided: IVF  Patient's response to treatments and/or interventions: good    To be done/followed up on inpatient unit:  see SW    Does this patient have any cognitive concerns?: none    Activity level - Baseline/Home:  Independent  Activity Level - Current:   Independent    Patient's Preferred language: English   Needed?: No    Isolation: None  Infection: Not Applicable  Patient tested for COVID 19 prior to admission: YES  Bariatric?: No    Vital Signs:   Vitals:    02/17/21 1408   BP: 97/64   Pulse: 51   Temp: 98.1  F (36.7  C)   TempSrc: Oral   SpO2: 99%       Cardiac Rhythm:     Was the PSS-3 completed:   Yes  What interventions are required if any?               Family Comments: none  OBS brochure/video discussed/provided to patient/family: Yes              Name of person given brochure if not patient: pt              Relationship to patient:  pt    For the majority of the shift this patient's behavior was Green.   Behavioral interventions performed were none.    ED NURSE PHONE NUMBER: *[id number]

## 2021-02-18 NOTE — ED NOTES
"RN called [facility]. Staff there states that pt signed AMA form around 1100 today and left the facility on his own. Pt stated to RN that he left them on \"bad terms\"  "

## 2021-02-18 NOTE — PROGRESS NOTES
Observation goals PRIOR TO DISCHARGE     Comments: -diagnostic tests and consults completed and resulted - no  -vital signs normal or at patient baseline - met   -tolerating oral intake to maintain hydration - met  Nurse to notify provider when observation goals have been met and patient is ready for discharge.

## 2021-02-18 NOTE — PLAN OF CARE
Cognitive Concerns/ Orientation: A&Ox1 - to self only   BEHAVIOR & AGGRESSION TOOL COLOR: green  CIWA SCORE: n/a  ABNL VS/O2: VSS, r/a, tele NS  MOBILITY: Ax1  PAIN MANAGMENT: denies pain  DIET: regular  BOWEL/BLADDER: Continent/incontinent  ABNL LAB/BG: Urine culture, troponin (normal)  DRAIN/DEVICES: PIV L - continuous  TELEMETRY RHYTHM: Tele NSR  SKIN: dry, intact  TESTS/PROCEDURES: PT, OT, SW consults  D/C DAY/GOALS/PLACE: Pending  OTHER IMPORTANT INFO: Neuro checks Q4H, SBA

## 2021-02-18 NOTE — PROGRESS NOTES
RECEIVING UNIT ED HANDOFF REVIEW    ED Nurse Handoff Report was reviewed by: Sydney Barnard RN on February 17, 2021 at 9:07 PM

## 2021-02-18 NOTE — PROGRESS NOTES
02/18/21 0822   Quick Adds   Quick Adds Certification   Type of Visit Initial PT Evaluation   Living Environment   People in home alone   Current Living Arrangements apartment   Home Accessibility no concerns   Transportation Anticipated car, drives self   Living Environment Comments No steps to enter, no assistive equipment at home   Self-Care   Usual Activity Tolerance good   Current Activity Tolerance good   Equipment Currently Used at Home none   Activity/Exercise/Self-Care Comment Patient states he left TCU because he felt back to baseline. At baseline, patient was independent with all mobility and ADLs, he drove.    Disability/Function   Hearing Difficulty or Deaf no   Wear Glasses or Blind yes   Vision Management glasses   Concentrating, Remembering or Making Decisions Difficulty no   Difficulty Communicating no   Difficulty Eating/Swallowing no   Walking or Climbing Stairs Difficulty no   Dressing/Bathing Difficulty no   Toileting issues no   Fall history within last six months yes   Number of times patient has fallen within last six months 1   Change in Functional Status Since Onset of Current Illness/Injury yes   General Information   Onset of Illness/Injury or Date of Surgery 02/17/21   Referring Physician Maira Elizabeth MD   Patient/Family Therapy Goals Statement (PT) to go home independently   Pertinent History of Current Problem (include personal factors and/or comorbidities that impact the POC) Patient is a 76 YO M admitted after leaving his TCU AMA and fell while walking home, no injuries reports. He was admitted in January 2021 with an acute CVA. PMH: COPD, HTN   Existing Precautions/Restrictions fall   Weight-Bearing Status - LUE full weight-bearing   Weight-Bearing Status - RUE full weight-bearing   Weight-Bearing Status - LLE full weight-bearing   Weight-Bearing Status - RLE full weight-bearing   General Observations Activity orders: ambulate with assist   Cognition   Orientation Status  (Cognition) oriented to;person;place;verbal cues/prompts needed for orientation   Affect/Mental Status (Cognition) flat/blunted affect   Follows Commands (Cognition) delayed response/completion;increased processing time needed;repetition of directions required;verbal cues/prompting required;follows one-step commands;over 90% accuracy   Safety Deficit (Cognition) insight into deficits/self-awareness;judgment   Memory Deficit (Cognition) short-term memory   Cognitive Status Comments Patient unable to state why he is here in the hospital, initially states he fell but then later denies any falls.    Pain Assessment   Patient Currently in Pain No   Integumentary/Edema   Integumentary/Edema no deficits were identifed   Posture    Posture Forward head position   Range of Motion (ROM)   ROM Comment B LE AROM WFL except R ankle DF    Strength   Strength Comments Baseline R foot drop otherwise B hip flexion 4/5 and B knee extension 4+/5   Bed Mobility   Bed Mobility supine-sit;sit-supine   Supine-Sit [place] (Bed Mobility) independent   Sit-Supine [place] (Bed Mobility) independent   Transfers   Transfers sit-stand transfer   Sit-Stand Transfer   Sit-Stand [place] (Transfers) supervision   Sit/Stand Transfer Comments Sit <> stand from edge of bed with minimal use of hands, mild unsteadiness noted   Gait/Stairs (Locomotion)   [place] Level (Gait) contact guard   Pattern (Gait) step-through   Deviations/Abnormal Patterns (Gait) right sided deviations   Right Sided Gait Deviations foot drop/toe drag  (chronic)   Comment (Gait/Stairs) Patient ambulated pushing IV pole and lateral path devaitions but no overt losses of balance.    Balance   Balance other (describe)   Balance Comments Min assist or use of grab bar for external support with hygiene in bathroom. Good sitting balance - able to don/doff socks on edge of bed without difficulty.    Sensory Examination   Sensory Perception patient reports no sensory  changes   Clinical Impression   Criteria for Skilled Therapeutic Intervention yes, treatment indicated   PT Diagnosis (PT) unsteady gait   Influenced by the following impairments impaired balance, impaired cognition, R foot drop   Functional limitations due to impairments increased difficulty with ambulation and functional transfers   Clinical Presentation Stable/Uncomplicated   Clinical Presentation Rationale medical status, stable vitals   Clinical Decision Making (Complexity) low complexity   Therapy Frequency (PT) 5x/week   Predicted Duration of Therapy Intervention (days/wks) 5 tx days   Planned Therapy Interventions (PT) balance training;gait training;home exercise program;neuromuscular re-education;motor coordination training;patient/family education;strengthening;transfer training   Risk & Benefits of therapy have been explained evaluation/treatment results reviewed;care plan/treatment goals reviewed;participants voiced agreement with care plan;risks/benefits reviewed;current/potential barriers reviewed;patient   PT Discharge Planning    PT Discharge Recommendation (DC Rec) Transitional Care Facility   PT Rationale for DC Rec Ptient lives alone with no local assist, demonstrates unsteady gait and impaired cognition impacing safety with mobility. He currently appears unsafe to return home at this time.    PT Brief overview of current status  CGA for sit <> stand transfers, CGA for ambulation with and without assistive device   Therapy Certification   Start of care date 02/18/21   Certification date from 02/18/21   Certification date to 02/24/21   Medical Diagnosis Adult failure to thrive   Total Evaluation Time   Total Evaluation Time (Minutes) 9
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
## 2021-02-18 NOTE — UTILIZATION REVIEW
Admission Status; Secondary Review Determination    Under the authority of the Utilization Management Committee, the utilization review process indicated a secondary review on the above patient. The review outcome is based on review of the medical records, discussions with staff, and applying clinical experience noted on the date of the review.    (x) Inpatient Status Appropriate - This patient's medical care is consistent with medical management for inpatient care and reasonable inpatient medical practice.    RATIONALE FOR DETERMINATION: Complex 75-year-old male who had a recent stroke and was discharged to TCU due to significant multiple prior strokes and microhemorrhages.  Patient previously independent including driving and snow removal.  Post stroke patient had significant ataxic gait as well as concern for some cognitive issues.  Patient left TCU AGAINST MEDICAL ADVICE and proceeded to fall and require rehospitalization.  Patient requires further rehab as well as evaluation of cognitive functioning and possible post stroke delirium prior to determining optimal/safe discharge planning with expectation of greater than 2 nights in the hospital.    At the time of admission with the information available to the attending physician more than 2 nights Hospital complex care was anticipated, based on patient risk of adverse outcome if treated as outpatient and complex care required. Inpatient admission is appropriate based on the Medicare guidelines.    This document was produced using voice recognition software    The information on this document is developed by the utilization review team in order for the business office to ensure compliance. This only denotes the appropriateness of proper admission status and does not reflect the quality of care rendered.    The definitions of Inpatient Status and Observation Status used in making the determination above are those provided in the CMS Coverage Manual, Chapter 1  and Chapter 6, section 70.4.    Sincerely,    Yovany Avendaño MD  Utilization Review  Physician Advisor  [facility].

## 2021-02-18 NOTE — PHARMACY-ADMISSION MEDICATION HISTORY
Pharmacy Medication History  Admission medication history interview status for the 2/17/2021 admission is complete. See EPIC admission navigator for prior to admission medications     Location of Interview: Patient room  Medication history sources: Patient    >>>  Patient reports taking no medications prior to this admission.  The medications listed were prescribed at discharge 1/19/2021 however, patient never started any of these medications        Time spent in this activity: 15 minutes    Prior to Admission medications    Medication Sig Last Dose Taking? Auth Provider   albuterol (PROAIR HFA/PROVENTIL HFA/VENTOLIN HFA) 108 (90 Base) MCG/ACT inhaler Inhale 2 puffs into the lungs every 4 hours as needed for wheezing or shortness of breath / dyspnea   John Prasad MD   amLODIPine (NORVASC) 10 MG tablet Take 1 tablet (10 mg) by mouth daily   John Prasad MD   aspirin (ASA) 325 MG tablet 1 tablet (325 mg) by Oral or NG Tube route daily   John Prasad MD   atorvastatin (LIPITOR) 40 MG tablet Take 1 tablet (40 mg) by mouth every evening   John Prasad MD       The information provided in this note is only as accurate as the sources available at the time of update(s)

## 2021-02-18 NOTE — PROGRESS NOTES
[unfilled]                                                                              [facility]      OUTPATIENT PHYSICAL THERAPY EVALUATION  PLAN OF TREATMENT FOR OUTPATIENT REHABILITATION  (COMPLETE FOR INITIAL CLAIMS ONLY)  Patient's Last Name, First Name, M.I.  YOB: 1945  Jose Vazquez                        Provider's Name  [facility] Medical Record No.  [id number]                               Onset Date:  02/17/21   Start of Care Date:  02/18/21      Type:     _X_PT   ___OT   ___SLP Medical Diagnosis:  Adult failure to thrive                        PT Diagnosis:  unsteady gait   Visits from SOC:  1   _________________________________________________________________________________  Plan of Treatment/Functional Goals    Planned Interventions: balance training, gait training, home exercise program, neuromuscular re-education, motor coordination training, patient/family education, strengthening, transfer training     Goals: See Physical Therapy Goals on Care Plan in [facility] electronic health record.    Therapy Frequency: 5x/week  Predicted Duration of Therapy Intervention: 5 tx days  _________________________________________________________________________________    I CERTIFY THE NEED FOR THESE SERVICES FURNISHED UNDER        THIS PLAN OF TREATMENT AND WHILE UNDER MY CARE     (Physician co-signature of this document indicates review and certification of the therapy plan).                Certification date from: 02/18/21, Certification date to: 02/24/21    Referring Physician: Maira Elizabeth MD            Initial Assessment        See Physical Therapy evaluation dated 02/18/21 in Epic electronic health record.

## 2021-02-19 ENCOUNTER — APPOINTMENT (OUTPATIENT)
Dept: OCCUPATIONAL THERAPY | Facility: CLINIC | Age: 76
DRG: 070 | End: 2021-02-19
Attending: HOSPITALIST
Payer: COMMERCIAL

## 2021-02-19 PROCEDURE — 99233 SBSQ HOSP IP/OBS HIGH 50: CPT | Performed by: STUDENT IN AN ORGANIZED HEALTH CARE EDUCATION/TRAINING PROGRAM

## 2021-02-19 PROCEDURE — 97535 SELF CARE MNGMENT TRAINING: CPT | Mod: GO

## 2021-02-19 PROCEDURE — 97166 OT EVAL MOD COMPLEX 45 MIN: CPT | Mod: GO

## 2021-02-19 PROCEDURE — 97530 THERAPEUTIC ACTIVITIES: CPT | Mod: GO

## 2021-02-19 PROCEDURE — 250N000013 HC RX MED GY IP 250 OP 250 PS 637: Performed by: HOSPITALIST

## 2021-02-19 PROCEDURE — 120N000001 HC R&B MED SURG/OB

## 2021-02-19 RX ADMIN — ASPIRIN 325 MG ORAL TABLET 325 MG: 325 PILL ORAL at 08:10

## 2021-02-19 ASSESSMENT — ACTIVITIES OF DAILY LIVING (ADL)
TOILETING_ISSUES: NO
CONCENTRATING,_REMEMBERING_OR_MAKING_DECISIONS_DIFFICULTY: YES
ADLS_ACUITY_SCORE: 15
PREVIOUS_RESPONSIBILITIES: MEAL PREP;HOUSEKEEPING;LAUNDRY;SHOPPING;YARDWORK;MEDICATION MANAGEMENT;FINANCES;DRIVING
ADLS_ACUITY_SCORE: 15
ADLS_ACUITY_SCORE: 15
WALKING_OR_CLIMBING_STAIRS_DIFFICULTY: NO
DRESSING/BATHING_DIFFICULTY: NO
DOING_ERRANDS_INDEPENDENTLY_DIFFICULTY: YES
VISION_MANAGEMENT: GLASSES
ADLS_ACUITY_SCORE: 17
ADLS_ACUITY_SCORE: 15
ADLS_ACUITY_SCORE: 15
DIFFICULTY_EATING/SWALLOWING: NO
DIFFICULTY_COMMUNICATING: NO
WEAR_GLASSES_OR_BLIND: YES

## 2021-02-19 NOTE — PROGRESS NOTES
[facility]    Medicine Progress Note - Hospitalist Service       Date of Admission:  2/17/2021  Assessment & Plan       Jose Vazquez is a 75-year-old male with a medical history of recent stroke who presented to the emergency department on 2/17/2021 after he was found in the community down after a fall.    Fall, likely in the setting of physical deconditioning  Patient not able to give any significant collateral history.  Based on his weakness and PT eval's, likely this was due to physical deconditioning.  -TTE without any valvular abnormalities, telemetry NSR, laboratory work-up negative including TSH, CK, LFTs, troponin  - tele NSR, discontinue  - PT/OT    Concern for cognitive impairment vs Acute encephalopathy (post stroke vs TBI vs other)  Concern for vulnerable adult  Patient previously living independently prior to his recent stroke.  After that he was discharged to a TCU which he left AMA from.  He does not recall the events leading to his AMA discharge.  Although he is oriented at times, significant concerns related to his decision-making ability. Unclear cause for this, likely progressive dementia with recent acute stroke worsening the situation  -OT for cognitive eval (SLUMS 6/30)  -Social work consult, they will file vulnerable adult  -Psych consult 2/19 felt he was not decisional     - Patient is not decisional. If he attempts to leave, he is holdable.     Hematuria  Noted on urinalysis for many years  -Follow-up with outpatient urology discharge    Concern for UTI  -Patient denies urinary symptoms  -Urine culture with no organisms  -Discontinue ceftriaxone    HTN  -Hold PTA amlodipine  -As needed's available    Subacute left basal ganglia stroke with possible petechial hemorrhage within the above infarct  Chronic-appearing infarct in the inferior medial right cerebellum  Multiple probable chronic microhemorrhages within both cerebral hemispheres and cerebellum  Right carotid  "stenosis  Presented on 1/14/2021 with speech/language impairment and gait disturbance.  On review of chart patient was then offered TPA, he had declined.   A1c 5.6%, LDL 93. TTE - EF normal, normal sized atria  -Restart PTA aspirin 325 mg oral daily  -outpatient f/u with neurology for carotid stenosis  - hold lipitor while obs status.     THADDEUS, pre-renal, resolved  During admission last month noted to have a creatinine of 1.41 that improved to 1.01.  Have again presents with creatinine of 1.22.  Gentle hydration with normal saline overnight, monitor creatinine in a.m.  Avoid nephrotoxic medications.     Chronic obstructive pulmonary disease history  He smoked for over 40 years, a pack a day, but quit a few years ago.  Not requiring any inhalers.  Monitor        Diet: Combination Diet Low Saturated Fat Na <2400mg Diet    DVT Prophylaxis: Pneumatic Compression Devices  Shen Catheter: not present  Code Status: Full Code           Disposition Plan   Expected discharge: 1-2 days, recommended to transitional care unit once safe disposition plan/ TCU bed available.  Entered: Tyler Henderson MD 02/19/2021, 1:43 PM       The patient's care was discussed with the Bedside Nurse, Care Coordinator/ and Patient.    >35min spent with >50% in counseling or coordination of care.    Tyler Henderson MD  Hospitalist Service  [facility]  Contact information available via [facility] Paging/Directory    ______________________________________________________________________    Interval History   Patient seen around noon.  He had been seen prior in the day prior psychiatry who felt that he was not decisional.  He stated he was not aware of such as any other doctor when I saw him.  He was aware that he had been working with therapies at could not tell me exactly what they were recommending \"I think they said I was okay to go home\".  He continues to state that he will go home and have his neighbor " "\"check on\" him.  He is not able to expound upon his safety plan as he plans to be staying alone in his own home.  Despite telling Dr. Blandon from psychiatry earlier in the day that he was okay with TCU, he stated with me that his only plan was to discharge home.    He otherwise states he is feeling well. No complaints on ROS.      Data reviewed today: I reviewed all medications, new labs and imaging results over the last 24 hours. I personally reviewed no images or EKG's today.    Physical Exam   Vital Signs: Temp: 98.1  F (36.7  C) Temp src: Axillary BP: 117/85 Pulse: 97   Resp: 18 SpO2: 95 % O2 Device: None (Room air)    Weight: 171 lbs 4.76 oz  Constitutional: Awake, alert, cooperative, no apparent cardiopulmonary distress.  Eyes: Conjunctiva and pupils examined and normal.  HEENT: Moist mucous membranes, normal dentition.  Respiratory: Clear to auscultation bilaterally, no crackles or wheezing.  Cardiovascular: Regular rate and rhythm, normal S1 and S2, and no murmur noted.  GI: Soft, non-distended, non-tender, normal bowel sounds.  Skin: No rashes, no cyanosis, no edema noted on exposed skin.  Musculoskeletal: No joint swelling, erythema or tenderness. No gross bony abnormalities  Neurologic: Cranial nerves 2-12 grossly intact, normal strength and sensation.  Psychiatric: Alert, oriented to person, place, no obvious anxiety or depression. Confused about events of prior days.  Somewhat disheveled appearance with overgrown fingernails and unkempt beard and hair.      Data   Recent Labs   Lab 02/18/21  0815 02/17/21  2219 02/17/21  1726 02/17/21  1656   WBC 8.6  --   --  19.4*   HGB 13.9  --   --  17.5   MCV 88  --   --  87     --   --  262     --  139 Canceled, Test credited   POTASSIUM 3.7  --  3.7 Canceled, Test credited   CHLORIDE 111*  --  110* Canceled, Test credited   CO2 26  --  23 Canceled, Test credited   BUN 12  --  19 Canceled, Test credited   CR 0.84  --  1.22 Canceled, Test credited "   ANIONGAP 6  --  6 Canceled, Test credited   MARCO ANTONIO 8.6  --  8.9 Canceled, Test credited   GLC 92  --  134* Canceled, Test credited   ALBUMIN 3.2*  --   --   --    PROTTOTAL 6.6*  --   --   --    BILITOTAL 0.4  --   --   --    ALKPHOS 123  --   --   --    ALT 39  --   --   --    AST 25  --   --   --    TROPI  --  <0.015  --   --      No results found for this or any previous visit (from the past 24 hour(s)).  Medications       aspirin  325 mg Oral or NG Tube Daily

## 2021-02-19 NOTE — PROGRESS NOTES
"Care Management Follow Up    Length of Stay (days): 1    Expected Discharge Date: 02/22/21     Concerns to be Addressed: discharge planning     Patient plan of care discussed at interdisciplinary rounds: Yes    Anticipated Discharge Disposition: Long Term Care     Anticipated Discharge Services:    Anticipated Discharge DME:      Patient/family educated on Medicare website which has current facility and service quality ratings:    Education Provided on the Discharge Plan:    Patient/Family in Agreement with the Plan: no    Referrals Placed by CM/SW:    Private pay costs discussed: Not applicable    Additional Information:  Spoke with Robyn at [facility]. She is assessing pt's ability to return and will discuss it with her team. However, due to pt's SLUMS score and statement by psych that pt does not have the capacity to make an informed decision, she is concerned he will not be able to sign the admission paperwork to admit back in to the facility. Also, if he were to want to leave again, they would have nothing to hold him there. SW met with pt. He is not giving any indication at this time that he intends to return to TCU. He realizes he left on \"bad terms.\" Pt believes he can go live with his neighbor, Barron Hodge, [phone], [phone]. Left voicemail for him to call. Pt reports he has no family and never given anyone \"POA.\" ARASH filled a [facility] or [facility] report #[id number].       JOSUE Linda, LGSW  [phone]  [facility]        "

## 2021-02-19 NOTE — CONSULTS
"[facility]    PSYCHIATRIC CONSULTATION     Date of Admission:  2/17/2021  Requesting Provider: Maira Elizabeth MD    HPI:  Jose Vazquez is a 75 year old male with a recent history of stroke (1/14/21) that was discharged to TCU though left AMA as he had indicated to the hospitalist on this admission that he was feeling better and felt he was \"being lied to.\" While walking home patient had a slip and fall on the sidewalk and bystanders found him on the ground and brought him to the ED. In review of provider documentation, the patient was reportedly living independently prior to his stroke, though at present there is prominent concern for his ability to manage himself going forward, both from a cognitive as well as physical perspective. PT has evaluated the patient and determined he's not safe to return to independent living at this time and is recommending TCU. OT cog eval demonstrated cognitive impairment with a markedly low SLUMS of 6/30 with observations of very limited insight into said deficits.. Patient however has been refusing TCU, instead wanting to return home to independent living. SW is pursuing classification as a vulnerable adult and are filing report to Adult Protection. Our service was consulted for questions of capacity in this regard given TCU refusal.    On my interview the patient is aware of his environment and the circumstances informing his admission. \"I'm at [facility] because I fell.\" He recalls his desire to discharge from the TCU and concedes to falling shortly thereafter. He affirms he was living independently prior to his stroke though also notes he has no family nor support system save for one friend he \"meets with.\" I inquired about the recommendations for next stages of care: \"they're not recommending anything for me other than going home.\" I did inform him of the TCU recs and he then agrees \"but I have my vehicle at home sitting there and I need to " "take care of that.\" I explained to him the rationale for his TCU needs. He tells me it resonates with him and he'd be agreeable \"if I could go home and check on my stuff.\" He continues \"I have a brand new car, a house, and things in the house.\"He denies having any animals \"thank God.\" He tells me he has a car parked in the driveway and wants to make sure that it's secure. I inquired about maybe having the police check in on it for him and he tells me they've done so, but that he needs to ensure that for himself. He was unable to expand upon the details of the safety concerns broached by PT/OT nor convey an appreciation for the risks of not adhering to their care recommendations. \"I am open to being able to go home and check on my things and be left there or...\" I asked \"or what\" and he follows: \"I don't know.\" He was very pleasant with me and seems to recognize to some degree he needs help and concluded that he would be supportive of TCU with the caveat that someone take him to his home first to assuage his concerns about the safety of his property and possessions.         PAST MEDICAL HISTORY:  Past Medical History:   Diagnosis Date     Bladder stone      BPH (benign prostatic hyperplasia)      Right acetabular fracture (H)        FAMILY HISTORY:  Family History   Problem Relation Age of Onset     Family History Negative Mother      Family History Negative Father        SOCIAL HISTORY:  Social History     Socioeconomic History     Marital status: Single     Spouse name: Not on file     Number of children: 0     Years of education: Not on file     Highest education level: Not on file   Occupational History     Occupation:    Social Needs     Financial resource strain: Not on file     Food insecurity     Worry: Not on file     Inability: Not on file     Transportation needs     Medical: Not on file     Non-medical: Not on file   Tobacco Use     Smoking status: Former Smoker     Packs/day: 1.00     Years: 42.00 "     Pack years: 42.00     Types: Cigarettes     Start date: 2016     Smokeless tobacco: Never Used   Substance and Sexual Activity     Alcohol use: Yes     Alcohol/week: 0.0 standard drinks     Comment: rarely     Drug use: No     Sexual activity: Yes     Partners: Female   Lifestyle     Physical activity     Days per week: Not on file     Minutes per session: Not on file     Stress: Not on file   Relationships     Social connections     Talks on phone: Not on file     Gets together: Not on file     Attends Yazidism service: Not on file     Active member of club or organization: Not on file     Attends meetings of clubs or organizations: Not on file     Relationship status: Not on file     Intimate partner violence     Fear of current or ex partner: Not on file     Emotionally abused: Not on file     Physically abused: Not on file     Forced sexual activity: Not on file   Other Topics Concern      Service Not Asked     Blood Transfusions Not Asked     Caffeine Concern Not Asked     Occupational Exposure Not Asked     Hobby Hazards Not Asked     Sleep Concern Not Asked     Stress Concern Not Asked     Weight Concern Not Asked     Special Diet Not Asked     Back Care Not Asked     Exercise Not Asked     Bike Helmet Not Asked     Seat Belt Not Asked     Self-Exams Not Asked   Social History Narrative     Not on file       REVIEW OF SYSTEMS:  10 point review of systems completed and negative else reported in the HPI.    ALLERGIES:  No Known Allergies    PRIOR TO ADMISSION MEDICATIONS:  Prior to Admission Medications   Prescriptions Last Dose Informant Patient Reported? Taking?   albuterol (PROAIR HFA/PROVENTIL HFA/VENTOLIN HFA) 108 (90 Base) MCG/ACT inhaler   No No   Sig: Inhale 2 puffs into the lungs every 4 hours as needed for wheezing or shortness of breath / dyspnea   amLODIPine (NORVASC) 10 MG tablet   No No   Sig: Take 1 tablet (10 mg) by mouth daily   aspirin (ASA) 325 MG tablet   No No   Si  tablet (325 mg) by Oral or NG Tube route daily   atorvastatin (LIPITOR) 40 MG tablet   No No   Sig: Take 1 tablet (40 mg) by mouth every evening      Facility-Administered Medications: None       LABS/VITALS:  No results found for this or any previous visit (from the past 24 hour(s)).  B/P: 118/79, T: 97.9, P: 85, R: 18    MENTAL STATUS EXAM:  Appearance:  awake, alert  Eye Contact:  good  Speech:  clear, coherent  Language: normal  Psychomotor Behavior:  no evidence of tardive dyskinesia, dystonia, or tics  Mood:  good  Affect:  appropriate and in normal range  Thought Process:  logical, linear and goal oriented no loose associations though some paucity appreciated  Thought Content:  no evidence of suicidal ideation; no evidence of homicidal ideation; no observation of response to internal stimuli  Oriented to:  person and circumstance  Attention Span and Concentration: fair  Recent and Remote Memory:  fair  Fund of Knowledge: appropriate  Insight:  limited  Judgment:  limited      DIAGNOSES:  1. Incapacity  2. s/p CVA    RECOMMENDATIONS:  Benigno does not have the capacity to make an informed decision with respect to returning home against the guidance and recommendations of his treatment team. He demonstrates objective deficits in both cognition as well as ability to care for himself physically and attend to ADLs without support. Given that he has no family, would pursue establishing a surrogate decision maker with the assistance of [facility].    I did promise him I'd advocate for the potential of having the service that transports him to [facility] bring him to his home first so he could be reassured that his belongings are safe. I don't know if this is at all feasible, but wanted to relay this to his care team for their consideration.      Attestation:  Patient has been seen and evaluated by me,  Chiki Blandon DO    Visit/Communication Style   VIRTUAL (VIDEO) communication was used to evaluate Jose due to the COVID  ortiz Garcia consented to the use of video communication: yes  Video START time: 9:55 am, 2/19/2021  Video STOP time: 10:06 am, 2/19/2021   Patient's location: [facility]   Provider's location during the visit: Home

## 2021-02-19 NOTE — PLAN OF CARE
DATE & TIME: 02/18/21; 1900-0730  Cognitive Concerns/ Orientation: A&Ox3; disoriented to time, but at times oriented to self only. Fluctuates.   BEHAVIOR & AGGRESSION TOOL COLOR: Green   CIWA SCORE: N/A  ABNL VS/O2: VSS; on RA, remains afebrile.   MOBILITY: SBA, impulsive and quick on his feet.   PAIN MANAGMENT: Denies   DIET: low fat, Na < 2400 mg; tolerating   BOWEL/BLADDER: incontinent at times   ABNL LAB/BG: UA abnormal DRAIN/DEVICES: PIV SL.   TELEMETRY RHYTHM: SR  SKIN: dry/flaky; bruising/ scabs   TESTS/PROCEDURES:   D/C DAY/GOALS/PLACE: needs safe place for discharge; otherwise, medically stable. PT recommending TCU. SW following; OT consult (for cognitive eval) pending.  OTHER IMPORTANT INFO: Diminished lung sounds. Slept most of shift.

## 2021-02-19 NOTE — PLAN OF CARE
Summary: Pt was staying at a TCU after recent CVA; Pt left TCU AMA and bystanders found pt down on the ground.   DATE & TIME: 02/18/21; 5245-5234  Cognitive Concerns/ Orientation : This AM, A&Ox3; disoriented to time, but at times oriented to self only.   BEHAVIOR & AGGRESSION TOOL COLOR: Green   CIWA SCORE: N/A  ABNL VS/O2: VSS; on RA  MOBILITY: SBA  PAIN MANAGMENT: Denies   DIET: low fat, Na < 2400 mg; tolerating   BOWEL/BLADDER: incontinent at times   ABNL LAB/BG: UA abnormal DRAIN/DEVICES: PIV SL.   TELEMETRY RHYTHM: SR  SKIN: dry/flaky; bruising/ scabs   TESTS/PROCEDURES:   D/C DAY/GOALS/PLACE: needs safe place for discharge; otherwise, medically stable. PT recommending TCU. SW following; OT consult (for cognitive eval) pending.  OTHER IMPORTANT INFO: Neuro check q 4 hours.

## 2021-02-20 PROCEDURE — 120N000001 HC R&B MED SURG/OB

## 2021-02-20 PROCEDURE — 99232 SBSQ HOSP IP/OBS MODERATE 35: CPT | Performed by: STUDENT IN AN ORGANIZED HEALTH CARE EDUCATION/TRAINING PROGRAM

## 2021-02-20 PROCEDURE — 250N000013 HC RX MED GY IP 250 OP 250 PS 637: Performed by: HOSPITALIST

## 2021-02-20 RX ADMIN — ASPIRIN 325 MG ORAL TABLET 325 MG: 325 PILL ORAL at 08:01

## 2021-02-20 ASSESSMENT — ACTIVITIES OF DAILY LIVING (ADL)
ADLS_ACUITY_SCORE: 15

## 2021-02-20 NOTE — PLAN OF CARE
DATE & TIME: 2/19/21 1900-2300    Cognitive Concerns/ Orientation : Pt A/Ox3, disoriented to time. Forgetful.   BEHAVIOR & AGGRESSION TOOL COLOR: Green   ABNL VS/O2: VSS on RA  MOBILITY: SBA, fall risk  PAIN MANAGMENT: Denies  DIET: Cardiac  BOWEL/BLADDER: Incontinent at times  DRAIN/DEVICES: IV SL  SKIN: Dry/flaky; bruised/scabs  D/C DAY/GOALS/PLACE: Pt needs a safe discharge plan, medically stable. SW following for placement.  OTHER IMPORTANT INFO: Per psych pt does not have decision making capacity. Per hospitalist pt is holdable if attempts to leave AMA. Pt cooperative this evening.

## 2021-02-20 NOTE — PLAN OF CARE
DATE & TIME: 02/19/21 9327-1099  Cognitive Concerns/ Orientation: A&Ox3; disoriented to time, forgetful   BEHAVIOR & AGGRESSION TOOL COLOR: Green   ABNL VS/O2: VSS on RA  MOBILITY: SBA, impulsive and quick on his feet.   PAIN MANAGMENT: Denies pain   DIET: low fat, Na < 2400 mg; tolerating well with good appetite  BOWEL/BLADDER: incontinent at times   ABNL LAB/BG: no labs today  DRAIN/DEVICES: PIV SL   TELEMETRY RHYTHM: NSR - Tele discontinued  SKIN: dry/flaky; bruising/ scabs   TESTS/PROCEDURES: none  D/C DAY/GOALS/PLACE: needs safe discharge plan; otherwise, medically stable. PT recommending TCU.   OTHER IMPORTANT INFO: SW following for discharge plan. OT seen for cognitive eval. Psych states pt does not have decision making capacity; per hospitalist, is holdable if attempts to leave AMA.

## 2021-02-20 NOTE — PLAN OF CARE
DATE & TIME: 2/19/21 1016-0161    Cognitive Concerns/ Orientation : Pt A/Ox3, disoriented to time. Forgetful.   BEHAVIOR & AGGRESSION TOOL COLOR: Green   ABNL VS/O2: VSS on RA  MOBILITY: SBA, fall risk  PAIN MANAGMENT: Denies  DIET: Cardiac  BOWEL/BLADDER: Incontinent at times  DRAIN/DEVICES: PIV SL  SKIN: Dry/flaky; bruised/scabs  D/C DAY/GOALS/PLACE: Pt needs a safe discharge plan, medically stable. SW following for placement.  OTHER IMPORTANT INFO: Per psych pt does not have decision making capacity. Per hospitalist pt is holdable if attempts to leave AMA. Pt cooperative this shift.

## 2021-02-20 NOTE — PROGRESS NOTES
[facility]    Medicine Progress Note - Hospitalist Service       Date of Admission:  2/17/2021  Assessment & Plan       Jose Vazquez is a 75-year-old male with a medical history of recent stroke who presented to the emergency department on 2/17/2021 after he was found in the community down after a fall.    Fall, likely in the setting of physical deconditioning  Patient not able to give any significant collateral history.  Based on his weakness and PT eval's, likely this was due to physical deconditioning.  -TTE without any valvular abnormalities, telemetry NSR, laboratory work-up negative including TSH, CK, LFTs, troponin  - tele NSR, discontinue  - PT/OT    Concern for cognitive impairment vs Acute encephalopathy (post stroke vs TBI vs other)  Concern for vulnerable adult  Patient previously living independently prior to his recent stroke.  After that he was discharged to a TCU which he left AMA from.  He does not recall the events leading to his AMA discharge.  Although he is oriented at times, significant concerns related to his decision-making ability. Unclear cause for this, likely progressive dementia with recent acute stroke worsening the situation.   This is a difficult situation with patient deemed not decisional by psych and no next of kin or other surrogate decision makers. Patient states he has a neighbor who helps him, but is clear he has never had a medical power of .  -OT for cognitive eval (SLUMS 6/30)  -Social work consult, they will file vulnerable adult  -Psych consult 2/19 felt he was not decisional     - Patient is not decisional. If he attempts to leave, he is holdable.     Hematuria  Noted on urinalysis for many years  -Follow-up with outpatient urology discharge    Concern for UTI  -Patient denies urinary symptoms  -Urine culture with no organisms  -Discontinue ceftriaxone    HTN  -Hold PTA amlodipine  -As needed's available    Subacute left basal ganglia  stroke with possible petechial hemorrhage within the above infarct  Chronic-appearing infarct in the inferior medial right cerebellum  Multiple probable chronic microhemorrhages within both cerebral hemispheres and cerebellum  Right carotid stenosis  Presented on 1/14/2021 with speech/language impairment and gait disturbance.  On review of chart patient was then offered TPA, he had declined.   A1c 5.6%, LDL 93. TTE - EF normal, normal sized atria  -Restart PTA aspirin 325 mg oral daily  -outpatient f/u with neurology for carotid stenosis  - hold lipitor while obs status.     THADDEUS, pre-renal, resolved  During admission last month noted to have a creatinine of 1.41 that improved to 1.01.  Have again presents with creatinine of 1.22.  Gentle hydration with normal saline overnight, monitor creatinine in a.m.  Avoid nephrotoxic medications.     Chronic obstructive pulmonary disease history  He smoked for over 40 years, a pack a day, but quit a few years ago.  Not requiring any inhalers.  Monitor        Diet: Combination Diet Low Saturated Fat Na <2400mg Diet    DVT Prophylaxis: Pneumatic Compression Devices  Shen Catheter: not present  Code Status: Full Code           Disposition Plan   Expected discharge: 1-2 days, recommended to transitional care unit once safe disposition plan/ TCU bed available.  Entered: Tyler Henderson MD 02/20/2021, 2:16 PM       The patient's care was discussed with the Bedside Nurse, Care Coordinator/ and Patient.    >25min spent with >50% in counseling or coordination of care.    Tyler Henderson MD  Hospitalist Service  [facility]  Contact information available via [facility] Paging/Directory    ______________________________________________________________________    Interval History   Patient seen around noon.  Eating lunch. States he is hoping to get out of the hospital, ideally to home. He his not urgently going to do this though. RN has been working  with him to get some walks in.     SW discussed with TCU yesterday, they are concerned that he is unable to sign his own admission papers due to his mental status.    He otherwise states he is feeling well. No complaints on ROS.      Data reviewed today: I reviewed all medications, new labs and imaging results over the last 24 hours. I personally reviewed no images or EKG's today.    Physical Exam   Vital Signs: Temp: 97.7  F (36.5  C) Temp src: Oral BP: (!) 130/91 Pulse: 88   Resp: 18 SpO2: 96 % O2 Device: None (Room air)    Weight: 171 lbs 4.76 oz  Constitutional: Awake, alert, cooperative, no apparent cardiopulmonary distress.  Eyes: Conjunctiva and pupils examined and normal.  HEENT: Moist mucous membranes, normal dentition.  Respiratory: Clear to auscultation bilaterally, no crackles or wheezing.  Cardiovascular: Regular rate and rhythm, normal S1 and S2, and no murmur noted.  GI: Soft, non-distended, non-tender, normal bowel sounds.  Skin: No rashes, no cyanosis, no edema noted on exposed skin.  Musculoskeletal: No joint swelling, erythema or tenderness. No gross bony abnormalities  Neurologic: Cranial nerves 2-12 grossly intact, normal strength and sensation.  Psychiatric: Alert, oriented to person, place, no obvious anxiety or depression. Confused about events of prior days.  Somewhat disheveled appearance with overgrown fingernails and unkempt beard and hair.      Data   Recent Labs   Lab 02/18/21  0815 02/17/21  2219 02/17/21  1726 02/17/21  1656   WBC 8.6  --   --  19.4*   HGB 13.9  --   --  17.5   MCV 88  --   --  87     --   --  262     --  139 Canceled, Test credited   POTASSIUM 3.7  --  3.7 Canceled, Test credited   CHLORIDE 111*  --  110* Canceled, Test credited   CO2 26  --  23 Canceled, Test credited   BUN 12  --  19 Canceled, Test credited   CR 0.84  --  1.22 Canceled, Test credited   ANIONGAP 6  --  6 Canceled, Test credited   MARCO ANTONIO 8.6  --  8.9 Canceled, Test credited   GLC 92  --   134* Canceled, Test credited   ALBUMIN 3.2*  --   --   --    PROTTOTAL 6.6*  --   --   --    BILITOTAL 0.4  --   --   --    ALKPHOS 123  --   --   --    ALT 39  --   --   --    AST 25  --   --   --    TROPI  --  <0.015  --   --      No results found for this or any previous visit (from the past 24 hour(s)).  Medications       aspirin  325 mg Oral or NG Tube Daily

## 2021-02-21 ENCOUNTER — APPOINTMENT (OUTPATIENT)
Dept: PHYSICAL THERAPY | Facility: CLINIC | Age: 76
DRG: 070 | End: 2021-02-21
Payer: COMMERCIAL

## 2021-02-21 PROCEDURE — 120N000001 HC R&B MED SURG/OB

## 2021-02-21 PROCEDURE — 250N000013 HC RX MED GY IP 250 OP 250 PS 637: Performed by: HOSPITALIST

## 2021-02-21 PROCEDURE — 97116 GAIT TRAINING THERAPY: CPT | Mod: GP

## 2021-02-21 PROCEDURE — 99233 SBSQ HOSP IP/OBS HIGH 50: CPT | Performed by: STUDENT IN AN ORGANIZED HEALTH CARE EDUCATION/TRAINING PROGRAM

## 2021-02-21 RX ADMIN — ASPIRIN 325 MG ORAL TABLET 325 MG: 325 PILL ORAL at 07:44

## 2021-02-21 ASSESSMENT — ACTIVITIES OF DAILY LIVING (ADL)
ADLS_ACUITY_SCORE: 15

## 2021-02-21 NOTE — PLAN OF CARE
DATE & TIME: 2/20/21 1900-2300    Cognitive Concerns/ Orientation : Pt A/Ox3, disoriented to time. Forgetful.   BEHAVIOR & AGGRESSION TOOL COLOR: Green   ABNL VS/O2: VSS on RA  MOBILITY: SBA, fall risk  PAIN MANAGMENT: Denies  DIET: Cardiac  BOWEL/BLADDER: Incontinent at times  DRAIN/DEVICES: IV SL  SKIN: Dry/flaky; bruised/scabs  D/C DAY/GOALS/PLACE: Awaiting placement/safe discharge plan  OTHER IMPORTANT INFO: Neuros intact. Holdable if attempts to leave AMA. Per psych does not have decision making capacity. Pt calm and cooperative this evening. Neuros intact.

## 2021-02-21 NOTE — PROGRESS NOTES
[facility]    Medicine Progress Note - Hospitalist Service       Date of Admission:  2/17/2021  Assessment & Plan       Jose Vazquez is a 75-year-old male with a medical history of recent stroke who presented to the emergency department on 2/17/2021 after he was found in the community down after a fall.    Fall, likely in the setting of physical deconditioning  Patient not able to give any significant collateral history.  Based on his weakness and PT eval's, likely this was due to physical deconditioning.  -TTE without any valvular abnormalities, telemetry NSR, laboratory work-up negative including TSH, CK, LFTs, troponin  - tele NSR, discontinue  - PT/OT, recommending TCU    Concern for cognitive impairment vs Acute encephalopathy (post stroke vs TBI vs other)  Concern for vulnerable adult  Patient previously living independently prior to his recent stroke.  After that he was discharged to a TCU which he left AMA from.  He does not recall the events leading to his AMA discharge.  Although he is oriented at times, significant concerns related to his decision-making ability. Unclear cause for this, likely progressive dementia with recent acute stroke worsening the situation.   This is a difficult situation with patient deemed not decisional by psych and no next of kin or other surrogate decision makers. Patient states he has a neighbor who helps him, but is clear he has never had a medical power of . If he needed a surrogate decision maker, he would like it to be his neighbor.  -OT for cognitive eval (SLUMS 6/30)  -Social work consult, they will file vulnerable adult  -Psych consult 2/19 felt he was not decisional     - Patient is not decisional. If he attempts to leave, he is holdable.     Hematuria  Noted on urinalysis for many years  -Follow-up with outpatient urology discharge    Concern for UTI  -Patient denies urinary symptoms  -Urine culture with no organisms  -Discontinue  ceftriaxone    HTN  -Hold PTA amlodipine  -As needed's available    Subacute left basal ganglia stroke with possible petechial hemorrhage within the above infarct  Chronic-appearing infarct in the inferior medial right cerebellum  Multiple probable chronic microhemorrhages within both cerebral hemispheres and cerebellum  Right carotid stenosis  Presented on 1/14/2021 with speech/language impairment and gait disturbance.  On review of chart patient was then offered TPA, he had declined.   A1c 5.6%, LDL 93. TTE - EF normal, normal sized atria  -Restart PTA aspirin 325 mg oral daily  -outpatient f/u with neurology for carotid stenosis  - hold lipitor while obs status.     THADDEUS, pre-renal, resolved  During admission last month noted to have a creatinine of 1.41 that improved to 1.01.  Have again presents with creatinine of 1.22.  Avoid nephrotoxic medications.     Chronic obstructive pulmonary disease history  He smoked for over 40 years, a pack a day, but quit a few years ago.  Not requiring any inhalers.  Monitor        Diet: Combination Diet Low Saturated Fat Na <2400mg Diet    DVT Prophylaxis: Pneumatic Compression Devices  Shen Catheter: not present  Code Status: Full Code           Disposition Plan   Expected discharge: 1-2 days, recommended to transitional care unit once safe disposition plan/ TCU bed available.  Entered: Tyler Henderson MD 02/21/2021, 2:55 PM       The patient's care was discussed with the Bedside Nurse, Care Coordinator/ and Patient.    >35min spent with >50% in counseling or coordination of care. Discussed need for safe placement, risks of discharge home.    Tyler Henderson MD  Hospitalist Service  [facility]  Contact information available via [facility] Paging/Directory    ______________________________________________________________________    Interval History   Patient seen after noon.  Again today he is adamant that he would like to discharge home.   "When asked reasoning for why he would like to discharge home rather than to TCU, he states \"because that is where I want to be.\"  He is unable to expound on the risks and benefits of discharging home versus TCU.  States he does not think he will fall again, despite the contrary.    ROS negative.      Data reviewed today: I reviewed all medications, new labs and imaging results over the last 24 hours. I personally reviewed no images or EKG's today.    Physical Exam   Vital Signs: Temp: 97.6  F (36.4  C) Temp src: Oral BP: (!) 123/92 Pulse: 90   Resp: 16 SpO2: 93 % O2 Device: None (Room air)    Weight: 171 lbs 4.76 oz  Constitutional: Awake, alert, cooperative, no apparent cardiopulmonary distress.  Eyes: Conjunctiva and pupils examined and normal.  HEENT: Moist mucous membranes, normal dentition.  Respiratory: Clear to auscultation bilaterally, no crackles or wheezing.  Cardiovascular: Regular rate and rhythm, normal S1 and S2, and no murmur noted.  GI: Soft, non-distended, non-tender, normal bowel sounds.  Skin: No rashes, no cyanosis, no edema noted on exposed skin.  Musculoskeletal: No joint swelling, erythema or tenderness. No gross bony abnormalities  Neurologic: Cranial nerves 2-12 grossly intact, normal strength and sensation.  Psychiatric: Alert, oriented to person, place, no obvious anxiety or depression. Confused about events of prior days.  Somewhat disheveled appearance with overgrown fingernails and unkempt beard and hair.      Data   Recent Labs   Lab 02/18/21  0815 02/17/21  2219 02/17/21  1726 02/17/21  1656   WBC 8.6  --   --  19.4*   HGB 13.9  --   --  17.5   MCV 88  --   --  87     --   --  262     --  139 Canceled, Test credited   POTASSIUM 3.7  --  3.7 Canceled, Test credited   CHLORIDE 111*  --  110* Canceled, Test credited   CO2 26  --  23 Canceled, Test credited   BUN 12  --  19 Canceled, Test credited   CR 0.84  --  1.22 Canceled, Test credited   ANIONGAP 6  --  6 Canceled, Test " credited   MARCO ANTONIO 8.6  --  8.9 Canceled, Test credited   GLC 92  --  134* Canceled, Test credited   ALBUMIN 3.2*  --   --   --    PROTTOTAL 6.6*  --   --   --    BILITOTAL 0.4  --   --   --    ALKPHOS 123  --   --   --    ALT 39  --   --   --    AST 25  --   --   --    TROPI  --  <0.015  --   --      No results found for this or any previous visit (from the past 24 hour(s)).  Medications       aspirin  325 mg Oral or NG Tube Daily

## 2021-02-21 NOTE — CONSULTS
BRIEF NUTRITION NOTE:    Received Nutrition Screen consult:  Unsure weight loss.    Pt has lost 1.3 kg (2%) weight over the past 1 month which is not significant.    Wt Readings from Last 10 Encounters:   02/17/21 77.7 kg (171 lb 4.8 oz)   01/19/21 79 kg (174 lb 3.2 oz)   12/19/19 84.8 kg (187 lb)   03/13/17 89.4 kg (197 lb)   04/05/16 84.3 kg (185 lb 12.8 oz)   03/02/16 81.6 kg (179 lb 12.8 oz)   02/02/16 77.7 kg (171 lb 4.8 oz)   01/19/16 83.3 kg (183 lb 11.2 oz)   01/12/16 83.3 kg (183 lb 11.2 oz)   12/29/14 81.4 kg (179 lb 6.4 oz)     Per nursing note and flow sheets he has a good appetite and is eating 100% meals.    Will defer assessment to 7 day length of stay.    Jyotsna Goff, RD, LD, CNSC

## 2021-02-21 NOTE — PLAN OF CARE
DATE & TIME: 2/20/21 9092-3201   Cognitive Concerns/ Orientation : Pt A/Ox3, disoriented to time. Forgetful.   BEHAVIOR & AGGRESSION TOOL COLOR: Green   ABNL VS/O2: VSS on RA  MOBILITY: SBA, fall risk  PAIN MANAGMENT: Denies  DIET: Cardiac  BOWEL/BLADDER: Incontinent at times  DRAIN/DEVICES: IV SL  SKIN: Dry/flaky; bruised/scabs  D/C DAY/GOALS/PLACE: Awaiting placement/safe discharge plan  OTHER IMPORTANT INFO: Neuros intact. Holdable if attempts to leave AMA. Per psych does not have decision making capacity. Pt calm and cooperative this shift and slept most of the shift. Neuros intact. Walks to the BR, steady gait, impulsive at times, bed alarm on.

## 2021-02-21 NOTE — PLAN OF CARE
DATE & TIME: 2/20/21 3393-0880   Cognitive Concerns/ Orientation : Pt A/Ox3, disoriented to time. Forgetful.   BEHAVIOR & AGGRESSION TOOL COLOR: Green  ABNL VS/O2: VSS on RA  MOBILITY: SBA, fall risk; ambulated in [place]  PAIN MANAGMENT: Denies  DIET: Cardiac diet, good appetite  BOWEL/BLADDER: Incontinent at times  DRAIN/DEVICES: PIV SL  SKIN: Dry/flaky; bruised/scabs  D/C DAY/GOALS/PLACE: Awaiting safe discharge plan, medically stable. SW following for placement.  OTHER IMPORTANT INFO: Per psych - pt does not have decision making capacity. Per hospitalist pt is holdable if attempts to leave AMA. Pt cooperative this shift.

## 2021-02-22 PROCEDURE — 250N000013 HC RX MED GY IP 250 OP 250 PS 637: Performed by: STUDENT IN AN ORGANIZED HEALTH CARE EDUCATION/TRAINING PROGRAM

## 2021-02-22 PROCEDURE — 120N000001 HC R&B MED SURG/OB

## 2021-02-22 PROCEDURE — 250N000013 HC RX MED GY IP 250 OP 250 PS 637: Performed by: HOSPITALIST

## 2021-02-22 PROCEDURE — 99233 SBSQ HOSP IP/OBS HIGH 50: CPT | Performed by: STUDENT IN AN ORGANIZED HEALTH CARE EDUCATION/TRAINING PROGRAM

## 2021-02-22 RX ORDER — ATORVASTATIN CALCIUM 40 MG/1
40 TABLET, FILM COATED ORAL EVERY EVENING
Status: DISCONTINUED | OUTPATIENT
Start: 2021-02-22 | End: 2021-03-17 | Stop reason: HOSPADM

## 2021-02-22 RX ADMIN — ATORVASTATIN CALCIUM 40 MG: 40 TABLET, FILM COATED ORAL at 19:02

## 2021-02-22 RX ADMIN — ASPIRIN 325 MG ORAL TABLET 325 MG: 325 PILL ORAL at 07:58

## 2021-02-22 ASSESSMENT — ACTIVITIES OF DAILY LIVING (ADL)
ADLS_ACUITY_SCORE: 15
ADLS_ACUITY_SCORE: 15
ADLS_ACUITY_SCORE: 14
ADLS_ACUITY_SCORE: 15
ADLS_ACUITY_SCORE: 14
ADLS_ACUITY_SCORE: 15

## 2021-02-22 NOTE — PLAN OF CARE
DATE & TIME: 2/22/21 7-3pm  Cognitive Concerns/ Orientation : Pt A/Ox3, disoriented to time. Forgetful.   BEHAVIOR & AGGRESSION TOOL COLOR: Green   ABNL VS/O2: VSS on RA  MOBILITY: SBA, fall risk, walked in hallway X1  PAIN MANAGMENT: Denies  DIET: Cardiac  BOWEL/BLADDER: Incontinent at times  DRAIN/DEVICES: IV SL  SKIN: Bruised/scabs  D/C DAY/GOALS/PLACE: Awaiting placement/safe discharge plan  OTHER IMPORTANT INFO: Holdable if attempts to leave AMA. Per psych does not have decisional capacity.

## 2021-02-22 NOTE — PLAN OF CARE
DATE & TIME: 2/21/21 0700-1930   Cognitive Concerns/ Orientation : Pt A/Ox3, disoriented to time. Forgetful.   BEHAVIOR & AGGRESSION TOOL COLOR: Green, calm & cooperative this shift, although frequently asks to go home   ABNL VS/O2: VSS on RA  MOBILITY: SBA, fall risk; refused to ambulate in hallway  PAIN MANAGMENT: Denies   DIET: Cardiac diet, good appetite  BOWEL/BLADDER: Incontinent at times  DRAIN/DEVICES: IV SL  SKIN: Dry/flaky; bruised/scabs  D/C DAY/GOALS/PLACE: Awaiting placement/safe discharge plan  OTHER IMPORTANT INFO: Holdable if attempts to leave AMA; Per psych does not have decision making capacity.

## 2021-02-22 NOTE — PLAN OF CARE
"PT - Attempted to see pt for balance and gait training, pt adamantly refused, stating \"I want to leave and you people won't let me leave. The doc has been lying to me.\" Unable to convince pt to participate. Will reschedule for tomorrow.  "

## 2021-02-22 NOTE — PROGRESS NOTES
[facility]    Medicine Progress Note - Hospitalist Service       Date of Admission:  2/17/2021  Assessment & Plan       Jose Vazquez is a 75-year-old male with a medical history of recent stroke who presented to the emergency department on 2/17/2021 after he was found in the community down after a fall.    Fall, likely in the setting of physical deconditioning  Patient not able to give any significant collateral history.  Based on his weakness and PT eval's, likely this was due to physical deconditioning.  -TTE without any valvular abnormalities, telemetry NSR, laboratory work-up negative including TSH, CK, LFTs, troponin  - tele NSR, discontinue  - PT/OT, recommending TCU    Concern for cognitive impairment vs Acute encephalopathy (post stroke vs TBI vs other)  Concern for vulnerable adult  Patient previously living independently prior to his recent stroke.  After that he was discharged to a TCU which he left AMA from.  He does not recall the events leading to his AMA discharge.  Although he is oriented at times, significant concerns related to his decision-making ability. Unclear cause for this, likely progressive dementia with recent acute stroke worsening the situation.   This is a difficult situation with patient deemed not decisional by psych and no next of kin or other surrogate decision makers. Patient states he has a neighbor who helps him, but is clear he has never had a medical power of . If he needed a surrogate decision maker, he would like it to be his neighbor.  -OT for cognitive eval (SLUMS 6/30)  -Social work consult, they will file vulnerable adult  -Psych consult 2/19 felt he was not decisional   -Re-consult psych to assist with level of decision making ability. Patient remains adamant he would like to discharge home, but can't safely weigh risk benefit of this. Question if he has the understanding to appoint his neighbor as POA/decision maker. As he has no next of  kin, concern may have to pursue commitment.    - Patient is not decisional. If he attempts to leave, he is holdable.     Hematuria  Noted on urinalysis for many years  -Follow-up with outpatient urology discharge    Concern for UTI  -Patient denies urinary symptoms  -Urine culture with no organisms  -Discontinue ceftriaxone    HTN  -Hold PTA amlodipine  -As needed's available    Subacute left basal ganglia stroke with possible petechial hemorrhage within the above infarct  Chronic-appearing infarct in the inferior medial right cerebellum  Multiple probable chronic microhemorrhages within both cerebral hemispheres and cerebellum  Right carotid stenosis  Presented on 1/14/2021 with speech/language impairment and gait disturbance.  On review of chart patient was then offered TPA, he had declined.   A1c 5.6%, LDL 93. TTE - EF normal, normal sized atria  -Restart PTA aspirin 325 mg oral daily  -outpatient f/u with neurology for carotid stenosis  - resume statin     THADDEUS, pre-renal, resolved  During admission last month noted to have a creatinine of 1.41 that improved to 1.01.  Have again presents with creatinine of 1.22.  Avoid nephrotoxic medications.     Chronic obstructive pulmonary disease history  He smoked for over 40 years, a pack a day, but quit a few years ago.  Not requiring any inhalers.  Monitor        Diet: Combination Diet Low Saturated Fat Na <2400mg Diet    DVT Prophylaxis: Pneumatic Compression Devices  Shen Catheter: not present  Code Status: Full Code           Disposition Plan   Expected discharge: TBD, recommended to transitional care unit once safe disposition plan/ TCU bed available.  Entered: Tyler Henderson MD 02/22/2021, 3:03 PM       The patient's care was discussed with the Bedside Nurse, Care Coordinator/ and Patient.    >25min spent with >50% in counseling or coordination of care. Discussed need for safe placement, risks of discharge home.    Addendum: spent an additional  20min on the phone coordinating with neighbor about ongoing care, unable to complete this coordination through discussion with patient due to AMS/dementia. Total of 45min spent.    Tyler Henderson MD  Hospitalist Service  [facility]  Contact information available via [facility] Paging/Directory    ______________________________________________________________________    Interval History   Patient seen after noon.  Continues to feel like he is being lied to. Initially does not want to discuss need for TCU. Doesn't think he will fall again, declines to work with PT/OT.   ROS negative.    Addendum: Spoke with neighbor Trent Escobar at [phone]. He is the son of neighbor Barron. Jose usually goes over to Barron's house daily to chat and will often spend most of the day there. Trent visits his dad every day and is primary caretaker for him. Trent wonders if Jose could ever come to live with his dad in the future, but would like him to complete a stay at TCU first. Trent did not fully commit to this plan, but seemed open to it. Trent will call Jose on 2/23 to encourage him to work with therapies and accept TCU placement. Discussed with care coordinator. If psych believes him decisional, may be able to appoint Trent as POA/decision maker for major decisions and then send to TCU.      Data reviewed today: I reviewed all medications, new labs and imaging results over the last 24 hours. I personally reviewed no images or EKG's today.    Physical Exam   Vital Signs: Temp: 97.5  F (36.4  C) Temp src: Oral BP: 126/86 Pulse: 87   Resp: 18 SpO2: 94 % O2 Device: None (Room air)    Weight: 171 lbs 4.76 oz  Constitutional: Awake, alert, cooperative, no apparent cardiopulmonary distress.  Eyes: Conjunctiva and pupils examined and normal.  HEENT: Moist mucous membranes, normal dentition.  Respiratory: Clear to auscultation bilaterally, no crackles or wheezing.  Cardiovascular: Regular rate and rhythm, normal  S1 and S2, and no murmur noted.  GI: Soft, non-distended, non-tender, normal bowel sounds.  Skin: No rashes, no cyanosis, no edema noted on exposed skin.  Musculoskeletal: No joint swelling, erythema or tenderness. No gross bony abnormalities  Neurologic: Cranial nerves 2-12 grossly intact, normal strength and sensation.  Psychiatric: Alert, oriented to person, place, no obvious anxiety or depression. Confused about events of prior days.  Somewhat disheveled appearance with overgrown fingernails and unkempt beard and hair.      Data   Recent Labs   Lab 02/18/21  0815 02/17/21  2219 02/17/21  1726 02/17/21  1656   WBC 8.6  --   --  19.4*   HGB 13.9  --   --  17.5   MCV 88  --   --  87     --   --  262     --  139 Canceled, Test credited   POTASSIUM 3.7  --  3.7 Canceled, Test credited   CHLORIDE 111*  --  110* Canceled, Test credited   CO2 26  --  23 Canceled, Test credited   BUN 12  --  19 Canceled, Test credited   CR 0.84  --  1.22 Canceled, Test credited   ANIONGAP 6  --  6 Canceled, Test credited   MARCO ANTONIO 8.6  --  8.9 Canceled, Test credited   GLC 92  --  134* Canceled, Test credited   ALBUMIN 3.2*  --   --   --    PROTTOTAL 6.6*  --   --   --    BILITOTAL 0.4  --   --   --    ALKPHOS 123  --   --   --    ALT 39  --   --   --    AST 25  --   --   --    TROPI  --  <0.015  --   --      No results found for this or any previous visit (from the past 24 hour(s)).  Medications       aspirin  325 mg Oral or NG Tube Daily

## 2021-02-22 NOTE — PLAN OF CARE
DATE & TIME: 2/21/21 3837-2710    Cognitive Concerns/ Orientation : Pt A/Ox3, disoriented to time. Forgetful.   BEHAVIOR & AGGRESSION TOOL COLOR: Green   ABNL VS/O2: VSS on RA  MOBILITY: SBA, fall risk  PAIN MANAGMENT: Denies  DIET: Cardiac  BOWEL/BLADDER: Incontinent at times  DRAIN/DEVICES: IV SL  SKIN: Bruised/scabs  D/C DAY/GOALS/PLACE: Awaiting placement/judi discharge plan  OTHER IMPORTANT INFO: Holdable if attempts to leave AMA. Per psych does not have decisional capacity.

## 2021-02-23 PROCEDURE — 250N000013 HC RX MED GY IP 250 OP 250 PS 637: Performed by: HOSPITALIST

## 2021-02-23 PROCEDURE — 99231 SBSQ HOSP IP/OBS SF/LOW 25: CPT | Performed by: PSYCHIATRY & NEUROLOGY

## 2021-02-23 PROCEDURE — 120N000001 HC R&B MED SURG/OB

## 2021-02-23 PROCEDURE — 250N000013 HC RX MED GY IP 250 OP 250 PS 637: Performed by: STUDENT IN AN ORGANIZED HEALTH CARE EDUCATION/TRAINING PROGRAM

## 2021-02-23 PROCEDURE — 99233 SBSQ HOSP IP/OBS HIGH 50: CPT | Performed by: HOSPITALIST

## 2021-02-23 PROCEDURE — 99207 PR CDG-MDM COMPONENT: MEETS LOW - DOWN CODED: CPT | Performed by: PSYCHIATRY & NEUROLOGY

## 2021-02-23 RX ADMIN — ASPIRIN 325 MG ORAL TABLET 325 MG: 325 PILL ORAL at 08:33

## 2021-02-23 RX ADMIN — ATORVASTATIN CALCIUM 40 MG: 40 TABLET, FILM COATED ORAL at 19:38

## 2021-02-23 ASSESSMENT — ACTIVITIES OF DAILY LIVING (ADL)
ADLS_ACUITY_SCORE: 15

## 2021-02-23 NOTE — PROGRESS NOTES
[facility]    Medicine Progress Note - Hospitalist Service       Date of Admission:  2/17/2021  Assessment & Plan       Jose Vazquez is a 75-year-old male with a medical history of recent stroke who presented to the emergency department on 2/17/2021 after he was found in the community down after a fall.    Fall, likely in the setting of physical deconditioning  Patient not able to give any significant collateral history.  Based on his weakness and PT eval's, likely this was due to physical deconditioning.  -TTE without any valvular abnormalities, telemetry NSR, laboratory work-up negative including TSH, CK, LFTs, troponin  - tele NSR, discontinue  - PT/OT, recommending TCU    Concern for cognitive impairment vs Acute encephalopathy (post stroke vs TBI vs other)  Concern for vulnerable adult  Patient previously living independently prior to his recent stroke.  After that he was discharged to a TCU which he left AMA from.  He does not recall the events leading to his AMA discharge.  Although he is oriented at times, significant concerns related to his decision-making ability. Unclear cause for this, likely progressive dementia with recent acute stroke worsening the situation.   This is a difficult situation with patient deemed not decisional by psych and no next of kin or other surrogate decision makers. Patient states he has a neighbor who helps him, but is clear he has never had a medical power of . If he needed a surrogate decision maker, he would like it to be his neighbor.  -OT for cognitive eval (SLUMS 6/30)  -Social work consult, they will file vulnerable adult  -Psych consult 2/19 felt he was not decisional   -Re-consult psych to assist with level of decision making ability. Patient remains adamant he would like to discharge home, but can't safely weigh risk benefit of this. Question if he has the understanding to appoint his neighbor as POA/decision maker. As he has no next of  kin, concern may have to pursue commitment.    - Patient is not decisional. If he attempts to leave, he is holdable.     Addendum:  Dr Andrew Spoke with neighbor Trent Escobar at [phone] on 2/22/21 . He is the son of neighbor Barron. Jose usually goes over to Barron's house daily to chat and will often spend most of the day there. Trent visits his dad every day and is primary caretaker for him. Trent wonders if Jose could ever come to live with his dad in the future, but would like him to complete a stay at TCU first. Trent did not fully commit to this plan, but seemed open to it. Trent will call Jose on 2/23 to encourage him to work with therapies and accept TCU placement. Discussed with care coordinator. If psych believes him decisional, may be able to appoint Trent as POA/decision maker for major decisions and then send to TCU.      Need clearance of Psych regarding decisional capability and MSW regarding process.    Hematuria  Noted on urinalysis for many years  -Follow-up with outpatient urology discharge    Concern for UTI  -Patient denies urinary symptoms  -Urine culture with no organisms  -Discontinue ceftriaxone    HTN  -Hold PTA amlodipine  -Blood pressure somewhat erratic, in general WNL.  Has as needed's.  -As needed's available    Subacute left basal ganglia stroke with possible petechial hemorrhage within the above infarct  Chronic-appearing infarct in the inferior medial right cerebellum  Multiple probable chronic microhemorrhages within both cerebral hemispheres and cerebellum  Right carotid stenosis  Presented on 1/14/2021 with speech/language impairment and gait disturbance.  On review of chart patient was then offered TPA, he had declined.   A1c 5.6%, LDL 93. TTE - EF normal, normal sized atria  -Restart PTA aspirin 325 mg oral daily  -outpatient f/u with neurology for carotid stenosis  - resume statin     THADDEUS, pre-renal, resolved  During admission last month noted to have a creatinine of  "1.41 that improved to 1.01.  Have again presents with creatinine of 1.22.  Avoid nephrotoxic medications.   -Last CR WNL.  Eating, taking in fluids.    Chronic obstructive pulmonary disease history  He smoked for over 40 years, a pack a day, but quit a few years ago.  Not requiring any inhalers.  Monitor        Diet: Combination Diet Low Saturated Fat Na <2400mg Diet    DVT Prophylaxis: Pneumatic Compression Devices  Shen Catheter: not present  Code Status: Full Code           Disposition Plan   Expected discharge: TBD, given complex neurocognitive/disposition issues regarding decisional capabilities and possible need for guardianship versus POA.  Ongoing discussion with SW.  Entered: Louis Delgado MD 02/23/2021, 1:03 PM     Total unit/floor time 35 minutes:  time consisted of the following, examination of patient, review of records including labs, imaging results, medications, interdisciplinary notes and completing documentation; > 50%  Coordination of Care time with Nursing and Specialists, Psych regarding care plan and  discharge planning is related to neuro psychiatric capabilities..      ______________________________________________________________________    Interval History   Patient appears comfortable however history was limited.  On any discussion he states he 'talk to \"Don Escobar', the neighbor as above..  He states stated he was tired of talking about this.  Did not cooperate with discussion.         Data reviewed today: I reviewed all medications, new labs and imaging results over the last 24 hours. I personally reviewed no images or EKG's today.    Physical Exam   Vital Signs: Temp: 97.6  F (36.4  C) Temp src: Oral BP: 119/86 Pulse: 86   Resp: 18 SpO2: 95 % O2 Device: None (Room air)    Weight: 171 lbs 4.76 oz  Constitutional: NAD, awake, noncooperative to discussion.    Eyes: Conjunctiva and pupils examined and normal.  HEENT: Moist mucous membranes, normal dentition.  Respiratory: Clear to " auscultation bilaterally, no crackles or wheezing.  Cardiovascular: Regular rate and rhythm, normal S1 and S2, and no murmur noted.  GI: Soft, non-distended, non-tender, normal bowel sounds.  Skin: No rashes, no cyanosis, no edema noted on exposed skin.  Musculoskeletal: No joint swelling, erythema or tenderness. No gross bony abnormalities  Neurologic: Cranial nerves 2-12 grossly intact, normal strength and sensation.  Psychiatric: Awake, orientation cannot be tested.  Affect, oppositional, not cooperative.      Data   Recent Labs   Lab 02/18/21  0815 02/17/21  2219 02/17/21  1726 02/17/21  1656   WBC 8.6  --   --  19.4*   HGB 13.9  --   --  17.5   MCV 88  --   --  87     --   --  262     --  139 Canceled, Test credited   POTASSIUM 3.7  --  3.7 Canceled, Test credited   CHLORIDE 111*  --  110* Canceled, Test credited   CO2 26  --  23 Canceled, Test credited   BUN 12  --  19 Canceled, Test credited   CR 0.84  --  1.22 Canceled, Test credited   ANIONGAP 6  --  6 Canceled, Test credited   MARCO ANTONIO 8.6  --  8.9 Canceled, Test credited   GLC 92  --  134* Canceled, Test credited   ALBUMIN 3.2*  --   --   --    PROTTOTAL 6.6*  --   --   --    BILITOTAL 0.4  --   --   --    ALKPHOS 123  --   --   --    ALT 39  --   --   --    AST 25  --   --   --    TROPI  --  <0.015  --   --      Medications       aspirin  325 mg Oral or NG Tube Daily     atorvastatin  40 mg Oral QPM

## 2021-02-23 NOTE — PLAN OF CARE
"PT: Pt declining OOB activity with PT, pt stating \"I want to get out of here and I'm not taking no for an answer\" cancel PT   "

## 2021-02-23 NOTE — PLAN OF CARE
DATE & TIME: 2/22/21 3-11pm  Cognitive Concerns/ Orientation : Pt A/Ox4. Forgetful.   BEHAVIOR & AGGRESSION TOOL COLOR: Green. Calm and cooperative. No attempt to leave   ABNL VS/O2: VSS on RA  MOBILITY: SBA.  PAIN MANAGMENT: Denies  DIET: Cardiac  BOWEL/BLADDER: Incontinent at times  DRAIN/DEVICES: IV SL  SKIN: Bruised/scabs  D/C DAY/GOALS/PLACE: Awaiting placement/judi discharge plan  OTHER IMPORTANT INFO: Holdable if attempts to leave AMA. Per psych does not have decisional capacity.

## 2021-02-23 NOTE — PLAN OF CARE
DATE & TIME: 02/22/21 5412-7148   Cognitive Concerns/ Orientation: A & O x2. Appears to have labile mood, complains about not being happy & frustrated regarding current hospitalization.   BEHAVIOR & AGGRESSION TOOL COLOR: Yellow  ABNL VS/O2: VSS on RA except HTN.   MOBILITY: SBA, steady gait.   PAIN MANAGMENT: Denies  DIET: Cardiac/Heart healthy.   BOWEL/BLADDER: Incontinent/dribbling at times.   DRAIN/DEVICES: Found to have no IV access? Unsure of when it came out. Did not attempt to replace over night.   SKIN: Bruised/scabs.  D/C DAY/GOALS/PLACE: TBD awaiting placement/safe discharge plan.   OTHER IMPORTANT INFO: Psych re consulted for today. Impulsive does not utilize call light. Stable over night.

## 2021-02-23 NOTE — CONSULTS
"[facility]  Psychiatry Consultation - Follow-up note      Interim History:   Follow-up requested today to comment regarding the patient's decision-making capacity regarding his request to appoint his neighbor as his designated decision-maker.  Unit care coordinator informs me that the patient's does not have a next of kin available to assume this role.  His slums score is noted to be 6/30.  A decision maker is needed to aid in gaining consent to proceed with placement options.  On February 19, Dr. Blandon noted that the patient does not have capacity for medical decision-making.    On examination today, the patient was fairly content and denied any significant mood or anxiety symptoms.  He was on the phone speaking with his neighbor Barron.  We discussed the need for a designated decision maker.  He is aware that the main topic needing consents at this time is regarding disposition however there could be other medical related questions that the designated person would need to be relied on.  Patient very easily identified his neighbor Barron as a person he would like to designate.  He states that Barron is in his 80s regarding age and they have lived as neighbors for a number of years.  He explains that he  frequently spends time directly with this person, and has a fair amount of trust in this person.  The patient reports having no children or family members whom he would like to designate.    He denies psychotic symptoms.  He is sleeping and eating adequately.  He did not have any mental health related concerns today.           Medications:       aspirin  325 mg Oral or NG Tube Daily     atorvastatin  40 mg Oral QPM          Allergies:   No Known Allergies       Labs:   No results found for this or any previous visit (from the past 24 hour(s)).       Psychiatric Examination:     /77 (BP Location: Right arm)   Pulse 86   Temp 98  F (36.7  C) (Oral)   Resp 18   Ht 1.854 m (6' 1\")   Wt 77.7 " kg (171 lb 4.8 oz)   SpO2 93%   BMI 22.60 kg/m    Weight is 171 lbs 4.76 oz  Body mass index is 22.6 kg/m .  Orthostatic Vitals     None            Appearance: awake, alert  Attitude:  cooperative  Eye Contact:  fair  Mood:  better  Affect:  appropriate and in normal range  Speech:  clear, coherent  Psychomotor Behavior:  no evidence of tardive dyskinesia, dystonia, or tics  Throught Process:  linear  Associations:  no loose associations  Thought Content:  no evidence of suicidal ideation or homicidal ideation and no evidence of psychotic thought  Insight:  partial  Judgement:  limited  Oriented to:  Person and place  Attention Span and Concentration:  limited  Recent and Remote Memory:  limited           DIagnoses:     Possible neurocognitive disorder  Delirium due to another medical condition (recent stroke, recent fall), improving      Recommendations:     The patient does appear to have capacity regarding his request to a point his neighbor Don as his designated decision-maker for major medical decisions.  There does not appear to be a next of kin or sibling whom he would like to designate for this role.     Please reconsult with Psychiatry as needed.   Heriberto Lee MD   Text Page

## 2021-02-23 NOTE — PLAN OF CARE
DATE & TIME: 02/23/21 0700-1930  Cognitive Concerns/ Orientation: A&Ox3, disoriented to time  BEHAVIOR & AGGRESSION TOOL COLOR: Yellow; although calm/cooperative this shift  ABNL VS/O2: VSS on RA  MOBILITY: SBA, steady gait; refuses ambulation in wilson   PAIN MANAGMENT: Denies  DIET: Cardiac diet, fair appetite   BOWEL/BLADDER: Incontinent/dribbling at times.   DRAIN/DEVICES: no IV access  SKIN: Bruised/scabs  D/C DAY/GOALS/PLACE: TBD awaiting placement/safe discharge plan.   OTHER IMPORTANT INFO: Psych re consulted- see note. Impulsive, does not utilize call light. PT, OT following.

## 2021-02-24 ENCOUNTER — DOCUMENTATION ONLY (OUTPATIENT)
Dept: OTHER | Facility: CLINIC | Age: 76
End: 2021-02-24

## 2021-02-24 PROCEDURE — 250N000013 HC RX MED GY IP 250 OP 250 PS 637: Performed by: STUDENT IN AN ORGANIZED HEALTH CARE EDUCATION/TRAINING PROGRAM

## 2021-02-24 PROCEDURE — 250N000013 HC RX MED GY IP 250 OP 250 PS 637: Performed by: HOSPITALIST

## 2021-02-24 PROCEDURE — 99233 SBSQ HOSP IP/OBS HIGH 50: CPT | Performed by: HOSPITALIST

## 2021-02-24 PROCEDURE — 120N000001 HC R&B MED SURG/OB

## 2021-02-24 RX ADMIN — ASPIRIN 325 MG ORAL TABLET 325 MG: 325 PILL ORAL at 08:15

## 2021-02-24 RX ADMIN — ATORVASTATIN CALCIUM 40 MG: 40 TABLET, FILM COATED ORAL at 20:44

## 2021-02-24 ASSESSMENT — ACTIVITIES OF DAILY LIVING (ADL)
ADLS_ACUITY_SCORE: 15

## 2021-02-24 NOTE — PROGRESS NOTES
"BRIEF NUTRITION ASSESSMENT    REASON FOR ASSESSMENT:  Jose Vazquez is a 75 year old male seen by Registered Dietitian for [facility]    NUTRITION HISTORY:  - History of acute stroke, presented after a fall. Pt was recently admitted here 1/14-1/19, then sent to [facility]. He reportedly left there AMA on the day of presentation 2/17.   - Reviewed recorded intakes from last admission - consumed % of meals at that time.     CURRENT DIET AND INTAKE:  Diet: Low Saturated Fat, Na <2400 mg    % intakes recorded for meals ordered BID-TID most days.     ANTHROPOMETRICS:  Height: 6' 1\"  Weight:  171 lbs 4.76 oz (77.7 kg)   Body mass index is 22.6 kg/m .   Weight Status: Normal BMI  IBW:  83.6 kg   %IBW: 93%  Weight History: potential 3# loss in the past 1 month (1.7%) - not significant.  Wt Readings from Last 10 Encounters:   02/17/21 77.7 kg (171 lb 4.8 oz)   01/19/21 79 kg (174 lb 3.2 oz)   12/19/19 84.8 kg (187 lb)   03/13/17 89.4 kg (197 lb)   04/05/16 84.3 kg (185 lb 12.8 oz)   03/02/16 81.6 kg (179 lb 12.8 oz)   02/02/16 77.7 kg (171 lb 4.8 oz)   01/19/16 83.3 kg (183 lb 11.2 oz)   01/12/16 83.3 kg (183 lb 11.2 oz)   12/29/14 81.4 kg (179 lb 6.4 oz)       LABS:  Reviewed    MALNUTRITION:  Patient does not meet two of the criteria necessary for diagnosing malnutrition.     NUTRITION INTERVENTION:  Nutrition Diagnosis:  No nutrition diagnosis at this time.    Implementation:  Nutrition Education: Per Provider order if indicated    FOLLOW UP/MONITORING:   Will re-evaluate in 7 - 10 days, or sooner, if re-consulted.    Jewels Sahu RD, [facility], [address], [facility]   Pager: [phone]  Weekend Pager: [phone]    "

## 2021-02-24 NOTE — PLAN OF CARE
DATE & TIME: 02/23/21 1900-0730  Cognitive Concerns/ Orientation: A&Ox3, disoriented to time  BEHAVIOR & AGGRESSION TOOL COLOR: Yellow; although calm/cooperative this shift  ABNL VS/O2: VSS on RA  MOBILITY: SBA, steady gait; refuses ambulation in wilson   PAIN MANAGMENT: Denies  DIET: Cardiac diet, fair appetite   BOWEL/BLADDER: Incontinent/dribbling at times.   DRAIN/DEVICES: no IV access  SKIN: Bruised/scabs  D/C DAY/GOALS/PLACE: TBD awaiting placement/safe discharge plan.   OTHER IMPORTANT INFO: Psych re consulted- see note. Impulsive, does not utilize call light. PT, OT following. LS diminished.

## 2021-02-24 NOTE — PROGRESS NOTES
MD spoke with patient in my presence. Patient confirmed that he would like Adarsh Hodge [phone] to be his health care appointee. Patient requested writer to initiate paper work for health care directive. Writer informed  who will contact patient and assist with health care directive. Writer spoke with Trent and informed of patients choice. Trent is in full agreement to assist patient in making decision  And care for patient at home if needed.

## 2021-02-24 NOTE — PROGRESS NOTES
[facility]    Medicine Progress Note - Hospitalist Service       Date of Admission:  2/17/2021  Assessment & Plan       Jose Vazquez is a 75-year-old male with a medical history of recent stroke who presented to the emergency department on 2/17/2021 after he was found in the community down after a fall.    Fall, likely in the setting of physical deconditioning  Patient not able to give any significant collateral history.  Based on his weakness and PT eval's, likely this was due to physical deconditioning.  -TTE without any valvular abnormalities, telemetry NSR, laboratory work-up negative including TSH, CK, LFTs, troponin  - tele NSR, discontinue  - PT/OT, recommending TCU    Concern for cognitive impairment vs Acute encephalopathy (post stroke vs TBI vs other)  Concern for vulnerable adult  Patient previously living independently prior to his recent stroke.  After that he was discharged to a TCU which he left AMA from.  He does not recall the events leading to his AMA discharge.  Although he is oriented at times, significant concerns related to his decision-making ability. Unclear cause for this, likely progressive dementia with recent acute stroke worsening the situation.   This is a difficult situation with patient deemed not decisional on intial evaluation by psych and no next of kin or other surrogate decision makers. Patient states he has a neighbor who helps him, but is clear he has never had a medical power of . If he needed a surrogate decision maker, he would like it to be his neighbor.  -OT for cognitive eval (SLUMS 6/30)  -Social work consult, they will file vulnerable adult  -Psych consult 2/19 felt he was not decisional   -Re-consult psych to assist with level of decision making ability. Patient remains adamant he would like to discharge home, but can't safely weigh risk benefit of this. Question if he has the understanding to appoint his neighbor as POA/decision maker.  As he has no next of kin, concern may have to pursue commitment.    - Patient is not decisional. If he attempts to leave, he is holdable.     Addendum:  Dr Ramos Spoke with neighbor Trent Escobar at [phone] on 2/22/21 . He is the son of neighbor Barron. Jose usually goes over to Barron's house daily to chat and will often spend most of the day there. Trent visits his dad every day and is primary caretaker for him. Trent wonders if Jose could ever come to live with his dad in the future, but would like him to complete a stay at TCU first. Trent did not fully commit to this plan, but seemed open to it. Trent will call Jose on 2/23 to encourage him to work with therapies and accept TCU placement. Discussed with care coordinator. If psych believes him decisional, may be able to appoint Trent as decision maker for major decisions and then arrange discharge to TCU.      Reconsult to Psychiatry 2/2321 indicates per Psychiatry,  felt patient had capacity of self decision making.  However Psychiatry note indicates that patient elects neighbor Barron as who he would want to assist with medical decisions.  Clarified with  2/24/2021 that patient elects the neighbor's son Trent as healthcare appointee to assist with decisions.  During this encounter with  present patient clearly identified Trent as who he would want to assist with medical decisions.  In addition patient acknowledged and was receptive to discharge to TCU.   to proceed with documentations regarding these plans.  Further discussed with  who  discussed execution of Patient's directive. See  note.   stated she would further discuss with Psychiatry to clarify who Patient directed as healthcare appointee    Hematuria  Noted on urinalysis for many years  -Follow-up with outpatient urology discharge    Concern for UTI  -Patient denies urinary symptoms  -Urine culture with no  organisms  -Discontinue ceftriaxone    HTN  -Hold PTA amlodipine  -Blood pressure somewhat erratic, in general WNL.    -As needed's hydralazine and metoprolol available    Subacute left basal ganglia stroke with possible petechial hemorrhage within the above infarct  Chronic-appearing infarct in the inferior medial right cerebellum  Multiple probable chronic microhemorrhages within both cerebral hemispheres and cerebellum  Right carotid stenosis  Presented on 1/14/2021 with speech/language impairment and gait disturbance.  On review of chart patient was then offered TPA, he had declined.   A1c 5.6%, LDL 93. TTE - EF normal, normal sized atria  -Restart PTA aspirin 325 mg oral daily  -outpatient f/u with neurology for carotid stenosis  - resume statin     THADDEUS, pre-renal, resolved  During admission last month noted to have a creatinine of 1.41 that improved to 1.01.  Have again presents with creatinine of 1.22.  Avoid nephrotoxic medications.   -Last CR WNL.  Eating, taking in fluids.    Chronic obstructive pulmonary disease history  He smoked for over 40 years, a pack a day, but quit a few years ago.  Not requiring any inhalers.  Monitor        Diet: Combination Diet Low Saturated Fat Na <2400mg Diet    DVT Prophylaxis: Pneumatic Compression Devices  Shen Catheter: not present  Code Status: Full Code           Disposition Plan   Expected discharge: TBD, given complex neurocognitive/disposition issues regarding decisional capabilities, see above.   Entered: Louis Delgado MD 02/24/2021, 4:46 PM     I spent >35 minutes on the unit/floor in management of care today.  Over 50% of my time was spent Coordinating Care and plan with  and  regarding decisional capacity of Patient and his election of health care appointee; and discharge planning. ______________________________________________________________________    Interval History   Was markedly more alert, conversive today, could clearly  articulate his wants and needs, encounter with  present, see above.    Data reviewed today: I reviewed all medications, new labs and imaging results over the last 24 hours.     Physical Exam   Vital Signs: Temp: 97.7  F (36.5  C) Temp src: Oral BP: (!) 146/71 Pulse: 91   Resp: 18 SpO2: 93 % O2 Device: None (Room air)    Weight: 171 lbs 4.76 oz  Constitutional:   NAD, alert, calm, cooperative    Eyes: Conjunctiva and pupils examined and normal.  HEENT: Moist mucous membranes, normal dentition.  Respiratory: Clear to auscultation bilaterally, no crackles or wheezing.  Cardiovascular: Regular rate and rhythm, normal S1 and S2, and no murmur noted.  GI: Soft, non-distended, non-tender, normal bowel sounds.  Skin: No rashes, no cyanosis, no edema noted on exposed skin.  Musculoskeletal: No joint swelling, erythema or tenderness. No gross bony abnormalities  Neurologic: Gross testing  normal strength and sensation.  Psychiatric: Awake, cooperative, affect calm    Data   Recent Labs   Lab 02/18/21  0815 02/17/21  2219 02/17/21  1726 02/17/21  1656   WBC 8.6  --   --  19.4*   HGB 13.9  --   --  17.5   MCV 88  --   --  87     --   --  262     --  139 Canceled, Test credited   POTASSIUM 3.7  --  3.7 Canceled, Test credited   CHLORIDE 111*  --  110* Canceled, Test credited   CO2 26  --  23 Canceled, Test credited   BUN 12  --  19 Canceled, Test credited   CR 0.84  --  1.22 Canceled, Test credited   ANIONGAP 6  --  6 Canceled, Test credited   MARCO ANTONIO 8.6  --  8.9 Canceled, Test credited   GLC 92  --  134* Canceled, Test credited   ALBUMIN 3.2*  --   --   --    PROTTOTAL 6.6*  --   --   --    BILITOTAL 0.4  --   --   --    ALKPHOS 123  --   --   --    ALT 39  --   --   --    AST 25  --   --   --    TROPI  --  <0.015  --   --      Medications       aspirin  325 mg Oral or NG Tube Daily     atorvastatin  40 mg Oral QPM

## 2021-02-24 NOTE — PROGRESS NOTES
SPIRITUAL HEALTH SERVICES  SPIRITUAL ASSESSMENT Progress Note  [facility]     REFERRAL SOURCE: Phone referral by Care Coordinator for health care directive    Responded to a request for Health Care Directive. Reviewed purpose and process for completing the document. Jose shared that he wishes to name a friend, Trent, as his health care agent.   Communicated with care coordinator and hospitalist and arranged for notarization through [employer].    Update: HCD signed and sent to [employer] for notarization and upload to Jose's electronic record.    PLAN:  remains available for support as requested.    Daphne Barr  Associate   Pager [phone]  Phone [phone]

## 2021-02-25 ENCOUNTER — APPOINTMENT (OUTPATIENT)
Dept: OCCUPATIONAL THERAPY | Facility: CLINIC | Age: 76
DRG: 070 | End: 2021-02-25
Payer: COMMERCIAL

## 2021-02-25 PROCEDURE — 250N000013 HC RX MED GY IP 250 OP 250 PS 637: Performed by: HOSPITALIST

## 2021-02-25 PROCEDURE — 99233 SBSQ HOSP IP/OBS HIGH 50: CPT | Performed by: HOSPITALIST

## 2021-02-25 PROCEDURE — 250N000013 HC RX MED GY IP 250 OP 250 PS 637: Performed by: STUDENT IN AN ORGANIZED HEALTH CARE EDUCATION/TRAINING PROGRAM

## 2021-02-25 PROCEDURE — 120N000001 HC R&B MED SURG/OB

## 2021-02-25 PROCEDURE — 97535 SELF CARE MNGMENT TRAINING: CPT | Mod: GO

## 2021-02-25 RX ADMIN — ASPIRIN 325 MG ORAL TABLET 325 MG: 325 PILL ORAL at 07:59

## 2021-02-25 RX ADMIN — ATORVASTATIN CALCIUM 40 MG: 40 TABLET, FILM COATED ORAL at 19:58

## 2021-02-25 ASSESSMENT — ACTIVITIES OF DAILY LIVING (ADL)
ADLS_ACUITY_SCORE: 15
ADLS_ACUITY_SCORE: 11
ADLS_ACUITY_SCORE: 11
ADLS_ACUITY_SCORE: 15

## 2021-02-25 NOTE — PLAN OF CARE
RN: pt alert to self, disoriented to time/situation, wants to discharge home but we have been unable to reach new health care agent Adarsh for discharge planning.  Skin dry/flaky, could use a shower; refuses bath cares offered by NA but RN was able to provide nail care and lotion for pt today.  Up with SBA.  Assisted with ordering meals, fair appetite.  No IV access.

## 2021-02-25 NOTE — PROGRESS NOTES
3 messages left for patient appointed health care person Trent and have not received a return call. Writer spoke with patient and said that he will not be able to leave unless we hear from Trent.

## 2021-02-25 NOTE — PROGRESS NOTES
"Pt states \"I am waiting to hear back about what time I can discharge.\"  Per CM-RN Stevenson pt could discharge if able to reach and establish plan with friend Trent who has indicated ability to care for patient.  Called Trent ([phone]), left generic voicemail with request to call back [facility] to talk to nurse or CM-RN.  "

## 2021-02-25 NOTE — PLAN OF CARE
DATE & TIME: 2/24/21 0700-1930  Cognitive Concerns/ Orientation: A&Ox3, disoriented to time  BEHAVIOR & AGGRESSION TOOL COLOR: Yellow; although calm/cooperative this shift  ABNL VS/O2: VSS on RA  MOBILITY: SBA, steady gait; refuses ambulation in [place]   PAIN MANAGMENT: Denies  DIET: Cardiac diet, fair appetite   BOWEL/BLADDER: Incontinent/dribbling at times.   DRAIN/DEVICES: no IV access  SKIN: Bruised/scabs  D/C DAY/GOALS/PLACE: TBD awaiting placement/safe discharge plan.   OTHER IMPORTANT INFO: Impulsive, does not utilize call light. PT, OT following.

## 2021-02-25 NOTE — PLAN OF CARE
DATE & TIME: 2/24/2021 2430-0512  Cognitive Concerns/ Orientation: A & O x2 disorientated to time and situation. Flat/cooperative over night.   BEHAVIOR & AGGRESSION TOOL COLOR: Green  ABNL VS/O2: VSS on RA.   MOBILITY: SBA, steady gait.  PAIN MANAGMENT:  Denies  DIET: Cardiac diet   BOWEL/BLADDER: Incontinent/dribbling at times.   ABNL LAB/BG: No labs since 02/18.   DRAIN/DEVICES: No IV access  TELEMETRY RHYTHM: N/A  SKIN: Bruised/scabs.  TESTS/PROCEDURES: None scheduled.   D/C DAY/GOALS/PLACE:  TBD awaiting placement/safe discharge planing  OTHER IMPORTANT INFO:  Impulsive, doesn't utilize call llight. PT, OT following. Stable over night/no complaints.

## 2021-02-25 NOTE — PLAN OF CARE
ATE & TIME: 2/24/2021 1930 to 2330   Cognitive Concerns/ Orientation :A&Ox3 disorientated to time  BEHAVIOR & AGGRESSION TOOL COLOR:  green  ABNL VS/O2:VSS on RA  MOBILITY: SBA, steady gait, refuses ambulating in the [place]  PAIN MANAGMENT:  Denies  DIET: Cardiac diet fair appetite  BOWEL/BLADDER:  incontinent/dribbling at times  ABNL LAB/BG: none  DRAIN/DEVICES:  no IV access  TELEMETRY RHYTHM: NA  SKIN:  bruised/scabs  TESTS/PROCEDURES:   D/C DAY/GOALS/PLACE:  TBD awaiting placement/safe discharge planing  OTHER IMPORTANT INFO:  impulsive, doesn't utilize call llight. PT, OT followi

## 2021-02-26 ENCOUNTER — APPOINTMENT (OUTPATIENT)
Dept: OCCUPATIONAL THERAPY | Facility: CLINIC | Age: 76
DRG: 070 | End: 2021-02-26
Payer: COMMERCIAL

## 2021-02-26 ENCOUNTER — APPOINTMENT (OUTPATIENT)
Dept: PHYSICAL THERAPY | Facility: CLINIC | Age: 76
DRG: 070 | End: 2021-02-26
Payer: COMMERCIAL

## 2021-02-26 PROCEDURE — 250N000013 HC RX MED GY IP 250 OP 250 PS 637: Performed by: HOSPITALIST

## 2021-02-26 PROCEDURE — 97535 SELF CARE MNGMENT TRAINING: CPT | Mod: GO | Performed by: OCCUPATIONAL THERAPIST

## 2021-02-26 PROCEDURE — 97530 THERAPEUTIC ACTIVITIES: CPT | Mod: GP

## 2021-02-26 PROCEDURE — 250N000013 HC RX MED GY IP 250 OP 250 PS 637: Performed by: STUDENT IN AN ORGANIZED HEALTH CARE EDUCATION/TRAINING PROGRAM

## 2021-02-26 PROCEDURE — 97116 GAIT TRAINING THERAPY: CPT | Mod: GP

## 2021-02-26 PROCEDURE — 99233 SBSQ HOSP IP/OBS HIGH 50: CPT | Performed by: HOSPITALIST

## 2021-02-26 PROCEDURE — 120N000001 HC R&B MED SURG/OB

## 2021-02-26 RX ADMIN — ASPIRIN 325 MG ORAL TABLET 325 MG: 325 PILL ORAL at 10:00

## 2021-02-26 RX ADMIN — ATORVASTATIN CALCIUM 40 MG: 40 TABLET, FILM COATED ORAL at 20:42

## 2021-02-26 ASSESSMENT — ACTIVITIES OF DAILY LIVING (ADL)
ADLS_ACUITY_SCORE: 11
ADLS_ACUITY_SCORE: 13
ADLS_ACUITY_SCORE: 13

## 2021-02-26 ASSESSMENT — MIFFLIN-ST. JEOR: SCORE: 1541.42

## 2021-02-26 NOTE — PLAN OF CARE
"DATE & TIME: 2/26/21 7-3 pm   Cognitive Concerns/ Orientation: A & O x2 disorientated to situation & time. Forgetful.   BEHAVIOR & AGGRESSION TOOL COLOR: Green  ABNL VS/O2: VSS on RA except HTN.   MOBILITY: SBA, steady gait.  PAIN MANAGMENT:  Denies  DIET: Low saturated fat, poor appetite.   BOWEL/BLADDER: Incontinent of urine. Ambulates to BR.    ABNL LAB/BG: No labs since 02/18.   DRAIN/DEVICES: No IV access.  TELEMETRY RHYTHM: N/A  SKIN: Pale. Bruising on legs.   TESTS/PROCEDURES: None scheduled.   D/C DAY/GOALS/PLACE: TBD awaiting placement/safe discharge plan.   OTHER IMPORTANT INFO:  Impulsive, doesn't utilize call llight. PT, OT following. Continuously asking \"when is he going home today\". Adarsh called, updated CC to call him back. Pt unable to go home. SW following.   "

## 2021-02-26 NOTE — PROGRESS NOTES
[facility]    Medicine Progress Note - Hospitalist Service       Date of Admission:  2/17/2021  Assessment & Plan       Jose Vazquez is a 75-year-old male with a medical history of recent stroke who presented to the emergency department on 2/17/2021 after he was found in the community down after a fall.    Fall, likely in the setting of physical deconditioning  Patient not able to give any significant collateral history.  Based on his weakness and PT eval's, likely this was due to physical deconditioning.  -TTE without any valvular abnormalities, telemetry NSR, laboratory work-up negative including TSH, CK, LFTs, troponin  - tele NSR, discontinue  - working with OT/PT    Concern for cognitive impairment vs Acute encephalopathy (post stroke vs TBI vs other)  Concern for vulnerable adult  Patient previously living independently prior to his recent stroke.  After that he was discharged to a TCU which he left AMA from.  He does not recall the events leading to his AMA discharge.  Although he is oriented at times, significant concerns related to his decision-making ability. Unclear cause for this, likely progressive dementia with recent acute stroke worsening the situation.   This is a difficult situation with patient deemed not decisional on intial evaluation by psych and no next of kin or other surrogate decision makers. Patient states he has a neighbor who helps him, but is clear he has never had a medical power of . If he needed a surrogate decision maker, he would like it to be his neighbor.  -OT for cognitive eval (SLUMS 6/30)  -Social work consult, they will file vulnerable adult  -Psych consult 2/19 felt he was not decisional   -Re-consult psych to assist with level of decision making ability. Patient remains adamant he would like to discharge home, but can't safely weigh risk benefit of this. As he has no next of kin, concern may have to pursue commitment. Patient is not  decisional. If he attempts to leave, he is holdable.     Addendum:  Dr Ramos Spoke with neighbor Trent Escobar at [phone] on 2/22/21 . He is the son of neighbor Barron. Jose usually goes over to Barron's house daily to chat and will often spend most of the day there. Trent visits his dad every day and is primary caretaker for him. Trent wonders if Jose could ever come to live with his dad in the future, but would like him to complete a stay at TCU first. Trent did not fully commit to this plan, but seemed open to it. Trent will call Jose on 2/23 to encourage him to work with therapies and accept TCU placement. Discussed with care coordinator. If psych believes him decisional, may be able to appoint Trent as decision maker for major decisions and then arrange discharge to TCU.      Reconsult to Psychiatry 2/2321 indicates per Psychiatry,  felt patient had capacity of self decision making however Psychiatry note indicates that patient elects neighbor Barron as who he would want to assist with medical decisions.  Clarified with  2/24/2021 that patient elects the neighbor's son Trent as healthcare appointee to assist with decisions.  During this encounter with  present patient identified Trent as who he would want to assist with medical decisions   to proceed with documentations regarding these plans.        -See social work note today 2/26/2021, unfortunately Trent Escobar who previously had identified would be willing to take on the role of healthcare appointee to assist in healthcare decisions has decided to revoke that responsibility.  Therefore without next of kin and no other healthcare appointee to assist with medical decisions may need to proceed with guardianship.  May also need to consider revoking current healthcare directive for decision making [as appointee has now declined and without healthcare appointee if Patient wants to leave the patient would appear  to be holdable].  Discussed at length with Nursing and SW, needs further discussion with Case Management team to decide on best course of action.    Hematuria  Noted on urinalysis for many years  -Follow-up with outpatient urology discharge    Concern for UTI  -Patient denies urinary symptoms  -Urine culture with no organisms  -Discontinue ceftriaxone    HTN  -Hold PTA amlodipine  -Blood pressure somewhat erratic, in general WNL.    -As needed's hydralazine and metoprolol available    Subacute left basal ganglia stroke with possible petechial hemorrhage within the above infarct  Chronic-appearing infarct in the inferior medial right cerebellum  Multiple probable chronic microhemorrhages within both cerebral hemispheres and cerebellum  Right carotid stenosis  Presented on 1/14/2021 with speech/language impairment and gait disturbance.  On review of chart patient was then offered TPA, he had declined.   A1c 5.6%, LDL 93. TTE - EF normal, normal sized atria  -Restart PTA aspirin 325 mg oral daily  -outpatient f/u with neurology for carotid stenosis  - resume statin     THADDEUS, pre-renal, resolved  During admission last month noted to have a creatinine of 1.41 that improved to 1.01.  Have again presents with creatinine of 1.22.  Avoid nephrotoxic medications.   -Last CR WNL.  Eating, taking in fluids. Update BMP    Chronic obstructive pulmonary disease history  He smoked for over 40 years, a pack a day, but quit a few years ago.  Not requiring any inhalers.  Monitor        Diet: Combination Diet Low Saturated Fat Na <2400mg Diet    DVT Prophylaxis: Pneumatic Compression Devices  Shen Catheter: not present  Code Status: Full Code           Disposition Plan   Expected discharge: TBD, given complex neurocognitive/disposition issues as related to safe disposition and discharge planning. Entered: Louis Delgado MD 02/26/2021, 2:54 PM    ______________________________________________________________________    Interval  History   Patient was alert, interactive.  Oriented to person and place, not time.  Nursing states patient became somewhat distraught when discussed his future plans.  Patient responds to reassurance/redirection.      Data reviewed today: I reviewed all medications, new labs and imaging results over the last 24 hours.     Physical Exam   Vital Signs: Temp: 97.6  F (36.4  C) Temp src: Oral BP: (!) 132/93 Pulse: 91   Resp: 16 SpO2: 94 % O2 Device: None (Room air)    Weight: 163 lbs 11.2 oz  Constitutional:    NAD, alert, calm, cooperative    Eyes: Conjunctiva and pupils examined and normal.  HEENT: Moist mucous membranes, normal dentition.  Respiratory: Clear to auscultation bilaterally, no crackles or wheezing.  Cardiovascular: Regular rate and rhythm, normal S1 and S2, and no murmur noted.  GI: Soft, non-distended, non-tender, normal bowel sounds.  Skin: No rashes, no cyanosis, no edema noted on exposed skin.  Musculoskeletal: No joint swelling, erythema or tenderness. No gross bony abnormalities  Neuro.  Gross motor tested, nonfocal, sensory intact  Psych oriented, affect calm          Medications       aspirin  325 mg Oral or NG Tube Daily     atorvastatin  40 mg Oral QPM       I spent >35 minutes on the unit/floor in management of care today.  Over 50% of my time was spent Coordinating Care and plan with Nursing and SW regarding complex neurocognitive/disposition issues and most recent development that prior healthcare appointee to assist in medical decision has now declined.

## 2021-02-26 NOTE — PLAN OF CARE
DATE & TIME: 2/25/2021 3-11pm shift  Cognitive Concerns/ Orientation: A & O x2 disorientated to time and situation.cooperative, calm  BEHAVIOR & AGGRESSION TOOL COLOR: Green  ABNL VS/O2: VSS on RA.   MOBILITY: SBA, steady gait.  PAIN MANAGMENT:  Denies  DIET: Cardiac diet   BOWEL/BLADDER: Incontinent/dribbling at times.   ABNL LAB/BG: No labs since 02/18.   DRAIN/DEVICES: No IV access  TELEMETRY RHYTHM: N/A  SKIN: Bruised/scabs, lotion applied today.  TESTS/PROCEDURES: None scheduled.   D/C DAY/GOALS/PLACE:  TBD awaiting placement/safe discharge planing, need neighbor to return call and plan.  OTHER IMPORTANT INFO:  Impulsive, doesn't utilize call llight. PT, OT following. Stable over night/no complaints.

## 2021-02-26 NOTE — PROGRESS NOTES
"Care Management Follow Up    Length of Stay (days): 8    Expected Discharge Date: 02/27/21-TCU     Concerns to be Addressed: discharge planning     Patient plan of care discussed at interdisciplinary rounds: Yes    Anticipated Discharge Disposition: Long Term Care vs TCU     Anticipated Discharge Services:    Anticipated Discharge DME:      Patient/family educated on Medicare website which has current facility and service quality ratings:    Education Provided on the Discharge Plan:    Patient/Family in Agreement with the Plan: no    Referrals Placed by CM/SW:    Private pay costs discussed: Not applicable    Additional Information:  SW and CC spoke with Adarsh, listed emergency contact and health care agent.  Adarsh states his father Barron and pt are close friends and neighbors.  Adarsh daily brings meals to his father and pt.  Adarsh states that he CANNOT provide 24/7 supervision for pt, and had never intended to.    Prior to this hospitalization, Adarsh became afraid when he hadn't been able to reach pt for 2 days days, and called police to enter for welfare check.  Adarsh wanted to file Missing Persons report but pt had not been unaccounted for long enough.  Adarsh states that he cares deeply for pt and wants to help him, but does not feel comfortable making decisions about pt going to TCU vs LTC.  Adarsh states he does not want pt to \"get lost in the system\" but that he does not feel comfortable making decisions regarding pt care that pt might not agree with.  Adarsh states he feels bad that pt does not have anyone to help him make big decisions, and that earlier this week when Adarsh spoke to various staff at [facility], Adarsh was not under the impression that pt needed 24 hour supervision, he was told pt would likely have a home care nurse twice weekly which Adarsh could assist with.  Adarsh states that his father Barron is quite elderly, and that Adarsh himself is \"not young\" and cannot possibly provide 24/7 supervision for pt.  CC reviewed PT/OT notes with " Adarsh so Adarsh could understand pt cognitive deficits and the type of support pt would need at home.  Adarsh agrees with TCU referrals being made, but does not have any other input nor does he want to be pt decisionmaker.  ARASH made referrals to local TCUs through [facility].  ADDENDUM: Based on ARASH conversation with hospitalist, SW supervisor has contacted [employer] to change or cancel pt recent document giving health care decision making power to his friends son Trent Hodge.  ARASH does not yet see this document in pt chart, but it was written within the last 48-72 hours.  ARASH will explore the possibility of Trent Hodge possibly agreeing to petition for guardianship.        Merry Marsh, LICSW

## 2021-02-26 NOTE — PROGRESS NOTES
Please disregard patient's Health Care Directive, written on 2/24/21.  Health Care Agent does not want the responsibilities outlined in Health Care Directive.    SW consulted with SW supervisor Jyotsna Moreau, who consulted with Khoi Rubi.  Khoi Rubi states that the process for voiding Health Care Directive is to put in chart note which is now completed.

## 2021-02-26 NOTE — PLAN OF CARE
DATE & TIME: 2/26/21 Nights   Cognitive Concerns/ Orientation: Disoriented to situation. Forgetful   BEHAVIOR & AGGRESSION TOOL COLOR: Green  ABNL VS/O2: VSS on RA.   MOBILITY: SBA, steady gait.  PAIN MANAGMENT:  Denies  DIET: Low saturated fat   BOWEL/BLADDER: urinates in urinal, but dribbles at times   ABNL LAB/BG: No labs since 02/18.   DRAIN/DEVICES: No IV access  TELEMETRY RHYTHM: N/A  SKIN: Bruised/scabs  TESTS/PROCEDURES: None scheduled.   D/C DAY/GOALS/PLACE:  TBD awaiting placement/safe discharge planning, need neighbor to return call. If discharging back to home, will have home care OT and PT   OTHER IMPORTANT INFO:  Impulsive, doesn't utilize call llight. PT, OT following. Stable over night/no complaints.

## 2021-02-27 LAB
LABORATORY COMMENT REPORT: NORMAL
SARS-COV-2 RNA RESP QL NAA+PROBE: NEGATIVE
SPECIMEN SOURCE: NORMAL

## 2021-02-27 PROCEDURE — 99231 SBSQ HOSP IP/OBS SF/LOW 25: CPT | Performed by: INTERNAL MEDICINE

## 2021-02-27 PROCEDURE — 120N000001 HC R&B MED SURG/OB

## 2021-02-27 PROCEDURE — 250N000013 HC RX MED GY IP 250 OP 250 PS 637: Performed by: STUDENT IN AN ORGANIZED HEALTH CARE EDUCATION/TRAINING PROGRAM

## 2021-02-27 PROCEDURE — 87635 SARS-COV-2 COVID-19 AMP PRB: CPT | Performed by: INTERNAL MEDICINE

## 2021-02-27 PROCEDURE — 250N000013 HC RX MED GY IP 250 OP 250 PS 637: Performed by: HOSPITALIST

## 2021-02-27 RX ADMIN — ATORVASTATIN CALCIUM 40 MG: 40 TABLET, FILM COATED ORAL at 19:40

## 2021-02-27 RX ADMIN — ASPIRIN 325 MG ORAL TABLET 325 MG: 325 PILL ORAL at 08:14

## 2021-02-27 ASSESSMENT — ACTIVITIES OF DAILY LIVING (ADL)
ADLS_ACUITY_SCORE: 13
ADLS_ACUITY_SCORE: 17
ADLS_ACUITY_SCORE: 13
ADLS_ACUITY_SCORE: 13

## 2021-02-27 NOTE — PROGRESS NOTES
[facility]  Hospitalist Progress Note  Name: Jose Vazquez    MRN: [id number]  Physician:  Duncan Renteria DO, FHM (Text Page)    Summary of Stay:  Jose Vazquez is a 75-year-old male with a medical history of recent stroke who presented to the emergency department on 2/17/2021 after he was found in the community down after a fall.  Further evaluation has suggested some impairment and a notable SLUMS core of 6/30.  Psych eval earlier in stay felt he was not decisional.     Assessment & Plan    Fall, likely in the setting of physical deconditioning:  Patient not able to give any significant collateral history.  Based on his weakness and PT eval's, likely this was due to physical deconditioning.  -TTE without any valvular abnormalities, telemetry NSR, laboratory work-up negative including TSH, CK, LFTs, troponin.  - tele NSR, discontinued earlier in stay.  - working with therapy     Concern for cognitive impairment vs Acute encephalopathy (post stroke vs TBI vs other)  Concern for vulnerable adult:  Patient previously living independently prior to his recent stroke.  After that he was discharged to a TCU which he left AMA from.  He does not recall the events leading to his AMA discharge.  Although he is oriented at times, significant concerns related to his decision-making ability. Unclear cause for this, likely progressive dementia with recent acute stroke worsening the situation.   This is a difficult situation with patient deemed not decisional on intial evaluation by psych and no next of kin or other surrogate decision makers. Patient states he has a neighbor who helps him, but is clear he has never had a medical power of .  He had wanted his neighbor/neighbors son to possibly be power of  but they ultimately did not feel comfortable with requirements.    Dr Ramos Spoke with neighbor Trent Escobar at [phone] on 2/22/21 . He is the son of neighbor Rasheeda Garcia usually goes  over to Don's house daily to chat and will often spend most of the day there. Trent visits his dad every day and is primary caretaker for him. Trent wonders if Jose could ever come to live with his dad in the future, but would like him to complete a stay at TCU first. Trent did not fully commit to this plan, but seemed open to it. Trent will call Jose on 2/23 to encourage him to work with therapies and accept TCU placement. Discussed with care coordinator. If psych believes him decisional, may be able to appoint Trent as decision maker for major decisions and then arrange discharge to TCU.      -OT for cognitive eval (SLUMS 6/30)  -Social work consult, they will file vulnerable adult  -Psych consult 2/19 felt he was not decisional   -Re-consult psych to assist with level of decision making ability. Patient remains adamant he would like to discharge home, but can't safely weigh risk benefit of this. As he has no next of kin, concern may have to pursue commitment. Patient is not decisional. If he attempts to leave, he is holdable.      Reconsult to Psychiatry 2/2321 indicates per Psychiatry,  felt patient had capacity of self decision making however Psychiatry note indicates that patient elects neighbor Barron as who he would want to assist with medical decisions.  Clarified with  2/24/2021 that patient elects the neighbor's son Trent as healthcare appointee to assist with decisions.  During this encounter with  present patient identified Trent as who he would want to assist with medical decisions   to proceed with documentations regarding these plans.        -See social work note today 2/26/2021, unfortunately Trent Escobar who previously had identified would be willing to take on the role of healthcare appointee to assist in healthcare decisions has decided to revoke that responsibility.  Therefore without next of kin and no other healthcare appointee to assist with  medical decisions may need to proceed with guardianship.  Current healthcare directive for decision making recently filled out revoked as appointee has now declined.    -  Without healthcare appointee if Patient wants to leave the patient would appear to be holdable.         Hematuria:  Noted on urinalysis for many years  -Follow-up with outpatient urology discharge     Concern for UTI earlier in stay:  -Patient denies urinary symptoms  -Urine culture with no organisms  -Discontinued ceftriaxone earlier in stay.       HTN:  -Holding PTA amlodipine as BP controlled without it currently.  Likely discharge off it unless changes before that point.     Subacute left basal ganglia stroke with possible petechial hemorrhage within the above infarct  Chronic-appearing infarct in the inferior medial right cerebellum  Multiple probable chronic microhemorrhages within both cerebral hemispheres and cerebellum  Right carotid stenosis  Presented on 1/14/2021 with speech/language impairment and gait disturbance.  On review of chart patient was then offered TPA, he had declined.   A1c 5.6%, LDL 93. TTE - EF normal, normal sized atria  -Restart PTA aspirin 325 mg oral daily  -outpatient f/u with neurology for carotid stenosis  - Continue statin      THADDEUS, pre-renal, resolved  During admission last month noted to have a creatinine of 1.41 that improved to 1.01.  Have again presents with creatinine of 1.22.  Avoid nephrotoxic medications.   -Last CR WNL.  Eating, taking in fluids.      Chronic obstructive pulmonary disease history  He smoked for over 40 years, a pack a day, but quit a few years ago.  Not requiring any inhalers.  Monitor            COVID Status:  COVID-19 PCR Results    COVID-19 PCR Results 1/14/21 2/17/21 2/27/21   SARS-CoV-2 Virus Specimen Source Nasopharyngeal Nasopharyngeal Nasopharyngeal   SARS-CoV-2 PCR Result NEGATIVE NEGATIVE NEGATIVE      Comments are available for some flowsheets but are not being displayed.          COVID-19 Antibody Results, Testing for Immunity    COVID-19 Antibody Results, Testing for Immunity   No data to display.            Diet: Combination Diet Low Saturated Fat Na <2400mg Diet    DVT Prophylaxis: Pneumatic Compression Devices  Shen Catheter: not present  Code Status: Full Code      Disposition Plan   Have not been able to identify a safe discharge plan.  He appears to need TCU/rehab/additional care.  He also does not appear able to make medical decisions per the repeat psych evals.  SW/Case management working on options.     Entered: Duncan Renteria 02/27/2021, 4:47 PM       Interval History   Assumed care, history reviewed.  Mr. Vazquez frustrated, wants to discharge.  Acknowledges some memory issues but doesn't feel he needs much rehab.  Denies any new physical complaints today.    -Data reviewed today: I reviewed all new labs and imaging reports over the last 24 hours. I personally reviewed no images or EKG's today.    Physical Exam   Temp: 97.5  F (36.4  C) Temp src: Oral BP: 121/82 Pulse: 99   Resp: 18 SpO2: 96 % O2 Device: None (Room air)    Vitals:    02/17/21 2337 02/26/21 1018   Weight: 77.7 kg (171 lb 4.8 oz) 74.3 kg (163 lb 11.2 oz)     Vital Signs with Ranges  Temp:  [97.4  F (36.3  C)-97.8  F (36.6  C)] 97.5  F (36.4  C)  Pulse:  [78-99] 99  Resp:  [18] 18  BP: (104-129)/(68-88) 121/82  SpO2:  [94 %-96 %] 96 %  I/O last 3 completed shifts:  In: 300 [P.O.:300]  Out: -     GEN:  Alert, oriented but confused with some details, appears comfortable, no overt distress.  HEENT:  Normocephalic/atraumatic, no scleral icterus, no nasal discharge, mouth moist.  CV:  Regular rate and rhythm, no loud murmur/rub.  LUNGS:  Clear to auscultation bilaterally without rales/rhonchi/wheezing/retractions.  Symmetric chest rise on inhalation noted.  ABD:  Active bowel sounds, soft, non-distended.  No guarding.  EXT:  No edema.  No cyanosis.  No acute joint synovitis noted.  SKIN:  Dry to touch, no exanthems  noted in the visualized areas.  NEURO:  Follows basic requests, no new deficits noted.    Medications       aspirin  325 mg Oral or NG Tube Daily     atorvastatin  40 mg Oral QPM     Data   No new labs today.      No results found for this or any previous visit (from the past 24 hour(s)).

## 2021-02-27 NOTE — PLAN OF CARE
Cognitive Concerns/ Orientation: A & O x2 disorientated to situation & time. Forgetful.   BEHAVIOR & AGGRESSION TOOL COLOR: Green  ABNL VS/O2: VSS 94% room air   MOBILITY: SBA, steady gait.  PAIN MANAGMENT:  Denies  DIET: Low saturated fat, poor appetite.   BOWEL/BLADDER: Incontinent. Ambulates to BR.  ABNL LAB/BG: No labs since 02/18.   DRAIN/DEVICES: No IV access.  TELEMETRY RHYTHM: N/A  SKIN: Pale. Bruising on legs.   TESTS/PROCEDURES: None scheduled.   D/C DAY/GOALS/PLACE: D, patient has no decision maker

## 2021-02-27 NOTE — PLAN OF CARE
DATE & TIME: 2/26/21 3 to 7 pm  Cognitive Concerns/ Orientation: A & O x2 disorientated to situation & time. Forgetful.   BEHAVIOR & AGGRESSION TOOL COLOR: Green  ABNL VS/O2: BP slightly elevated on RA  MOBILITY: SBA, steady gait.  PAIN MANAGMENT:  Denies  DIET: Low saturated fat, poor appetite.   BOWEL/BLADDER: Incontinent. Ambulates to BR.  ABNL LAB/BG: No labs since 02/18.   DRAIN/DEVICES: No IV access.  TELEMETRY RHYTHM: N/A  SKIN: Pale. Bruising on legs.   TESTS/PROCEDURES: None scheduled.   D/C DAY/GOALS/PLACE: TBD, patient has no decision maker  OTHER IMPORTANT INFO: Slums score is 6/30, Patient concerned about house, Contacted neighbor Adarsh to talk to the patient and give assurance

## 2021-02-27 NOTE — PLAN OF CARE
DATE & TIME: 2/27/2021 7-3 pm   Cognitive Concerns/ Orientation: A & O x2 disorientated to situation & time. Forgetful. Frustrated about situation and being in hospital. But cooperative with writer.   BEHAVIOR & AGGRESSION TOOL COLOR: Green  ABNL VS/O2: VSS on RA.  MOBILITY: SBA, steady gait.   PAIN MANAGMENT:  Denies  DIET: Low saturated fat, poor appetite.   BOWEL/BLADDER: Incontinent. Ambulates to BR.  ABNL LAB/BG: No labs since 02/18.   DRAIN/DEVICES: No IV access.  TELEMETRY RHYTHM: N/A  SKIN: Pale. Bruising on legs.   TESTS/PROCEDURES: None scheduled.   D/C DAY/GOALS/PLACE: TBD, patient has no decision maker.  OTHER IMPORTANT INFO: Pt ambulated hallway x1.

## 2021-02-28 PROCEDURE — 250N000013 HC RX MED GY IP 250 OP 250 PS 637: Performed by: STUDENT IN AN ORGANIZED HEALTH CARE EDUCATION/TRAINING PROGRAM

## 2021-02-28 PROCEDURE — 250N000013 HC RX MED GY IP 250 OP 250 PS 637: Performed by: HOSPITALIST

## 2021-02-28 PROCEDURE — 99231 SBSQ HOSP IP/OBS SF/LOW 25: CPT | Performed by: INTERNAL MEDICINE

## 2021-02-28 PROCEDURE — 120N000001 HC R&B MED SURG/OB

## 2021-02-28 RX ADMIN — ATORVASTATIN CALCIUM 40 MG: 40 TABLET, FILM COATED ORAL at 20:31

## 2021-02-28 RX ADMIN — ASPIRIN 325 MG ORAL TABLET 325 MG: 325 PILL ORAL at 08:06

## 2021-02-28 ASSESSMENT — ACTIVITIES OF DAILY LIVING (ADL)
ADLS_ACUITY_SCORE: 17
ADLS_ACUITY_SCORE: 16

## 2021-02-28 NOTE — PLAN OF CARE
Cognitive Concerns/ Orientation: A&O x2 disorientated to situation & time. Forgetful.   ABNL VS/O2: VSS on RA.  MOBILITY: SBA  PAIN MANAGMENT:  Denies  DIET: Low saturated fat, poor appetite.  BOWEL/BLADDER: Incontinent @ times. Ambulates to BR.  ABNL LAB/BG: N/A  DRAIN/DEVICES: No IV access.  TELEMETRY RHYTHM: N/A  SKIN: Scattered bruising   TESTS/PROCEDURES: None    D/C DAY/GOALS/PLACE: TBD, patient has no decision maker needs TCU placement not safe to go home.

## 2021-02-28 NOTE — PLAN OF CARE
DATE & TIME: 2/27/2021 3-7 pm   Cognitive Concerns/ Orientation: A&O x2 disorientated to situation & time. Forgetful. Frustrated about situation and being in hospital.   ABNL VS/O2: VSS on RA.  MOBILITY: SBA, steady gait.   PAIN MANAGMENT:  Denies  DIET: Low saturated fat, poor appetite.   BOWEL/BLADDER: Incontinent @ times. Ambulates to BR.  ABNL LAB/BG: No labs since 02/18.   DRAIN/DEVICES: No IV access.  TELEMETRY RHYTHM: N/A  SKIN: Pale.   TESTS/PROCEDURES: None scheduled.   D/C DAY/GOALS/PLACE: D, patient has no decision maker.  OTHER IMPORTANT INFO:

## 2021-02-28 NOTE — PLAN OF CARE
DATE & TIME: 2/27/2021 7-11pm   Cognitive Concerns/ Orientation: A&O x2 disorientated to situation & time. Forgetful.   ABNL VS/O2: VSS on RA.  MOBILITY: SBA  PAIN MANAGMENT:  Denies  DIET: Low saturated fat, poor appetite.  BOWEL/BLADDER: Incontinent @ times. Ambulates to BR.  ABNL LAB/BG:   DRAIN/DEVICES: No IV access.  TELEMETRY RHYTHM: N/A  SKIN:   TESTS/PROCEDURES: None    D/C DAY/GOALS/PLACE: TBD, patient has no decision maker needs TCU placement not safe to go home.

## 2021-02-28 NOTE — PROGRESS NOTES
DATE & TIME: 2/28 [id number]    Cognitive Concerns/ Orientation : A&Ox2.   BEHAVIOR & AGGRESSION TOOL COLOR: Green. Flat affect   CIWA SCORE: N/A   ABNL VS/O2: None   MOBILITY: SBA  PAIN MANAGMENT: Denies   DIET: Low fat / Low sodium   BOWEL/BLADDER: Cont   ABNL LAB/BG: None   DRAIN/DEVICES: None   TELEMETRY RHYTHM: N/A   SKIN: Intact   TESTS/PROCEDURES: None   D/C DAY/GOALS/PLACE: TBD   OTHER IMPORTANT INFO: Will he having a repeat OT consult tmr for a cognitive evaluation to decide on safe placement.

## 2021-02-28 NOTE — PROGRESS NOTES
[facility]  Hospitalist Progress Note  Name: Jose Vazquez    MRN: [id number]  Physician:  Duncan Renteria DO, FHM (Text Page)    Summary of Stay:  Jose Vazquez is a 75-year-old male with a medical history of recent stroke who presented to the emergency department on 2/17/2021 after he was found in the community down after a fall.  Further evaluation has suggested some impairment and a notable SLUMS core of 6/30.  Psych eval earlier in stay felt he was not decisional.     Assessment & Plan    Fall, likely in the setting of physical deconditioning:  Patient not able to give any significant collateral history.  Based on his weakness and PT eval's, likely this was due to physical deconditioning.  -TTE without any valvular abnormalities, telemetry NSR, laboratory work-up negative including TSH, CK, LFTs, troponin.  - tele NSR, discontinued earlier in stay.  - working with therapy     Concern for cognitive impairment vs Acute encephalopathy (post stroke vs TBI vs other)  Concern for vulnerable adult:  Patient previously living independently prior to his recent stroke.  After that he was discharged to a TCU which he left AMA from.  He does not recall the events leading to his AMA discharge.  Although he is oriented at times, significant concerns related to his decision-making ability. Unclear cause for this, likely progressive dementia with recent acute stroke worsening the situation.   This is a difficult situation with patient deemed not decisional on intial evaluation by psych and no next of kin or other surrogate decision makers. Patient states he has a neighbor who helps him, but is clear he has never had a medical power of .  He had wanted his neighbor/neighbors son to possibly be power of  but they ultimately did not feel comfortable with requirements.    Dr Ramos Spoke with neighbor Trent Escobar at [phone] on 2/22/21 . He is the son of neighbor Rasheeda Garcia usually goes  over to Don's house daily to chat and will often spend most of the day there. Trent visits his dad every day and is primary caretaker for him. Trent wonders if Jose could ever come to live with his dad in the future, but would like him to complete a stay at TCU first. Trent did not fully commit to this plan, but seemed open to it. Trent will call Jose on 2/23 to encourage him to work with therapies and accept TCU placement. Discussed with care coordinator. If psych believes him decisional, may be able to appoint Trent as decision maker for major decisions and then arrange discharge to TCU.      -OT for cognitive eval (SLUMS 6/30)  -Social work consult, they will file vulnerable adult  -Psych consult 2/19 felt he was not decisional   -Re-consult psych to assist with level of decision making ability. Patient remains adamant he would like to discharge home, but can't safely weigh risk benefit of this. As he has no next of kin, concern may have to pursue commitment. Patient is not decisional. If he attempts to leave, he is holdable.      Reconsult to Psychiatry 2/2321 indicates per Psychiatry,  felt patient had capacity of self decision making however Psychiatry note indicates that patient elects neighbor Barron as who he would want to assist with medical decisions.  Clarified with  2/24/2021 that patient elects the neighbor's son Trent as healthcare appointee to assist with decisions.  During this encounter with  present patient identified Trent as who he would want to assist with medical decisions   to proceed with documentations regarding these plans.        -See social work note today 2/26/2021, unfortunately Trent Escobar who previously had identified would be willing to take on the role of healthcare appointee to assist in healthcare decisions has decided to revoke that responsibility.  Therefore without next of kin and no other healthcare appointee to assist with  medical decisions may need to proceed with guardianship.  Current healthcare directive for decision making recently filled out revoked as appointee has now declined.    -  Without healthcare appointee if Patient wants to leave the patient would appear to be holdable.      Update 2/28/21:  -  Given that he has remained in the hospital receiving therapy (and shown some slow improvement) for longer than initially expected due to ongoing placement/guardianship issues I will ask OT and psych to reassess him tomorrow for competency.     Hematuria:  Noted on urinalysis for many years  -Follow-up with outpatient urology discharge     Concern for UTI earlier in stay:  -Patient denies urinary symptoms  -Urine culture with no organisms  -Discontinued ceftriaxone earlier in stay.       HTN:  -Holding PTA amlodipine as BP controlled without it currently.  Likely discharge off it unless changes before that point.     Subacute left basal ganglia stroke with possible petechial hemorrhage within the above infarct  Chronic-appearing infarct in the inferior medial right cerebellum  Multiple probable chronic microhemorrhages within both cerebral hemispheres and cerebellum  Right carotid stenosis  Presented on 1/14/2021 with speech/language impairment and gait disturbance.  On review of chart patient was then offered TPA, he had declined.   A1c 5.6%, LDL 93. TTE - EF normal, normal sized atria  -Restarted PTA aspirin 325 mg oral daily  -outpatient f/u with neurology for carotid stenosis  - Continue statin      THADDEUS, pre-renal, resolved  During admission last month noted to have a creatinine of 1.41 that improved to 1.01.  Have again presents with creatinine of 1.22.  Avoid nephrotoxic medications.   -Last CR WNL.  Eating, taking in fluids.      Chronic obstructive pulmonary disease history  He smoked for over 40 years, a pack a day, but quit a few years ago.  Not requiring any inhalers.  Monitor            COVID Status:  COVID-19 PCR  Results    COVID-19 PCR Results 1/14/21 2/17/21 2/27/21   SARS-CoV-2 Virus Specimen Source Nasopharyngeal Nasopharyngeal Nasopharyngeal   SARS-CoV-2 PCR Result NEGATIVE NEGATIVE NEGATIVE      Comments are available for some flowsheets but are not being displayed.         COVID-19 Antibody Results, Testing for Immunity    COVID-19 Antibody Results, Testing for Immunity   No data to display.            Diet: Combination Diet Low Saturated Fat Na <2400mg Diet    DVT Prophylaxis: Pneumatic Compression Devices  Shen Catheter: not present  Code Status: Full Code      Disposition Plan   Have not been able to identify a safe discharge plan.  He appears to need TCU/rehab/additional care.  He also does not appear able to make medical decisions per the last evals.  He reports he is agreeable to going to rehab short term but reportedly rehab won't take him unless he is felt able to make his own decisions or has an appointed decision maker.  SW/Case management working on options.     Entered: Duncan Renteria 02/28/2021, 5:02 PM       Interval History   Mr. Vazquez frustrated, hopes to discharge soon.  Denies any new physical complaints today.    -Data reviewed today: I reviewed all new labs and imaging reports over the last 24 hours. I personally reviewed no images or EKG's today.    Physical Exam   Temp: 97.6  F (36.4  C) Temp src: Oral BP: 118/87 Pulse: 84   Resp: 16 SpO2: 94 % O2 Device: None (Room air)    Vitals:    02/17/21 2337 02/26/21 1018   Weight: 77.7 kg (171 lb 4.8 oz) 74.3 kg (163 lb 11.2 oz)     Vital Signs with Ranges  Temp:  [97.2  F (36.2  C)-97.6  F (36.4  C)] 97.6  F (36.4  C)  Pulse:  [77-84] 84  Resp:  [16-18] 16  BP: (118-126)/(87-89) 118/87  SpO2:  [94 %-95 %] 94 %  I/O last 3 completed shifts:  In: 600 [P.O.:600]  Out: -     GEN:  Alert, oriented but confused with some details, appears comfortable, no overt distress.  HEENT:  Normocephalic/atraumatic, no scleral icterus, no nasal discharge, mouth  moist.  CV:  Regular rate and rhythm, no loud murmur/rub.  LUNGS:  Clear to auscultation bilaterally without rales/rhonchi/wheezing/retractions.  Symmetric chest rise on inhalation noted.  ABD:  Active bowel sounds, soft, non-distended.  No guarding.  EXT:  No edema.  No cyanosis.  No acute joint synovitis noted.  SKIN:  Dry to touch, no exanthems noted in the visualized areas.      Medications       aspirin  325 mg Oral or NG Tube Daily     atorvastatin  40 mg Oral QPM     Data   No new labs today.      No results found for this or any previous visit (from the past 24 hour(s)).

## 2021-02-28 NOTE — PROGRESS NOTES
Care Management Follow Up    Length of Stay (days): 10    Expected Discharge Date: 02/26/21(home)     Concerns to be Addressed: discharge planning     Patient plan of care discussed at interdisciplinary rounds: Yes    Anticipated Discharge Disposition: Long Term Care     Anticipated Discharge Services:    Anticipated Discharge DME:      Patient/family educated on Medicare website which has current facility and service quality ratings:    Education Provided on the Discharge Plan:    Patient/Family in Agreement with the Plan: no    Referrals Placed by CM/SW:    Private pay costs discussed: Costs to be determined.    Additional Information:  On 2/27, Dr Renteria reported patient is willing to return to [facility].   Today, writer spoke with Vika at [facility] admissions.  She reports the facility is open to accepting patient back however, she is aware psychiatry on 2/19/21 assessed patient as not being decsional and they cannot accept patient until he has a legal decision maker. The decision maker would sign the admission papers which includes consent for care.   This is a standard for TCU's/LTC facilities.    On 2/23/21 Dr Lee did believe patient had the capacity to appoint a HCA but would need a decision maker to rely upon to make medical related decisions.  Last week, patient did appoint Trent Hodge as his HCA however Mr Hodge decline this role when he learned of the responsibility of making disposition decisions for patient.     Plan  Social Work will speak with Mr Hodge.  Will ask if he would be willing to serve as the petitioner for a guardianship petition.  He would not be the guardian, just the petitioner.            TOÑO AlfonsoSW

## 2021-03-01 ENCOUNTER — APPOINTMENT (OUTPATIENT)
Dept: OCCUPATIONAL THERAPY | Facility: CLINIC | Age: 76
DRG: 070 | End: 2021-03-01
Attending: INTERNAL MEDICINE
Payer: COMMERCIAL

## 2021-03-01 PROCEDURE — 120N000001 HC R&B MED SURG/OB

## 2021-03-01 PROCEDURE — 250N000013 HC RX MED GY IP 250 OP 250 PS 637: Performed by: STUDENT IN AN ORGANIZED HEALTH CARE EDUCATION/TRAINING PROGRAM

## 2021-03-01 PROCEDURE — 97535 SELF CARE MNGMENT TRAINING: CPT | Mod: GO | Performed by: OCCUPATIONAL THERAPIST

## 2021-03-01 PROCEDURE — 99233 SBSQ HOSP IP/OBS HIGH 50: CPT | Performed by: STUDENT IN AN ORGANIZED HEALTH CARE EDUCATION/TRAINING PROGRAM

## 2021-03-01 PROCEDURE — 250N000013 HC RX MED GY IP 250 OP 250 PS 637: Performed by: HOSPITALIST

## 2021-03-01 PROCEDURE — 99233 SBSQ HOSP IP/OBS HIGH 50: CPT | Performed by: PSYCHIATRY & NEUROLOGY

## 2021-03-01 RX ADMIN — ATORVASTATIN CALCIUM 40 MG: 40 TABLET, FILM COATED ORAL at 19:42

## 2021-03-01 RX ADMIN — ASPIRIN 325 MG ORAL TABLET 325 MG: 325 PILL ORAL at 09:39

## 2021-03-01 ASSESSMENT — ACTIVITIES OF DAILY LIVING (ADL)
ADLS_ACUITY_SCORE: 15
ADLS_ACUITY_SCORE: 15
ADLS_ACUITY_SCORE: 13

## 2021-03-01 NOTE — PLAN OF CARE
1 1900  Cognitive Concerns/ Orientation: A&O x2 disorientated to situation & time. Forgetful.   ABNL VS/O2: VSS on RA.  MOBILITY: SBA with 1/walker/belt  PAIN MANAGMENT:  Denies  DIET: Low saturated fat, poor appetite.  BOWEL/BLADDER: Incontinent @ times. Ambulates to BR.  ABNL LAB/BG: N/A  DRAIN/DEVICES: No IV access.  TELEMETRY RHYTHM: N/A  SKIN: Scattered bruising , slight blanchable redness on buttocks. Repositions self  TESTS/PROCEDURES: None    D/C DAY/GOALS/PLACE: TBD, patient has no decision maker needs TCU placement not safe to go home.   OTHER IMPORTANT INFO: OT eval for cognition in am

## 2021-03-01 NOTE — PLAN OF CARE
03- Day shift  Cognitive Concerns/ Orientation: A&O x2 disorientated to situation & time. Forgetful. Irritable this shift after psych visit about 72 hr hold and sitter presence.   ABNL VS/O2: VSS on RA.  MOBILITY: SBA with 1/walker/belt  PAIN MANAGMENT:  Denies  DIET: Low saturated fat, poor appetite. Refused to eat this shift  BOWEL/BLADDER: Continent. Voiding per bathroom  ABNL LAB/BG: N/A  DRAIN/DEVICES: No IV access.  TELEMETRY RHYTHM: N/A  SKIN: Scattered bruising , slight blanchable redness on buttocks. Repositions self  TESTS/PROCEDURES: None    D/C DAY/GOALS/PLACE:Pending, needs slums re evaluation. Needs commitment and eventual guardianship per psych note.   OTHER IMPORTANT INFO:n/a

## 2021-03-01 NOTE — PROGRESS NOTES
[facility]    Medicine Progress Note - Hospitalist Service       Date of Admission:  2/17/2021  Assessment & Plan       Jose Vazquez is a 75-year-old male with a medical history of recent stroke who presented to the emergency department on 2/17/2021 after he was found in the community down after a fall.  Further evaluation has suggested some impairment and a notable SLUMS core of 6/30.  Psych eval earlier in stay felt he was not decisional.         Assessment & Plan      Fall, likely in the setting of physical deconditioning:  Patient not able to give any significant collateral history.  Based on his weakness and PT eval's, likely this was due to physical deconditioning.  -TTE without any valvular abnormalities, telemetry NSR, laboratory work-up negative including TSH, CK, LFTs, troponin.  - tele NSR, discontinued earlier in stay.  - working with therapy     Concern for cognitive impairment vs Acute encephalopathy (post stroke vs TBI vs other)  Concern for vulnerable adult:  Patient previously living independently prior to his recent stroke.  After that he was discharged to a TCU which he left AMA from.  He does not recall the events leading to his AMA discharge.  Although he is oriented at times, significant concerns related to his decision-making ability. Unclear cause for this, likely progressive dementia with recent acute stroke worsening the situation.   This is a difficult situation with patient deemed not decisional on intial evaluation by psych and no next of kin or other surrogate decision makers. Patient states he has a neighbor who helps him, but is clear he has never had a medical power of .  He had wanted his neighbor/neighbors son to possibly be power of  but they ultimately did not feel comfortable with requirements.     Dr Henderson Spoke with neighbor Trent Escobar at [phone] on 2/22/21 . He is the son of neighbor Barron. Jose usually goes over to [place]'s house  daily to chat and will often spend most of the day there. Trent visits his dad every day and is primary caretaker for him. Trent wonders if Jose could ever come to live with his dad in the future, but would like him to complete a stay at TCU first. Trent did not fully commit to this plan, but seemed open to it. Trent will call Jose on 2/23 to encourage him to work with therapies and accept TCU placement. Discussed with care coordinator. If psych believes him decisional, may be able to appoint Trent as decision maker for major decisions and then arrange discharge to TCU.       -OT for cognitive eval (SLUMS 6/30)  -Social work consult, they will file vulnerable adult  -Psych consult 2/19 felt he was not decisional   -Re-consult psych to assist with level of decision making ability. Patient remains adamant he would like to discharge home, but can't safely weigh risk benefit of this. As he has no next of kin, concern may have to pursue commitment. Patient is not decisional. If he attempts to leave, he is holdable.      Reconsult to Psychiatry 2/2321 indicates per Psychiatry,  felt patient had capacity of self decision making however Psychiatry note indicates that patient elects neighbor Barron as who he would want to assist with medical decisions.  Clarified with  2/24/2021 that patient elects the neighbor's son Trent as healthcare appointee to assist with decisions.  During this encounter with  present patient identified Trent as who he would want to assist with medical decisions   to proceed with documentations regarding these plans.        -See social work note today 2/26/2021, unfortunately Trent Escobar who previously had identified would be willing to take on the role of healthcare appointee to assist in healthcare decisions has decided to revoke that responsibility.  Therefore without next of kin and no other healthcare appointee to assist with medical decisions  may need to proceed with guardianship.  Current healthcare directive for decision making recently filled out revoked as appointee has now declined.    -  Without healthcare appointee if Patient wants to leave the patient would appear to be holdable.       Update 2/28/21:  -  Given that he has remained in the hospital receiving therapy (and shown some slow improvement) for longer than initially expected due to ongoing placement/guardianship issues I will ask OT and psych to reassess him tomorrow for competency.     3/1/21  Placed on hold by Dr. Toledo who plans to pursue commitment. Patient refusing to work with therapies.    Hematuria:  Noted on urinalysis for many years  -Follow-up with outpatient urology discharge     Concern for UTI earlier in stay:  -Patient denies urinary symptoms  -Urine culture with no organisms  -Discontinued ceftriaxone earlier in stay.       HTN:  -Holding PTA amlodipine as BP controlled without it currently.  Likely discharge off it unless changes before that point.     Subacute left basal ganglia stroke with possible petechial hemorrhage within the above infarct  Chronic-appearing infarct in the inferior medial right cerebellum  Multiple probable chronic microhemorrhages within both cerebral hemispheres and cerebellum  Right carotid stenosis  Presented on 1/14/2021 with speech/language impairment and gait disturbance.  On review of chart patient was then offered TPA, he had declined.   A1c 5.6%, LDL 93. TTE - EF normal, normal sized atria  -Restarted PTA aspirin 325 mg oral daily  -outpatient f/u with neurology for carotid stenosis  - Continue statin      THADDEUS, pre-renal, resolved  During admission last month noted to have a creatinine of 1.41 that improved to 1.01.  Have again presents with creatinine of 1.22.  Avoid nephrotoxic medications.   -Last CR WNL.  Eating, taking in fluids.      Chronic obstructive pulmonary disease history  He smoked for over 40 years, a pack a day, but  "quit a few years ago.  Not requiring any inhalers.  Monitor        Diet: Combination Diet Low Saturated Fat Na <2400mg Diet    DVT Prophylaxis: Ambulate every shift  Shen Catheter: not present  Code Status: Full Code           Disposition Plan   Expected discharge: > 7 days, recommended to TBD once safe disposition plan/ TCU bed available.  Entered: Tyler Henderson MD 03/01/2021, 3:48 PM       The patient's care was discussed with the Bedside Nurse, Care Coordinator/ and Patient.    Time Spent on this Encounter   I spent 40 minutes on the unit/floor managing the care of Jose Vazquez. Over 50% of my time was spent on the following:   - Counseling the patient and/or family regarding: recommended follow-up and medical compliance  - Coordination of care with the: care coordinator/ and nurse    MD Tyler Galan MD  Hospitalist Service  [facility]  Contact information available via [facility] Paging/Directory    ______________________________________________________________________    Interval History   Patient placed on hold by psych this AM. Patient very frustrated by this but can't fully elucidate why. Knows that he is being \"kept\" here. Thinks this is all \"bullshit\".    Data reviewed today: I reviewed all medications, new labs and imaging results over the last 24 hours. I personally reviewed no images or EKG's today.    Physical Exam   Vital Signs: Temp: 97.6  F (36.4  C) Temp src: Oral BP: (!) 135/95 Pulse: 84   Resp: 18 SpO2: 96 % O2 Device: None (Room air)    Weight: 163 lbs 11.2 oz  GEN:  Alert, oriented but confused with some details, appears comfortable, no overt distress.  HEENT:  Normocephalic/atraumatic, no scleral icterus, no nasal discharge, mouth moist.  CV:  Regular rate and rhythm, no loud murmur/rub.  LUNGS:  Clear to auscultation bilaterally without rales/rhonchi/wheezing/retractions.  Symmetric chest rise on inhalation " noted.  ABD:  Active bowel sounds, soft, non-distended.  No guarding.  EXT:  No edema.  No cyanosis.  No acute joint synovitis noted.  SKIN:  Dry to touch, no exanthems noted in the visualized areas.    Data   No lab results found in last 7 days.    No results found for this or any previous visit (from the past 24 hour(s)).  Medications       aspirin  325 mg Oral or NG Tube Daily     atorvastatin  40 mg Oral QPM

## 2021-03-01 NOTE — PLAN OF CARE
Cognitive Concerns/ Orientation: A&O x2 disorientated to situation & time. Forgetful.   ABNL VS/O2: VSS on RA.  MOBILITY: SBA with 1/walker/belt  PAIN MANAGMENT:  Denies  DIET: Low saturated fat, poor appetite.  BOWEL/BLADDER: Incontinent @ times. Ambulates to BR.  ABNL LAB/BG: N/A  DRAIN/DEVICES: No IV access.  TELEMETRY RHYTHM: N/A  SKIN: Scattered bruising , slight blanchable redness on buttocks. Repositions self  TESTS/PROCEDURES: None    D/C DAY/GOALS/PLACE: TBD, patient has no decision maker needs TCU placement not safe to go home.   OTHER IMPORTANT INFO: OT eligio for cognition in am

## 2021-03-01 NOTE — PROGRESS NOTES
Care Management Follow Up    Length of Stay (days): 11    Expected Discharge Date: 02/26/21(home)     Concerns to be Addressed: discharge planning     Patient plan of care discussed at interdisciplinary rounds: Yes    Anticipated Discharge Disposition: Long Term Care     Anticipated Discharge Services:    Anticipated Discharge DME:      Patient/family educated on Medicare website which has current facility and service quality ratings:    Education Provided on the Discharge Plan:    Patient/Family in Agreement with the Plan: no    Referrals Placed by CM/SW:    Private pay costs discussed:     Additional Information:  Dr Toledo's note reviewed.  Commitment does not assist in discharge planning to [facility] or [facility] as the faciities will not accept patient without a legal decision maker.   However the commitment process is necessary in order to keep patient in a safe setting until a guardianship hearing can occur.  We are awaiting the cognitive assessment today to determine if patient can demonstrate improve cognition since the last test.         Heather Walker, TOÑOSW

## 2021-03-01 NOTE — CONSULTS
"[facility] Psychiatric Consult Progress Note    Interval History:   Pt seen, chart reviewed, case discussed with nursing staff and treating clinicians.  I met with Jose on station 66.  He knows that it is Monday, he was able to tell me that it was March 1, mentions he has been in the hospital \"for 3 weeks\".  He is able to tell me that he fell, though he is currently focused on wanting to leave because in his words \"you are holding me against my will\".  Notes in the chart reflect that there has been concern about his decisional capacity, need for guardianship.  He is a bit irritable with me, not overtly delusional, not making any threats to self or others.  A previous slums score earlier in the hospitalization was 6 out of 30.  He flatly denies any appreciation that he needs further supervision, indicated to me that he plans to drive, does not see any need to be in a transitional care unit or have 24-hour supervision.  Currently there does not appear to be anyone willing to be his substitute decision-maker though he has a neighbor by the name of Barron.     Review of systems:   10 point Review of Systems completed by Dr. Toledo, and is  is negative other than noted in the HPI     Medications:       aspirin  325 mg Oral or NG Tube Daily     atorvastatin  40 mg Oral QPM     acetaminophen, acetaminophen, bisacodyl, hydrALAZINE, melatonin, ondansetron **OR** ondansetron, polyethylene glycol, sodium chloride (PF), sodium chloride (PF)    Mental Status Examination:     Appearance:  awake, alert, adequately groomed, dressed in hospital scrubs and appeared as age stated  Eye Contact:  intense  Speech:  clear, coherent  Language:Normal  Psychomotor Behavior:  no evidence of tardive dyskinesia, dystonia, or tics  Mood:  Irritable  Affect:  intensity is flat  Thought Process:  logical, linear and goal oriented no loose associations  Thought Content:  no evidence of suicidal ideation or homicidal ideation and " no evidence of psychotic thought  Oriented to:  time, person, and place  Attention Span and Concentration:  fair  Recent and Remote Memory:  poor  Fund of Knowledge: appropriate  Muscle Strength and Tone: normal  Gait and Station: Normal  Insight:  limited  Judgment:  poor        Labs/Vitals:   No results found for this or any previous visit (from the past 24 hour(s)).  B/P: 130/99, T: 97.3, P: 100, R: 18    Impression:   Jose is a 75-year-old gentleman status post cerebrovascular accident and fall who continues to demonstrate limited decisional capacity.  He has little recollection about what brought him to the hospital, insists that he wants to leave.  At this juncture a 72-hour hold should be placed.  It might be appropriate to repeat a slums evaluation, to see exactly where his cognition is at.  He obviously has some limitations and his orientation may be fluctuating.      DIagnoses:   1.  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  Recent fall with cerebral contusion  3.  COPD  4.  Hypertension  5.  Chronic kidney disease         Plan:   1.  At this juncture I do not think the patient is decisional, there may be some improvement in his cognition but he has limited capacity to appreciate his current circumstances and need for additional supervision   2.  Initiate 72-hour hold this patient is demanding to leave  3.  Initiate commitment process, there is no viable option for emergency guardianship within [facility] and the commitment process can be used as a bridge to get him to a safe setting where there is 24-hour supervision  4.  If a slums evaluation has not been done within the last 7 to 10 days this should be repeated together further information.  Ideally neuropsychological assessment would be offered but I do not think this is feasible under the current Covid restrictions      Attestation:  Patient has been seen and evaluated by me,  Gentry Toledo MD

## 2021-03-02 ENCOUNTER — APPOINTMENT (OUTPATIENT)
Dept: PHYSICAL THERAPY | Facility: CLINIC | Age: 76
DRG: 070 | End: 2021-03-02
Payer: COMMERCIAL

## 2021-03-02 PROCEDURE — 250N000013 HC RX MED GY IP 250 OP 250 PS 637: Performed by: HOSPITALIST

## 2021-03-02 PROCEDURE — 120N000001 HC R&B MED SURG/OB

## 2021-03-02 PROCEDURE — 97530 THERAPEUTIC ACTIVITIES: CPT | Mod: GP | Performed by: PHYSICAL THERAPIST

## 2021-03-02 PROCEDURE — 250N000013 HC RX MED GY IP 250 OP 250 PS 637: Performed by: STUDENT IN AN ORGANIZED HEALTH CARE EDUCATION/TRAINING PROGRAM

## 2021-03-02 PROCEDURE — 97116 GAIT TRAINING THERAPY: CPT | Mod: GP | Performed by: PHYSICAL THERAPIST

## 2021-03-02 PROCEDURE — 99231 SBSQ HOSP IP/OBS SF/LOW 25: CPT | Performed by: STUDENT IN AN ORGANIZED HEALTH CARE EDUCATION/TRAINING PROGRAM

## 2021-03-02 RX ADMIN — ASPIRIN 325 MG ORAL TABLET 325 MG: 325 PILL ORAL at 08:49

## 2021-03-02 RX ADMIN — ATORVASTATIN CALCIUM 40 MG: 40 TABLET, FILM COATED ORAL at 19:43

## 2021-03-02 ASSESSMENT — ACTIVITIES OF DAILY LIVING (ADL)
ADLS_ACUITY_SCORE: 15

## 2021-03-02 NOTE — PROGRESS NOTES
[facility]    Medicine Progress Note - Hospitalist Service       Date of Admission:  2/17/2021  Assessment & Plan       Jose Vazquez is a 75-year-old male with a medical history of recent stroke who presented to the emergency department on 2/17/2021 after he was found in the community down after a fall.  Further evaluation has suggested some impairment and a notable SLUMS core of 6/30.  Psych eval earlier in stay felt he was not decisional.         Assessment & Plan      Fall, likely in the setting of physical deconditioning:  Patient not able to give any significant collateral history.  Based on his weakness and PT eval's, likely this was due to physical deconditioning.  -TTE without any valvular abnormalities, telemetry NSR, laboratory work-up negative including TSH, CK, LFTs, troponin.  - tele NSR, discontinued earlier in stay.  - working with therapy     Concern for cognitive impairment vs Acute encephalopathy (post stroke vs TBI vs other)  Concern for vulnerable adult:  Patient previously living independently prior to his recent stroke.  After that he was discharged to a TCU which he left AMA from.  He does not recall the events leading to his AMA discharge.  Although he is oriented at times, significant concerns related to his decision-making ability. Unclear cause for this, likely progressive dementia with recent acute stroke worsening the situation.   This is a difficult situation with patient deemed not decisional on intial evaluation by psych and no next of kin or other surrogate decision makers. Patient states he has a neighbor who helps him, but is clear he has never had a medical power of .  He had wanted his neighbor/neighbors son to possibly be power of  but they ultimately did not feel comfortable with requirements.     Dr Henderson Spoke with neighbor Trent Escobar at [phone] on 2/22/21 . He is the son of neighbor Barron. Jose usually goes over to [employer]'s house  daily to chat and will often spend most of the day there. Trent visits his dad every day and is primary caretaker for him. Trent wonders if Jose could ever come to live with his dad in the future, but would like him to complete a stay at TCU first. Trent did not fully commit to this plan, but seemed open to it. Trent will call Jose on 2/23 to encourage him to work with therapies and accept TCU placement. Discussed with care coordinator. If psych believes him decisional, may be able to appoint Trent as decision maker for major decisions and then arrange discharge to TCU.       Reconsult to Psychiatry 2/2321 indicates per Psychiatry,  felt patient had capacity of self decision making however Psychiatry note indicates that patient elects neighbor Barron as who he would want to assist with medical decisions.  Clarified with  2/24/2021 that patient elects the neighbor's son Trent as healthcare appointee to assist with decisions.  During this encounter with  present patient identified Trent as who he would want to assist with medical decisions   to proceed with documentations regarding these plans.        -See social work note today 2/26/2021, unfortunately Trent Escobar who previously had identified would be willing to take on the role of healthcare appointee to assist in healthcare decisions has decided to revoke that responsibility.  Therefore without next of kin and no other healthcare appointee to assist with medical decisions may need to proceed with guardianship.  Current healthcare directive for decision making recently filled out revoked as appointee has now declined.         -OT for cognitive eval (SLUMS 6/30 on Feb 19 and 10/30 on March 1)  -Social work consult, they will file vulnerable adult  -Psych consult 2/19 felt he was not decisional   -Psych consult on 3/1 opted to hold patient and pursue commitment and subsequent guardianship    Hematuria:  Noted on  urinalysis for many years  -Follow-up with outpatient urology discharge     Concern for UTI earlier in stay:  -Patient denies urinary symptoms  -Urine culture with no organisms  -Discontinued ceftriaxone earlier in stay.       HTN:  -Holding PTA amlodipine as BP controlled without it currently.  Likely discharge off it unless changes before that point.     Subacute left basal ganglia stroke with possible petechial hemorrhage within the above infarct  Chronic-appearing infarct in the inferior medial right cerebellum  Multiple probable chronic microhemorrhages within both cerebral hemispheres and cerebellum  Right carotid stenosis  Presented on 1/14/2021 with speech/language impairment and gait disturbance.  On review of chart patient was then offered TPA, he had declined.   A1c 5.6%, LDL 93. TTE - EF normal, normal sized atria  -Restarted PTA aspirin 325 mg oral daily  -outpatient f/u with neurology for carotid stenosis  - Continue statin      THADDEUS, pre-renal, resolved  During admission last month noted to have a creatinine of 1.41 that improved to 1.01.  Have again presents with creatinine of 1.22.  Avoid nephrotoxic medications.   -Last CR WNL.  Eating, taking in fluids.      Chronic obstructive pulmonary disease history  He smoked for over 40 years, a pack a day, but quit a few years ago.  Not requiring any inhalers.  Monitor        Diet: Combination Diet Low Saturated Fat Na <2400mg Diet    DVT Prophylaxis: Ambulate every shift  Shen Catheter: not present  Code Status: Full Code           Disposition Plan   Expected discharge: > 7 days, recommended to TBD once safe disposition plan/ TCU bed available.  Entered: Tyler Henderson MD 03/02/2021, 3:04 PM       The patient's care was discussed with the Bedside Nurse, Care Coordinator/ and Patient.    Tyler Henderson MD  Hospitalist Service  [facility]  Contact information available via [facility]  Paging/Directory    ______________________________________________________________________    Interval History   Patient continues to be frustrated. Declines to talk to me much today.    Data reviewed today: I reviewed all medications, new labs and imaging results over the last 24 hours. I personally reviewed no images or EKG's today.    Physical Exam   Vital Signs: Temp: 97.6  F (36.4  C) Temp src: Oral BP: 133/56 Pulse: 91   Resp: 18 SpO2: 95 % O2 Device: None (Room air)    Weight: 163 lbs 11.2 oz  GEN:  Alert, oriented but confused with some details, appears comfortable, no overt distress.  HEENT:  Normocephalic/atraumatic, no scleral icterus, no nasal discharge, mouth moist.  EXT:  No edema.  No cyanosis.  No acute joint synovitis noted.  SKIN:  Dry to touch, no exanthems noted in the visualized areas.    Data   No lab results found in last 7 days.    No results found for this or any previous visit (from the past 24 hour(s)).  Medications       aspirin  325 mg Oral or NG Tube Daily     atorvastatin  40 mg Oral QPM

## 2021-03-02 NOTE — PLAN OF CARE
DATE & TIME: 3/1/21-3/2/21 7294-6178  Cognitive Concerns/ Orientation: A&O x2 disorientated to situation & time. Forgetful. Calm/cooperative.   ABNL VS/O2: VSS on RA  MOBILITY: SBA, refuses GB and walker  PAIN MANAGMENT: Denies  DIET: Low saturated fat, denies nausea.  BOWEL/BLADDER: Continent. Voiding per bathroom.   ABNL LAB/BG: N/A  DRAIN/DEVICES: No IV access.  TELEMETRY RHYTHM: N/A  SKIN: Scattered bruising, slight blanchable redness on buttocks. Repositions self.  TESTS/PROCEDURES: None    D/C DAY/GOALS/PLACE: Pending, needs slums re-evaluation, commitment and eventual guardianship per psych note.   OTHER IMPORTANT INFO: 72 hour hold started 3/1/21, sitter at bedside.

## 2021-03-02 NOTE — PLAN OF CARE
OT: order for re-eval of SLUMS received, and evaluation completed yesterday 3/1.  Patient scored 10/30. OT noted from yesterday copied:    OT: Pt seated on EOB, sitter in room.  Pt cooperative with activities.  Scored a 10/30 on the SLUMS cognitive screening test.  This is slightly better than his score on 2/19 when he scored a 6/30.  However it is still well below the threshold as measured by this test to be living safely alone at home on his own.  Pt did know the 911 emergency number when asked but the used it to respond inappropriately to 3 other household situations.  Does not appear to have insight to carefully evaluate a potentially dangerous situation and respond to it.  Would continue to recommend 24/7 supervision in his discharge setting, not safe to return home unsupervised.

## 2021-03-02 NOTE — PROGRESS NOTES
Care Management Follow Up    Length of Stay (days): 12    Expected Discharge Date: 03/08/21     Concerns to be Addressed: discharge planning     Patient plan of care discussed at interdisciplinary rounds: Yes    Anticipated Discharge Disposition: Long Term Care     Anticipated Discharge Services:    Anticipated Discharge DME:      Patient/family educated on Medicare website which has current facility and service quality ratings:    Education Provided on the Discharge Plan:    Patient/Family in Agreement with the Plan: no    Referrals Placed by CM/SW:    Private pay costs discussed: Costs and patient's ability to pay needs to be determined.    Additional Information:  Petition and Exhibit A completed today and faxed to Amarilys Mcmahon at [facility] Pre-petition Screening [phone].  Also spoke with Ms Mcmahon.  She has completed her interview with patient.  She will staff the case tomorrow morning and inform writer if the PPS staff will support the hospital petition.    Writer did call patient's friend Trent Hodge and left a message requesting a call back.  The purpose of the call was to ask if he would like to offer any collateral information to Ms Mcmahon however did not receive a call back.       TOÑO AlfonsoSW

## 2021-03-02 NOTE — PLAN OF CARE
DATE & TIME: 03/02/2021; 7844-3167  Cognitive Concerns/ Orientation: A&O x3 refused to answer situation. Forgetful. Calm/cooperative.   ABNL VS/O2: VSS on RA  MOBILITY: SBA, refuses GB and walker  PAIN MANAGMENT: Denies  DIET: Low saturated fat  BOWEL/BLADDER: Continent. Up to bathroom.   ABNL LAB/BG: N/A  DRAIN/DEVICES: No IV access.  TELEMETRY RHYTHM: N/A  SKIN: Scattered bruising, slight blanchable redness on buttocks. Repositions self.  TESTS/PROCEDURES: None   D/C DAY/GOALS/PLACE: Pending, [facility] following, SLUMS scores: 6, 10 (yesterday), commitment and eventual guardianship per psych note.   OTHER IMPORTANT INFO: 72 hour hold started 3/1/21, sitter at bedside. Encouraged to ambulate throughout shift, disinterested.

## 2021-03-02 NOTE — PLAN OF CARE
DATE & TIME: 3/1/21 0672-0012  Cognitive Concerns/ Orientation: A&O x2 disorientated to situation & time. Forgetful. Calm/cooperative.   ABNL VS/O2: VSS on RA  MOBILITY: SBA, with 1/walker/belt, refuses GB/W   PAIN MANAGMENT: Denies  DIET: Low saturated fat, ate dinner.   BOWEL/BLADDER: Continent. Voiding per bathroom.   ABNL LAB/BG: N/A  DRAIN/DEVICES: No IV access.  TELEMETRY RHYTHM: N/A  SKIN: Scattered bruising, slight blanchable redness on buttocks. Repositions self.  TESTS/PROCEDURES: None    D/C DAY/GOALS/PLACE: Pending, needs slums re-evaluation, commitment and eventual guardianship per psych note.   OTHER IMPORTANT INFO: 72 hour hold started this AM, sitter at bedside.

## 2021-03-03 ENCOUNTER — APPOINTMENT (OUTPATIENT)
Dept: PHYSICAL THERAPY | Facility: CLINIC | Age: 76
DRG: 070 | End: 2021-03-03
Payer: COMMERCIAL

## 2021-03-03 ENCOUNTER — APPOINTMENT (OUTPATIENT)
Dept: OCCUPATIONAL THERAPY | Facility: CLINIC | Age: 76
DRG: 070 | End: 2021-03-03
Payer: COMMERCIAL

## 2021-03-03 PROCEDURE — 250N000013 HC RX MED GY IP 250 OP 250 PS 637: Performed by: HOSPITALIST

## 2021-03-03 PROCEDURE — 97535 SELF CARE MNGMENT TRAINING: CPT | Mod: GO | Performed by: OCCUPATIONAL THERAPIST

## 2021-03-03 PROCEDURE — 120N000001 HC R&B MED SURG/OB

## 2021-03-03 PROCEDURE — 97116 GAIT TRAINING THERAPY: CPT | Mod: GP

## 2021-03-03 PROCEDURE — 99232 SBSQ HOSP IP/OBS MODERATE 35: CPT | Performed by: INTERNAL MEDICINE

## 2021-03-03 PROCEDURE — 97112 NEUROMUSCULAR REEDUCATION: CPT | Mod: GP

## 2021-03-03 PROCEDURE — 250N000013 HC RX MED GY IP 250 OP 250 PS 637: Performed by: STUDENT IN AN ORGANIZED HEALTH CARE EDUCATION/TRAINING PROGRAM

## 2021-03-03 RX ADMIN — ASPIRIN 325 MG ORAL TABLET 325 MG: 325 PILL ORAL at 08:58

## 2021-03-03 RX ADMIN — ATORVASTATIN CALCIUM 40 MG: 40 TABLET, FILM COATED ORAL at 20:28

## 2021-03-03 ASSESSMENT — ACTIVITIES OF DAILY LIVING (ADL)
ADLS_ACUITY_SCORE: 13
ADLS_ACUITY_SCORE: 13
ADLS_ACUITY_SCORE: 15
ADLS_ACUITY_SCORE: 13
ADLS_ACUITY_SCORE: 15
ADLS_ACUITY_SCORE: 15

## 2021-03-03 NOTE — PLAN OF CARE
Cognitive Concerns/ Orientation: A&Ox2-3 this shift. Forgetful. withdrawn  ABNL VS/O2: VSS on RA  MOBILITY: SBA, refuses GB and walker  PAIN MANAGMENT: Denies pain  DIET: Low saturated fat-good appetite  BOWEL/BLADDER: Continent. Up to bathroom.   ABNL LAB/BG: N/A  DRAIN/DEVICES: No IV access.  TELEMETRY RHYTHM: N/A  SKIN: Scattered bruising.   TESTS/PROCEDURES: N/A   D/C DAY/GOALS/PLACE: Pending discharge.  Commitment and eventual guardianship per psych note.   OTHER IMPORTANT INFO: 72 hour hold placed 3/1/21, sitter at bedside. Ambulation encouraged in the halls several times. Declined a shower

## 2021-03-03 NOTE — PROGRESS NOTES
CLINICAL NUTRITION SERVICES - REASSESSMENT NOTE      Recommendations Ordered by Registered Dietitian (RD):   Ordered reweight   Malnutrition:  % Weight Loss:  Weight loss does not meet criteria for malnutrition   % Intake:  No decreased intake noted  Subcutaneous Fat Loss:  None observed  Muscle Loss:  None observed  Fluid Retention:  None noted    Malnutrition Diagnosis: Patient does not meet two of the above criteria necessary for diagnosing malnutrition       EVALUATION OF PROGRESS TOWARD GOALS   Diet: Low SF, 2 g Na    Intake/Tolerance: Good appetite per RN note 3/3. Has been receiving 1-3 meals per day, averaging 1178 kcals and 53 g protein, refuses to eat some meals. RN stated that pt was not too happy about his diet restrictions.   100% intakes documented in flow sheets and pt reports a good appetite and states he has been eating all of his meals. Pt declined any ONS at this time.      NEW FINDINGS:   Sitter at bedside   Last BM 3/2  Labs: labs reviewed  Meds: PRN Dulcolax, Zofran, Miralax  Weights: 4.4% weight loss in 9 days  Wt Readings from Last 10 Encounters:   02/26/21 74.3 kg (163 lb 11.2 oz)   02/17/21 77.7 kg (171 lb 4.8 oz)   01/19/21 79 kg (174 lb 3.2 oz)       Previous Goals:   N/A  Evaluation: N/A    Previous Nutrition Diagnosis:   No nutrition diagnosis at this time.  Evaluation: No change      MALNUTRITION  % Weight Loss:  Weight loss does not meet criteria for malnutrition   % Intake:  No decreased intake noted  Subcutaneous Fat Loss:  None observed  Muscle Loss:  None observed  Fluid Retention:  None noted    Malnutrition Diagnosis: Patient does not meet two of the above criteria necessary for diagnosing malnutrition      CURRENT NUTRITION DIAGNOSIS  No nutrition diagnosis at this time.    INTERVENTIONS  Recommendations / Nutrition Prescription  Continue current diet  Ordered reweight    Implementation  None new at this time    Goals  Pt to consume >75% nutritionally adequate meals  TINERI.      MONITORING AND EVALUATION:  Progress towards goals will be monitored and evaluated per protocol and Practice Guidelines    Ileana Arias  Dietetic Intern

## 2021-03-03 NOTE — PLAN OF CARE
DATE & TIME: 03/02/2021 [id number]  Cognitive Concerns/ Orientation: A&Ox4 this shift. Forgetful. Calm/cooperative.   ABNL VS/O2: VSS on RA  MOBILITY: SBA, refuses GB and walker  PAIN MANAGMENT: Denies  DIET: Low saturated fat  BOWEL/BLADDER: Continent. Up to bathroom.   ABNL LAB/BG: N/A  DRAIN/DEVICES: No IV access.  TELEMETRY RHYTHM: N/A  SKIN: Scattered bruising. Repositions self in bed.   TESTS/PROCEDURES: N/A   D/C DAY/GOALS/PLACE: Pending discharge.  Commitment and eventual guardianship per psych note.   OTHER IMPORTANT INFO: 72 hour hold placed 3/1/21, sitter at bedside. Ambulation encouraged throughout shift.

## 2021-03-03 NOTE — PLAN OF CARE
DATE & TIME: 03/02/21-3/3/21 [id number]  Cognitive Concerns/ Orientation: A&Ox3 this shift. Forgetful. Calm/cooperative.   ABNL VS/O2: VSS on RA  MOBILITY: SBA, refuses GB and walker  PAIN MANAGMENT: Denies  DIET: Low saturated fat  BOWEL/BLADDER: Continent. Up to bathroom.   ABNL LAB/BG: N/A  DRAIN/DEVICES: No IV access.  TELEMETRY RHYTHM: N/A  SKIN: Scattered bruising. Repositions self in bed.   TESTS/PROCEDURES: N/A   D/C DAY/GOALS/PLACE: Pending discharge.  Commitment and eventual guardianship per psych note.   OTHER IMPORTANT INFO: 72 hour hold placed 3/1/21, sitter at bedside. Ambulation encouraged throughout shift. No changes from previous shift.

## 2021-03-03 NOTE — PROGRESS NOTES
[facility]    Medicine Progress Note - Hospitalist Service       Date of Admission:  2/17/2021    Assessment & Plan       Jose Vazquez is a 75-year-old male with a medical history of recent stroke who presented to the emergency department on 2/17/2021 after he was found in the community down after a fall.  Further evaluation has suggested some impairment and a notable SLUMS core of 6/30.  Psych eval earlier in stay felt he is not decisional.         Assessment & Plan      Fall, likely in the setting of physical deconditioning:  Patient not able to give any significant collateral history.  Based on his weakness and PT eval's, likely this was due to physical deconditioning. Overall improve at this time, continue PT and OT.   -TTE without any valvular abnormalities, telemetry NSR, laboratory work-up negative including TSH, CK, LFTs, troponin.  - tele NSR, discontinued earlier in stay.  - working with therapy     Concern for cognitive impairment vs Acute encephalopathy (post stroke vs TBI vs other)  Concern for vulnerable adult:  Patient previously living independently prior to his recent stroke.  After that he was discharged to a TCU which he left AMA from.  He does not recall the events leading to his AMA discharge.  Although he is oriented at times, significant concerns related to his decision-making ability. Unclear cause for this, likely progressive dementia with recent acute stroke worsening the situation.   This is a difficult situation with patient deemed not decisional on intial evaluation by psych and no next of kin or other surrogate decision makers. Patient states he has a neighbor who helps him, but is clear he has never had a medical power of .  He had wanted his neighbor/neighbors son to possibly be power of  but they ultimately did not feel comfortable with requirements.     Dr Henderson Spoke with neighbor Trent Escobar at [phone] on 2/22/21 . He is the son of  neighbor Barron. Jose usually goes over to Don's house daily to chat and will often spend most of the day there. Trent visits his dad every day and is primary caretaker for him. Trent wonders if Jose could ever come to live with his dad in the future, but would like him to complete a stay at TCU first. Trent did not fully commit to this plan, but seemed open to it. Trent will call Jose on 2/23 to encourage him to work with therapies and accept TCU placement. Discussed with care coordinator. If psych believes him decisional, may be able to appoint rTent as decision maker for major decisions and then arrange discharge to TCU.       Most recent Psych evaluation on 3/1, as per assessment patient is not decisional, there may be some improvement in his cognition but he has limited capacity to appreciate his current circumstances. Can initiate hold if trying to leave.        -See social work note on 2/26/2021, unfortunately Trent Escobar(neighbor) who previously had identified would be willing to take on the role of healthcare appointee to assist in healthcare decisions has decided to revoke that responsibility.  Therefore without next of kin and no other healthcare appointee to assist with medical decisions may need to proceed with guardianship.  Current healthcare directive for decision making recently filled out revoked as appointee has now declined.         -OT for cognitive eval (SLUMS 6/30 on Feb 19 and 10/30 on March 1)  -Social work consult, they will file vulnerable adult  -Psych consult on 3/1 opted to hold patient and pursue commitment and subsequent guardianship    Hematuria:  Noted on urinalysis for many years  -Follow-up with outpatient urology discharge     Concern for UTI earlier in stay:  -Patient denies urinary symptoms  -Urine culture with no organisms  -Discontinued ceftriaxone earlier in stay.       HTN:  -Holding PTA amlodipine as BP controlled without it currently.  Likely discharge off it unless  changes before that point.     Subacute left basal ganglia stroke with possible petechial hemorrhage within the above infarct  Chronic-appearing infarct in the inferior medial right cerebellum  Multiple probable chronic microhemorrhages within both cerebral hemispheres and cerebellum  Right carotid stenosis  Presented on 1/14/2021 with speech/language impairment and gait disturbance.  On review of chart patient was then offered TPA, he had declined.   A1c 5.6%, LDL 93. TTE - EF normal, normal sized atria  -Restarted PTA aspirin 325 mg oral daily  -outpatient f/u with neurology for carotid stenosis  - Continue statin      THADDEUS, pre-renal, resolved  During admission last month noted to have a creatinine of 1.41 that improved to 1.01.  Have again presents with creatinine of 1.22.  Avoid nephrotoxic medications.   -Last CR WNL.  Eating, taking in fluids.      Chronic obstructive pulmonary disease history  He smoked for over 40 years, a pack a day, but quit a few years ago.  Not requiring any inhalers.  Monitor        Diet: Combination Diet Low Saturated Fat Na <2400mg Diet    DVT Prophylaxis: Ambulate every shift  Shen Catheter: not present  Code Status: Full Code           Disposition Plan   Expected discharge: patient is medically stable, but awaiting safe disposition at this time   Entered: Nito Nice MD 03/03/2021, 12:28 PM       The patient's care was discussed with the Bedside Nurse, Care Coordinator/ and Patient.    Nito Nice MD  Hospitalist Service  [facility]  Contact information available via [facility] Paging/Directory    ______________________________________________________________________    Interval History   Patient offer no complaints this morning.     Data reviewed today: I reviewed all medications, new labs and imaging results over the last 24 hours. I personally reviewed no images or EKG's today.    Physical Exam   Vital Signs: Temp: 97.6  F (36.4  C) Temp  src: Oral BP: 128/85 Pulse: 80   Resp: 18 SpO2: 95 % O2 Device: None (Room air)    Weight: 163 lbs 11.2 oz  GEN:  Alert, oriented , appears comfortable, no distress   HEENT:  Normocephalic/atraumatic, no scleral icterus, no nasal discharge, mouth moist.  EXT:  No edema.  No cyanosis.  No acute joint synovitis noted.  SKIN:  Dry to touch, no exanthems noted in the visualized areas.    Data   No lab results found in last 7 days.    No results found for this or any previous visit (from the past 24 hour(s)).  Medications       aspirin  325 mg Oral or NG Tube Daily     atorvastatin  40 mg Oral QPM

## 2021-03-04 ENCOUNTER — APPOINTMENT (OUTPATIENT)
Dept: PHYSICAL THERAPY | Facility: CLINIC | Age: 76
DRG: 070 | End: 2021-03-04
Payer: COMMERCIAL

## 2021-03-04 PROCEDURE — 250N000013 HC RX MED GY IP 250 OP 250 PS 637: Performed by: HOSPITALIST

## 2021-03-04 PROCEDURE — 97116 GAIT TRAINING THERAPY: CPT | Mod: GP

## 2021-03-04 PROCEDURE — 250N000013 HC RX MED GY IP 250 OP 250 PS 637: Performed by: STUDENT IN AN ORGANIZED HEALTH CARE EDUCATION/TRAINING PROGRAM

## 2021-03-04 PROCEDURE — 97750 PHYSICAL PERFORMANCE TEST: CPT | Mod: GP

## 2021-03-04 PROCEDURE — 99231 SBSQ HOSP IP/OBS SF/LOW 25: CPT | Performed by: INTERNAL MEDICINE

## 2021-03-04 PROCEDURE — 120N000001 HC R&B MED SURG/OB

## 2021-03-04 RX ADMIN — ASPIRIN 325 MG ORAL TABLET 325 MG: 325 PILL ORAL at 07:56

## 2021-03-04 RX ADMIN — ATORVASTATIN CALCIUM 40 MG: 40 TABLET, FILM COATED ORAL at 20:03

## 2021-03-04 ASSESSMENT — ACTIVITIES OF DAILY LIVING (ADL)
ADLS_ACUITY_SCORE: 13
ADLS_ACUITY_SCORE: 15
ADLS_ACUITY_SCORE: 15
ADLS_ACUITY_SCORE: 13
ADLS_ACUITY_SCORE: 15
ADLS_ACUITY_SCORE: 13

## 2021-03-04 NOTE — PROGRESS NOTES
[facility]    Medicine Progress Note - Hospitalist Service       Date of Admission:  2/17/2021    Assessment & Plan       Jose Vazquez is a 75-year-old male with a medical history of recent stroke who presented to the emergency department on 2/17/2021 after he was found in the community down after a fall.  Further evaluation has suggested some impairment and a notable SLUMS core of 6/30.  Psych eval during this stay felt he is not decisional.   Patient is currently awaiting disposition.         Assessment & Plan      Fall, likely in the setting of physical deconditioning:  Patient not able to give any significant collateral history.  Based on his weakness and PT eval's, likely this was due to physical deconditioning. Overall improve at this time, continue PT and OT.   -TTE without any valvular abnormalities, telemetry NSR, laboratory work-up negative including TSH, CK, LFTs, troponin.  - tele NSR, discontinued earlier in stay.  - working with therapy     Concern for cognitive impairment vs Acute encephalopathy (post stroke vs TBI vs other)  Concern for vulnerable adult:  Patient previously living independently prior to his recent stroke.  After that he was discharged to a TCU which he left AMA from.  He does not recall the events leading to his AMA discharge.  Although he is oriented at times, significant concerns related to his decision-making ability. Unclear cause for this, likely progressive dementia with recent acute stroke worsening the situation.   This is a difficult situation with patient deemed not decisional on intial evaluation by psych and no next of kin or other surrogate decision makers. Patient states he has a neighbor who helps him, but is clear he has never had a medical power of .  He had wanted his neighbor/neighbors son to possibly be power of  but they ultimately did not feel comfortable with requirements.     Dr Henderson Spoke with neighbor Trent Escobar  at [phone] on 2/22/21 . He is the son of neighbor Barron. Jose usually goes over to Barron's house daily to chat and will often spend most of the day there. Trent visits his dad every day and is primary caretaker for him. Trent wonders if Jose could ever come to live with his dad in the future, but would like him to complete a stay at TCU first. Trent did not fully commit to this plan, but seemed open to it. Trent will call Jose on 2/23 to encourage him to work with therapies and accept TCU placement. Discussed with care coordinator. If psych believes him decisional, may be able to appoint Trent as decision maker for major decisions and then arrange discharge to TCU.       Most recent Psych evaluation on 3/1, as per assessment patient is not decisional, there may be some improvement in his cognition but he has limited capacity to appreciate his current circumstances. Can initiate hold if trying to leave.        -See social work note on 2/26/2021, unfortunately Trent Escobar(neighbor) who previously had identified would be willing to take on the role of healthcare appointee to assist in healthcare decisions has decided to revoke that responsibility.  Therefore without next of kin and no other healthcare appointee to assist with medical decisions may need to proceed with guardianship.  Current healthcare directive for decision making recently filled out revoked as appointee has now declined.         -OT for cognitive eval (SLUMS 6/30 on Feb 19 and 10/30 on March 1)  -Social work consult, they will file vulnerable adult  -Psych consult on 3/1 opted to hold patient and pursue commitment and subsequent guardianship    Hematuria:  Noted on urinalysis for many years  -Follow-up with outpatient urology discharge     Concern for UTI earlier in stay:  -Patient denies urinary symptoms  -Urine culture with no organisms  -Discontinued ceftriaxone earlier in stay.       HTN:  -Holding PTA amlodipine as BP controlled without  it currently.  Likely discharge off it unless changes before that point.     Subacute left basal ganglia stroke with possible petechial hemorrhage within the above infarct  Chronic-appearing infarct in the inferior medial right cerebellum  Multiple probable chronic microhemorrhages within both cerebral hemispheres and cerebellum  Right carotid stenosis  Presented on 1/14/2021 with speech/language impairment and gait disturbance.  On review of chart patient was then offered TPA, he had declined.   A1c 5.6%, LDL 93. TTE - EF normal, normal sized atria  -Restarted PTA aspirin 325 mg oral daily  -outpatient f/u with neurology for carotid stenosis  - Continue statin      THADDEUS, pre-renal, resolved  During admission last month noted to have a creatinine of 1.41 that improved to 1.01.  Have again presents with creatinine of 1.22.  Avoid nephrotoxic medications.   -Last CR WNL.  Eating, taking in fluids.      Chronic obstructive pulmonary disease history  He smoked for over 40 years, a pack a day, but quit a few years ago.  Not requiring any inhalers.  Monitor        Diet: Combination Diet Low Saturated Fat Na <2400mg Diet    DVT Prophylaxis: Ambulate every shift  Shen Catheter: not present  Code Status: Full Code           Disposition Plan   Expected discharge: patient is medically stable, but awaiting safe disposition at this time   Entered: Nito Nice MD 03/04/2021, 12:46 PM       The patient's care was discussed with the Bedside Nurse, Care Coordinator/ and Patient.    Nito Nice MD  Hospitalist Service  [facility]  Contact information available via [facility] Paging/Directory    ______________________________________________________________________    Interval History   Patient offer no complaints this morning.     Data reviewed today: I reviewed all medications, new labs and imaging results over the last 24 hours. I personally reviewed no images or EKG's today.    Physical Exam    Vital Signs: Temp: 97.5  F (36.4  C) Temp src: Oral BP: 126/89 Pulse: 81   Resp: 16 SpO2: 95 % O2 Device: None (Room air)    Weight: 163 lbs 11.2 oz  GEN:  Alert, oriented , appears comfortable, no distress   HEENT:  Normocephalic/atraumatic, no scleral icterus, no nasal discharge, mouth moist.  EXT:  No edema.  No cyanosis.  No acute joint synovitis noted.  SKIN:  Dry to touch, no exanthems noted in the visualized areas.    Data   No lab results found in last 7 days.    No results found for this or any previous visit (from the past 24 hour(s)).  Medications       aspirin  325 mg Oral or NG Tube Daily     atorvastatin  40 mg Oral QPM

## 2021-03-04 NOTE — PLAN OF CARE
DATE & TIME: 03/3 7179-5338  Cognitive Concerns/ Orientation: Disoriented to time and situation.  Pleasant, cooperative.  ABNL VS/O2: VSS on RA  MOBILITY: SBA, refuses GB and walker  PAIN MANAGMENT: Denies pain  DIET: Low saturated fat-good appetite  BOWEL/BLADDER: Continent. Up to bathroom.   ABNL LAB/BG: N/A  DRAIN/DEVICES: No IV access.  TELEMETRY RHYTHM: N/A  SKIN: Scattered bruising and scabs.   TESTS/PROCEDURES: N/A   D/C DAY/GOALS/PLACE: Pending discharge.  Commitment and eventual guardianship per psych note.   OTHER IMPORTANT INFO: 72 hour hold placed 3/1/21, sitter at bedside. Ambulation encouraged in the halls several times. Declined a shower    [facility] called and stated court hold effective as of 3/3/2021 @ 1044.  Paperwork/documentations will be sent tomorrow per representative.     Cesar Eisenberg RN

## 2021-03-04 NOTE — PLAN OF CARE
Cognitive Concerns/ Orientation: Disoriented to situation.  Calm. Flat affect  ABNL VS/O2: VSS on room air  MOBILITY: SBA with walker and gait belt in the hallway  several times and to the bathroom. Slight right foot drop  PAIN MANAGMENT: Denies pain  DIET: Low saturated fat-good appetite  BOWEL/BLADDER: Continent. Up to bathroom.   ABNL LAB/BG: N/A  DRAIN/DEVICES: No IV access.  SKIN: Scattered bruising  TESTS/PROCEDURES: N/A   D/C DAY/GOALS/PLACE: Pending discharge.  Commitment and eventual guardianship per psych note.   OTHER IMPORTANT INFO: Sitter discontinued.  Declined a shower    [facility] called and stated court hold effective as of 3/3/2021 1618.  Paperwork/documentations on the chart

## 2021-03-04 NOTE — PLAN OF CARE
DATE & TIME: 3/4/21  Cognitive Concerns/ Orientation: Disoriented to situation.  Calm, pleasant, cooperative.  ABNL VS/O2: VSS on RA  MOBILITY: SBA, refuses GB and walker  PAIN MANAGMENT: Denies pain  DIET: Low saturated fat-good appetite  BOWEL/BLADDER: Continent. Up to bathroom.   ABNL LAB/BG: N/A  DRAIN/DEVICES: No IV access.  TELEMETRY RHYTHM: N/A  SKIN: Scattered bruising and scabs.   TESTS/PROCEDURES: N/A   D/C DAY/GOALS/PLACE: Pending discharge.  Commitment and eventual guardianship per psych note.   OTHER IMPORTANT INFO: Sitter at bedside. Ambulation encouraged in the halls several times. Declined a shower    [facility] called and stated court hold effective as of 3/3/2021 1618.  Paperwork/documentations will be sent tomorrow per representative.

## 2021-03-04 NOTE — PROGRESS NOTES
03/04/21 0800   Signing Clinician's Name / Credentials   Signing clinician's name / credentials Janna Barber, PT, DPT   Lomas Balance Scale (LOMAS JASSON, MINESH S, MARC CHING, BECKY RESTREPO: MEASURING BALANCE IN THE ELDERLY: VALIDATION OF AN INSTRUMENT. CAN. J. PUB. HEALTH, JULY/AUGUST SUPPLEMENT 2:S7-11, 1992.)   Sit To Stand 3   Standing Unsupported 4   Sitting Unsupported 4   Stand to Sit 4   Transfers 3   Standing with Eyes Closed 3   Standing Unsupported, Feet Together 2   Reach Forward With Outstretched Arm 3   Retrieve Object From Floor 3   Turning to Look Behind 2   Turn 360 Degrees 2   Placing Alternate Foot on Stool (4-6 inches) 3   Unsupported Tandem Stand (Demonstrate to Subject) 3   One Leg Stand 1   Total Score (A score of 45 or less has been correlated with an increased risk of falls)   Total Score (out of 56) 40   Lomas Balance Scale (BBS) Cutoff Scores: A score of < 45/56 indicates an increased risk for falls.   Patient Score: 40/56    The BBS is a measure of static and dynamic standing balance that has been validated in community dwelling elderly individuals and individuals who have Parkinson's Disease, MS, and those who are s/p CVA and TBI. The test is administered without an assistive device. Scores from the Lomas are used to determine the probability of falling based on the patient's previous history of falls and their test performance.     Minimal Detectable Change = 6.5   & Minimal Detectable Change (Parkinson's Disease) = 5 according to Yuri & Louisey 2008    Assessment (rationale for performing, application to patient s function & care plan): Patient demos some unsteadiness with dynamic tasks. Pt scored 40/56 on LOMAS indicating risk of falling. Pt will benefit from using AD; pt does currently verbalize agreement to using walker going forward  Minutes billed as physical performance test: 11

## 2021-03-05 ENCOUNTER — APPOINTMENT (OUTPATIENT)
Dept: PHYSICAL THERAPY | Facility: CLINIC | Age: 76
DRG: 070 | End: 2021-03-05
Payer: COMMERCIAL

## 2021-03-05 ENCOUNTER — APPOINTMENT (OUTPATIENT)
Dept: OCCUPATIONAL THERAPY | Facility: CLINIC | Age: 76
DRG: 070 | End: 2021-03-05
Payer: COMMERCIAL

## 2021-03-05 PROCEDURE — 250N000013 HC RX MED GY IP 250 OP 250 PS 637: Performed by: STUDENT IN AN ORGANIZED HEALTH CARE EDUCATION/TRAINING PROGRAM

## 2021-03-05 PROCEDURE — 250N000013 HC RX MED GY IP 250 OP 250 PS 637: Performed by: HOSPITALIST

## 2021-03-05 PROCEDURE — 97116 GAIT TRAINING THERAPY: CPT | Mod: GP | Performed by: PHYSICAL THERAPY ASSISTANT

## 2021-03-05 PROCEDURE — 120N000001 HC R&B MED SURG/OB

## 2021-03-05 PROCEDURE — 99231 SBSQ HOSP IP/OBS SF/LOW 25: CPT | Performed by: INTERNAL MEDICINE

## 2021-03-05 PROCEDURE — 97535 SELF CARE MNGMENT TRAINING: CPT | Mod: GO

## 2021-03-05 RX ADMIN — ATORVASTATIN CALCIUM 40 MG: 40 TABLET, FILM COATED ORAL at 20:06

## 2021-03-05 RX ADMIN — ASPIRIN 325 MG ORAL TABLET 325 MG: 325 PILL ORAL at 07:47

## 2021-03-05 ASSESSMENT — ACTIVITIES OF DAILY LIVING (ADL)
ADLS_ACUITY_SCORE: 14
ADLS_ACUITY_SCORE: 15
ADLS_ACUITY_SCORE: 15

## 2021-03-05 NOTE — PLAN OF CARE
DATE & TIME: 3/4/21 [id number]  Cognitive Concerns/ Orientation: Disoriented to situation.  Calm. Flat affect  ABNL VS/O2: VSS on room air  MOBILITY: SBA with walker and gait belt in the hallway; patient was showered today. Slight right foot drop.  PAIN MANAGMENT: Denies pain  DIET: Low saturated fat-good appetite  BOWEL/BLADDER: Continent. Up to bathroom.   ABNL LAB/BG: N/A  DRAIN/DEVICES: No IV access.  SKIN: Scattered bruising  TESTS/PROCEDURES: N/A   D/C DAY/GOALS/PLACE: Pending discharge.  Commitment and eventual guardianship per psych note.   OTHER IMPORTANT INFO: Sitter discontinued; patient turns off bed alarm and gets out of bed.    [facility] called and stated court hold effective as of 3/3/2021 1618.  Paperwork/documentations on the chart

## 2021-03-05 NOTE — PROGRESS NOTES
[facility]    Medicine Progress Note - Hospitalist Service       Date of Admission:  2/17/2021    Assessment & Plan       Jose Vazquez is a 75-year-old male with a medical history of recent stroke who presented to the emergency department on 2/17/2021 after he was found in the community down after a fall.  Further evaluation has suggested some impairment and a notable SLUMS core of 6/30.  Psych eval during this stay felt he is not decisional.   Patient is currently awaiting disposition.         Assessment & Plan      Fall, likely in the setting of physical deconditioning:  Patient not able to give any significant collateral history.  Based on his weakness and PT eval's, likely this was due to physical deconditioning. Overall improve at this time, continue PT and OT.   -TTE without any valvular abnormalities, telemetry NSR, laboratory work-up negative including TSH, CK, LFTs, troponin.  - tele NSR, discontinued earlier in stay.  - working with therapy  Overall remain stable at this time.      Concern for cognitive impairment vs Acute encephalopathy (post stroke vs TBI vs other)  Concern for vulnerable adult:  Patient previously living independently prior to his recent stroke.  After that he was discharged to a TCU which he left AMA from.  He does not recall the events leading to his AMA discharge.  Although he is oriented at times, significant concerns related to his decision-making ability. Unclear cause for this, likely progressive dementia with recent acute stroke worsening the situation.   This is a difficult situation with patient deemed not decisional on intial evaluation by psych and no next of kin or other surrogate decision makers. Patient states he has a neighbor who helps him, but is clear he has never had a medical power of .  He had wanted his neighbor/neighbors son to possibly be power of  but they ultimately did not feel comfortable with requirements.       Santiago Spoke with neighbor Trent Escobar at [phone] on 2/22/21 . He is the son of neighbor Barron. Jose usually goes over to Barron's house daily to chat and will often spend most of the day there. Trent visits his dad every day and is primary caretaker for him. Trent wonders if Jose could ever come to live with his dad in the future, but would like him to complete a stay at TCU first. Trent did not fully commit to this plan, but seemed open to it. Trent will call Jose on 2/23 to encourage him to work with therapies and accept TCU placement. Discussed with care coordinator. If psych believes him decisional, may be able to appoint Trent as decision maker for major decisions and then arrange discharge to TCU.       Most recent Psych evaluation on 3/1, as per assessment patient is not decisional, there may be some improvement in his cognition but he has limited capacity to appreciate his current circumstances. Can initiate hold if trying to leave.        -See social work note on 2/26/2021, unfortunately Trent Escobar(neighbor) who previously had identified would be willing to take on the role of healthcare appointee to assist in healthcare decisions has decided to revoke that responsibility.  Therefore without next of kin and no other healthcare appointee to assist with medical decisions may need to proceed with guardianship.  Current healthcare directive for decision making recently filled out revoked as appointee has now declined.         -OT for cognitive eval (SLUMS 6/30 on Feb 19 and 10/30 on March 1)  -Social work consult, they will file vulnerable adult  -Psych consult on 3/1 opted to hold patient and pursue commitment and subsequent guardianship  Patient is stable at this time.     Hematuria:  Noted on urinalysis for many years  -Follow-up with outpatient urology discharge     Concern for UTI earlier in stay:  -Patient denies urinary symptoms  -Urine culture with no organisms  -Discontinued ceftriaxone  earlier in stay.       HTN:  -Holding PTA amlodipine as BP controlled without it currently.  Likely discharge off it unless changes before that point.     Subacute left basal ganglia stroke with possible petechial hemorrhage within the above infarct  Chronic-appearing infarct in the inferior medial right cerebellum  Multiple probable chronic microhemorrhages within both cerebral hemispheres and cerebellum  Right carotid stenosis  Presented on 1/14/2021 with speech/language impairment and gait disturbance.  On review of chart patient was then offered TPA, he had declined.   A1c 5.6%, LDL 93. TTE - EF normal, normal sized atria  -Restarted PTA aspirin 325 mg oral daily  -outpatient f/u with neurology for carotid stenosis  - Continue statin      THADDEUS, pre-renal, resolved  During admission last month noted to have a creatinine of 1.41 that improved to 1.01.  Have again presents with creatinine of 1.22.  Avoid nephrotoxic medications.   -Last CR WNL.  Eating, taking in fluids.      Chronic obstructive pulmonary disease history  He smoked for over 40 years, a pack a day, but quit a few years ago.  Not requiring any inhalers.  Monitor        Diet: Combination Diet Low Saturated Fat Na <2400mg Diet    DVT Prophylaxis: Ambulate every shift  Shen Catheter: not present  Code Status: Full Code           Disposition Plan   Expected discharge: patient is medically stable, but awaiting safe disposition at this time   Entered: Nito Nice MD 03/05/2021, 12:48 PM       The patient's care was discussed with the Bedside Nurse, Care Coordinator/ and Patient.    Nito Nice MD  Hospitalist Service  [facility]  Contact information available via [facility] Paging/Directory    ______________________________________________________________________    Interval History   Patient sitting in his bed, denies any chest pain, SOB, fever, chills, nausea, vomiting, headache or dizziness at this time,    Data  reviewed today: I reviewed all medications, new labs and imaging results over the last 24 hours. I personally reviewed no images or EKG's today.    Physical Exam   Vital Signs: Temp: 97  F (36.1  C) Temp src: Oral BP: (!) 139/103 Pulse: 96   Resp: 16 SpO2: 93 % O2 Device: None (Room air)    Weight: 163 lbs 11.2 oz  GEN:  Alert, oriented , appears comfortable, no distress   HEENT:  Normocephalic/atraumatic, no scleral icterus, no nasal discharge, mouth moist.  EXT:  No edema.  No cyanosis.  No acute joint synovitis noted.  SKIN:  Dry to touch, no exanthems noted in the visualized areas.    Data   No lab results found in last 7 days.    No results found for this or any previous visit (from the past 24 hour(s)).  Medications       aspirin  325 mg Oral or NG Tube Daily     atorvastatin  40 mg Oral QPM

## 2021-03-05 NOTE — PLAN OF CARE
Cognitive Concerns/ Orientation: A&O x3, disoriented to situation.  Calm and cooperative. Flat affect  ABNL VS/O2: VSS on room air  MOBILITY: SBA with walker and GB  PAIN MANAGMENT: Denies pain  DIET: 2g Na/low sat fat, tolerating.   BOWEL/BLADDER: Continent. Up to bathroom.   ABNL LAB/BG: N/A  DRAIN/DEVICES: No IV access.  SKIN: Scattered bruising  TESTS/PROCEDURES: N/A   D/C DAY/GOALS/PLACE: Pending discharge. Commitment and eventual guardianship per psych note.   OTHER IMPORTANT INFO: On court hold. SW following for disposition. PT/OT.

## 2021-03-06 PROCEDURE — 250N000013 HC RX MED GY IP 250 OP 250 PS 637: Performed by: INTERNAL MEDICINE

## 2021-03-06 PROCEDURE — 120N000001 HC R&B MED SURG/OB

## 2021-03-06 PROCEDURE — 250N000013 HC RX MED GY IP 250 OP 250 PS 637: Performed by: HOSPITALIST

## 2021-03-06 PROCEDURE — 250N000013 HC RX MED GY IP 250 OP 250 PS 637: Performed by: STUDENT IN AN ORGANIZED HEALTH CARE EDUCATION/TRAINING PROGRAM

## 2021-03-06 PROCEDURE — 99232 SBSQ HOSP IP/OBS MODERATE 35: CPT | Performed by: INTERNAL MEDICINE

## 2021-03-06 RX ORDER — AMLODIPINE BESYLATE 5 MG/1
5 TABLET ORAL DAILY
Status: DISCONTINUED | OUTPATIENT
Start: 2021-03-06 | End: 2021-03-17 | Stop reason: HOSPADM

## 2021-03-06 RX ADMIN — POLYETHYLENE GLYCOL 3350 17 G: 17 POWDER, FOR SOLUTION ORAL at 13:34

## 2021-03-06 RX ADMIN — AMLODIPINE BESYLATE 5 MG: 5 TABLET ORAL at 13:34

## 2021-03-06 RX ADMIN — ATORVASTATIN CALCIUM 40 MG: 40 TABLET, FILM COATED ORAL at 20:39

## 2021-03-06 RX ADMIN — ASPIRIN 325 MG ORAL TABLET 325 MG: 325 PILL ORAL at 09:04

## 2021-03-06 ASSESSMENT — ACTIVITIES OF DAILY LIVING (ADL)
ADLS_ACUITY_SCORE: 15
ADLS_ACUITY_SCORE: 16
ADLS_ACUITY_SCORE: 15

## 2021-03-06 NOTE — PROGRESS NOTES
[facility]    Medicine Progress Note - Hospitalist Service       Date of Admission:  2/17/2021    Assessment & Plan       Jose Vazquez is a 75-year-old male with a medical history of recent stroke who presented to the emergency department on 2/17/2021 after he was found in the community down after a fall.  Further evaluation has suggested some impairment and a notable SLUMS core of 6/30.  Psych eval during this stay felt he is not decisional.   Patient is currently awaiting disposition.         Assessment & Plan    Patient remain hemodynamically stable and in his usual state, awaiting safe disposition at this time.       Fall, likely in the setting of physical deconditioning:  Patient not able to give any significant collateral history.  Based on his weakness and PT eval's, likely this was due to physical deconditioning. Overall improve at this time, continue PT and OT.   -TTE without any valvular abnormalities, telemetry NSR, laboratory work-up negative including TSH, CK, LFTs, troponin.  - tele NSR, discontinued earlier in stay.  - working with therapy  Overall remain stable at this time.      Concern for cognitive impairment vs Acute encephalopathy (post stroke vs TBI vs other)  Concern for vulnerable adult:  Patient previously living independently prior to his recent stroke.  After that he was discharged to a TCU which he left AMA from.  He does not recall the events leading to his AMA discharge.  Although he is oriented at times, significant concerns related to his decision-making ability. Unclear cause for this, likely progressive dementia with recent acute stroke worsening the situation.   This is a difficult situation with patient deemed not decisional on intial evaluation by psych and no next of kin or other surrogate decision makers. Patient states he has a neighbor who helps him, but is clear he has never had a medical power of .  He had wanted his neighbor/neighbors son to  possibly be power of  but they ultimately did not feel comfortable with requirements.     Dr Henderson Spoke with neighbor Trent Escobar at [phone] on 2/22/21 . He is the son of neighbor Barron. Jose usually goes over to Barron's house daily to chat and will often spend most of the day there. Trent visits his dad every day and is primary caretaker for him. Trent wonders if Jose could ever come to live with his dad in the future, but would like him to complete a stay at TCU first. Trent did not fully commit to this plan, but seemed open to it. Trent will call Jose on 2/23 to encourage him to work with therapies and accept TCU placement. Discussed with care coordinator. If psych believes him decisional, may be able to appoint Trent as decision maker for major decisions and then arrange discharge to TCU.       Most recent Psych evaluation on 3/1, as per assessment patient is not decisional, there may be some improvement in his cognition but he has limited capacity to appreciate his current circumstances. Can initiate hold if trying to leave.        -See social work note on 2/26/2021, unfortunately Trent Escobar(neighbor) who previously had identified would be willing to take on the role of healthcare appointee to assist in healthcare decisions has decided to revoke that responsibility.  Therefore without next of kin and no other healthcare appointee to assist with medical decisions may need to proceed with guardianship.  Current healthcare directive for decision making recently filled out revoked as appointee has now declined.         -OT for cognitive eval (SLUMS 6/30 on Feb 19 and 10/30 on March 1)  -Social work consult, they will file vulnerable adult  -Psych consult on 3/1 opted to hold patient and pursue commitment and subsequent guardianship  Patient is stable at this time.     Hematuria: resolved.   Noted on urinalysis for many years  -Follow-up with outpatient urology discharge     Concern for UTI  earlier in stay:  -Patient denies urinary symptoms  -Urine culture with no organisms  -Discontinued ceftriaxone earlier in stay.       HTN:  -Holding PTA amlodipine as BP controlled without it currently.    Now blood pressure running slightly on higher side, 139-144 systolic and  diastolic.  He was 10 mg of amlodipine at home, will restart at a lower dose of 5 mg now.   .     Subacute left basal ganglia stroke with possible petechial hemorrhage within the above infarct  Chronic-appearing infarct in the inferior medial right cerebellum  Multiple probable chronic microhemorrhages within both cerebral hemispheres and cerebellum  Right carotid stenosis  Presented on 1/14/2021 with speech/language impairment and gait disturbance.  On review of chart patient was then offered TPA, he had declined.   A1c 5.6%, LDL 93. TTE - EF normal, normal sized atria  -Restarted PTA aspirin 325 mg oral daily  -outpatient f/u with neurology for carotid stenosis  - Continue statin      THADDEUS, pre-renal, resolved  During admission last month noted to have a creatinine of 1.41 that improved to 1.01.  Have again presents with creatinine of 1.22.  Avoid nephrotoxic medications.   -Last CR WNL.  Eating, taking in fluids.      Chronic obstructive pulmonary disease history  He smoked for over 40 years, a pack a day, but quit a few years ago.  Not requiring any inhalers.  Monitor        Diet: Combination Diet Low Saturated Fat Na <2400mg Diet    DVT Prophylaxis: Ambulate every shift  Shen Catheter: not present  Code Status: Full Code           Disposition Plan   Expected discharge: patient is medically stable, but awaiting safe disposition at this time   Entered: Nito Nice MD 03/06/2021, 12:12 PM       The patient's care was discussed with the Bedside Nurse, Care Coordinator/ and Patient.    Nito Nice MD  Hospitalist Service  [facility]  Contact information available via [facility]  Paging/Directory    ______________________________________________________________________    Interval History   Offer no complaints today.     No other significant event overnight.     Data reviewed today: I reviewed all medications, new labs and imaging results over the last 24 hours. I personally reviewed no images or EKG's today.    Physical Exam   Vital Signs: Temp: 97.6  F (36.4  C) Temp src: Oral BP: (!) 144/104 Pulse: 81   Resp: 20 SpO2: 96 % O2 Device: None (Room air)    Weight: 163 lbs 11.2 oz  GEN:  Alert, oriented , appears comfortable, no distress   HEENT:  Normocephalic/atraumatic, no scleral icterus, no nasal discharge, mouth moist.  EXT:  No edema.  No cyanosis.  No acute joint synovitis noted.  SKIN:  Dry to touch, no exanthems noted in the visualized areas.    Data   No lab results found in last 7 days.    No results found for this or any previous visit (from the past 24 hour(s)).  Medications       aspirin  325 mg Oral or NG Tube Daily     atorvastatin  40 mg Oral QPM

## 2021-03-06 NOTE — PLAN OF CARE
Cognitive Concerns/ Orientation: A&Ox3, disoriented to situation.  Calm and cooperative. Flat affect  ABNL VS/O2: VSS on room air  MOBILITY: SBA with walker and GB, ambulated in the halls frequently.  PAIN MANAGMENT: Denies pain  DIET: 2g Na/low sat fat, good appetite  BOWEL/BLADDER: Continent. Up to bathroom.   ABNL LAB/BG: N/A  DRAIN/DEVICES: No IV access.  SKIN: Scattered bruising  TESTS/PROCEDURES: N/A   D/C DAY/GOALS/PLACE: Pending discharge. Commitment and eventual guardianship per psych note.   OTHER IMPORTANT INFO: On court hold. SW following for disposition. PT/OT. A calendar was given to patient to assist in keeping his oriented to the month, day of week, date and year.    [facility] called and stated court hold effective as of 3/3/2021 1618.  Paperwork/documentations on the chart

## 2021-03-06 NOTE — PLAN OF CARE
DATE & TIME: 3/6/21 8862-2180  Cognitive Concerns/ Orientation: A&O x2, disoriented to situation and time. Calm and cooperative. Flat affect, withdrawn.   ABNL VS/O2: VSS on room air  MOBILITY: SBA with walker and GB  PAIN MANAGMENT: Denies pain  DIET: 2g Na/low sat fat, good appetite  BOWEL/BLADDER: Continent. Up to bathroom.   ABNL LAB/BG: N/A  DRAIN/DEVICES: No IV access.  SKIN: Scattered bruising  TESTS/PROCEDURES: N/A   D/C DAY/GOALS/PLACE: Pending discharge. Commitment and eventual guardianship per psych note.   OTHER IMPORTANT INFO: Door alarm in place, on court hold, effective as of 3/3/2021 1618. Paperwork/documentations on the chart. SW following for disposition. PT/OT. A calendar was given to patient to assist in keeping him oriented to the month, day/date and year.

## 2021-03-06 NOTE — PLAN OF CARE
DATE & TIME: 3/5/21 6961-8277  Cognitive Concerns/ Orientation: A&O x 3, disoriented to situation .  Calm and cooperative. Flat affect, withdrawn.   ABNL VS/O2: VSS on room air, remains afebrile.   MOBILITY: SBA with walker and GB  PAIN MANAGMENT: Denies pain  DIET: 2g Na/low sat fat, good appetite  BOWEL/BLADDER: Continent. Up to bathroom.   ABNL LAB/BG: N/A  DRAIN/DEVICES: No IV access.  SKIN: Scattered bruising  TESTS/PROCEDURES: N/A   D/C DAY/GOALS/PLACE: Pending discharge. Commitment and eventual guardianship per psych note.   OTHER IMPORTANT INFO: On court hold. SW following for disposition. PT/OT. A calendar was given to patient to assist in keeping his oriented to the month, day/date and year. [facility] called yesterday and stated court hold effective as of 3/3/2021 1618. Paperwork/documentations on the chart.

## 2021-03-06 NOTE — PLAN OF CARE
DATE & TIME: 3/6/21 6691-8146  Cognitive Concerns/ Orientation: A&O x3, disoriented to situation, forgetful. Calm and cooperative. Flat affect  ABNL VS/O2: VSS on RA  MOBILITY: SBA with walker/GB, patient does turn of bed alarm on his own at times, educated about falls risk policy.  PAIN MANAGMENT: Denies  DIET: 2g Na/low sat fat, good appetite  BOWEL/BLADDER: Continent. BM x1 per pt report  ABNL LAB/BG: N/A  DRAIN/DEVICES: No IV access.  SKIN: Scattered bruising  TESTS/PROCEDURES: N/A   D/C DAY/GOALS/PLACE: pending commitment and eventual guardianship per psych note. On court hold, door alarm in place.   OTHER IMPORTANT INFO: SW following for disposition.

## 2021-03-07 ENCOUNTER — APPOINTMENT (OUTPATIENT)
Dept: OCCUPATIONAL THERAPY | Facility: CLINIC | Age: 76
DRG: 070 | End: 2021-03-07
Payer: COMMERCIAL

## 2021-03-07 PROCEDURE — 250N000013 HC RX MED GY IP 250 OP 250 PS 637: Performed by: INTERNAL MEDICINE

## 2021-03-07 PROCEDURE — 99232 SBSQ HOSP IP/OBS MODERATE 35: CPT | Performed by: INTERNAL MEDICINE

## 2021-03-07 PROCEDURE — 120N000001 HC R&B MED SURG/OB

## 2021-03-07 PROCEDURE — 87635 SARS-COV-2 COVID-19 AMP PRB: CPT | Performed by: INTERNAL MEDICINE

## 2021-03-07 PROCEDURE — 250N000013 HC RX MED GY IP 250 OP 250 PS 637: Performed by: HOSPITALIST

## 2021-03-07 PROCEDURE — 250N000013 HC RX MED GY IP 250 OP 250 PS 637: Performed by: STUDENT IN AN ORGANIZED HEALTH CARE EDUCATION/TRAINING PROGRAM

## 2021-03-07 PROCEDURE — 97535 SELF CARE MNGMENT TRAINING: CPT | Mod: GO

## 2021-03-07 RX ADMIN — ASPIRIN 325 MG ORAL TABLET 325 MG: 325 PILL ORAL at 08:03

## 2021-03-07 RX ADMIN — AMLODIPINE BESYLATE 5 MG: 5 TABLET ORAL at 08:03

## 2021-03-07 RX ADMIN — ATORVASTATIN CALCIUM 40 MG: 40 TABLET, FILM COATED ORAL at 20:07

## 2021-03-07 RX ADMIN — POLYETHYLENE GLYCOL 3350 17 G: 17 POWDER, FOR SOLUTION ORAL at 14:03

## 2021-03-07 ASSESSMENT — ACTIVITIES OF DAILY LIVING (ADL)
ADLS_ACUITY_SCORE: 15
ADLS_ACUITY_SCORE: 15
ADLS_ACUITY_SCORE: 16
ADLS_ACUITY_SCORE: 15
ADLS_ACUITY_SCORE: 13
ADLS_ACUITY_SCORE: 15

## 2021-03-07 NOTE — PROGRESS NOTES
[facility]    Medicine Progress Note - Hospitalist Service       Date of Admission:  2/17/2021    Assessment & Plan       Jose Vazquez is a 75-year-old male with a medical history of recent stroke who presented to the emergency department on 2/17/2021 after he was found in the community down after a fall.  Further evaluation has suggested some impairment and a notable SLUMS core of 6/30.  Psych eval during this stay felt he is not decisional.   Patient is currently awaiting disposition.         Assessment & Plan    Patient remain hemodynamically stable and in his usual state,he is currently on a court Hold.       Fall, likely in the setting of physical deconditioning:  Patient not able to give any significant collateral history.  Based on his weakness and PT eval's, likely this was due to physical deconditioning. Overall improve at this time, continue PT and OT.   -TTE without any valvular abnormalities, telemetry NSR, laboratory work-up negative including TSH, CK, LFTs, troponin.  - tele NSR, discontinued earlier in stay.  - working with therapy  Overall remain stable at this time.      Concern for cognitive impairment vs Acute encephalopathy (post stroke vs TBI vs other)  Concern for vulnerable adult:  Patient previously living independently prior to his recent stroke.  After that he was discharged to a TCU which he left AMA from.  He does not recall the events leading to his AMA discharge.  Although he is oriented at times, significant concerns related to his decision-making ability. Unclear cause for this, likely progressive dementia with recent acute stroke worsening the situation.   This is a difficult situation with patient deemed not decisional on intial evaluation by psych and no next of kin or other surrogate decision makers. Patient states he has a neighbor who helps him, but is clear he has never had a medical power of .  He had wanted his neighbor/neighbors son to  possibly be power of  but they ultimately did not feel comfortable with requirements.     Dr Henderson Spoke with neighbor Trent Escobar at [phone] on 2/22/21 . He is the son of neighbor Barron. Jose usually goes over to Barron's house daily to chat and will often spend most of the day there. Trent visits his dad every day and is primary caretaker for him. Trent wonders if Jose could ever come to live with his dad in the future, but would like him to complete a stay at TCU first. Trent did not fully commit to this plan, but seemed open to it. Trent will call Jose on 2/23 to encourage him to work with therapies and accept TCU placement. Discussed with care coordinator. .       Most recent Psych evaluation on 3/1, as per assessment patient is not decisional, there may be some improvement in his cognition but he has limited capacity to appreciate his current circumstances. Can initiate hold if trying to leave. Now he is on court hold.        -See social work note on 2/26/2021, unfortunately Trent Escobar(neighbor) who previously had identified would be willing to take on the role of healthcare appointee to assist in healthcare decisions has decided to revoke that responsibility.  Therefore without next of kin and no other healthcare appointee to assist with medical decisions may need to proceed with guardianship.  Current healthcare directive for decision making recently filled out revoked as appointee has now declined.         -OT for cognitive eval (SLUMS 6/30 on Feb 19 and 10/30 on March 1)  -Social work consult, they will file vulnerable adult  -Psych consult on 3/1 opted to hold patient and pursue commitment and subsequent guardianship  Otherwise patient remain stable at this time.     Hematuria: resolved.   Noted on urinalysis for many years  -Follow-up with outpatient urology discharge     Concern for UTI earlier in stay:  -Patient denies urinary symptoms  -Urine culture with no  organisms  -Discontinued ceftriaxone earlier in stay.       HTN:  -was Holding PTA amlodipine as BP controlled without it currently.    Now blood pressure running slightly on higher side, 139-144 systolic and  diastolic.  He was 10 mg of amlodipine at home, restarted amlodipine at 5 mg daily on 3/6  Blood pressure better control now.   .     Subacute left basal ganglia stroke with possible petechial hemorrhage within the above infarct  Chronic-appearing infarct in the inferior medial right cerebellum  Multiple probable chronic microhemorrhages within both cerebral hemispheres and cerebellum  Right carotid stenosis  Presented on 1/14/2021 with speech/language impairment and gait disturbance.  On review of chart patient was then offered TPA, he had declined.   A1c 5.6%, LDL 93. TTE - EF normal, normal sized atria  -Restarted PTA aspirin 325 mg oral daily  -outpatient f/u with neurology for carotid stenosis  - Continue Lipitor 40 mg daily and amlodipine 5 mg daily at this time.       THADDEUS, pre-renal, resolved  During admission last month noted to have a creatinine of 1.41 that improved to 1.01.  Have again presents with creatinine of 1.22.  Avoid nephrotoxic medications.   -Last CR WNL.  Eating, taking in fluids.      Chronic obstructive pulmonary disease history  He smoked for over 40 years, a pack a day, but quit a few years ago.  Not requiring any inhalers.  Monitor        Diet: Combination Diet Low Saturated Fat Na <2400mg Diet    DVT Prophylaxis: Ambulate every shift  Shen Catheter: not present  Code Status: Full Code           Disposition Plan   Expected discharge: patient is medically stable, but awaiting safe disposition at this time   Entered: Nito Nice MD 03/07/2021, 11:34 AM       The patient's care was discussed with the Bedside Nurse, Care Coordinator/ and Patient.    Nito Nice MD  Hospitalist Service  [facility]  Contact information available via [facility]  Paging/Directory    ______________________________________________________________________    Interval History   Patient doing well this morning, denies any chest pain, SOB, nausea, vomiting, headache or dizziness.   Has no questions or concern this morning.     No other significant event overnight.     Data reviewed today: I reviewed all medications, new labs and imaging results over the last 24 hours. I personally reviewed no images or EKG's today.    Physical Exam   Vital Signs: Temp: 97.2  F (36.2  C) Temp src: Oral BP: (!) 138/94 Pulse: 78   Resp: 18 SpO2: 95 % O2 Device: None (Room air)    Weight: 163 lbs 11.2 oz  GEN:  Alert, oriented , appears comfortable, no distress   HEENT:  Normocephalic/atraumatic, no scleral icterus, no nasal discharge, mouth moist.  EXT:  No edema.  No cyanosis.  No acute joint synovitis noted.  SKIN:  Dry to touch, no exanthems noted in the visualized areas.  Chest: good air entry bilaterally, no wheezing or crackles.   CVS: S1 and S2 normal.   Abd: soft and non tender.     Data   No lab results found in last 7 days.    No results found for this or any previous visit (from the past 24 hour(s)).  Medications       amLODIPine  5 mg Oral Daily     aspirin  325 mg Oral or NG Tube Daily     atorvastatin  40 mg Oral QPM

## 2021-03-07 NOTE — PLAN OF CARE
DATE & TIME: 3/7/21 3542-7379  Cognitive Concerns/ Orientation: A&O x3, disoriented to situation, forgetful. Calm and cooperative. Flat affect  ABNL VS/O2: VSS on RA  MOBILITY: SBA with walker/GB.   PAIN MANAGMENT: Denies  DIET: 2g Na/low sat fat, good appetite  BOWEL/BLADDER: Continent.   ABNL LAB/BG: N/A  DRAIN/DEVICES: No IV access.  SKIN: Scattered bruising  TESTS/PROCEDURES: Covid test set today, negative.    D/C DAY/GOALS/PLACE: Pending commitment and eventual guardianship per psych note. SW following for disposition.  OTHER IMPORTANT INFO: On court hold, door alarm in place.

## 2021-03-07 NOTE — PLAN OF CARE
DATE & TIME: 3/6/21 8963-2828  Cognitive Concerns/ Orientation: A&O x3, disoriented to situation, forgetful. Calm and cooperative. Flat affect  ABNL VS/O2: VSS on RA  MOBILITY: SBA with walker/GB. Walked in halls with staff.   PAIN MANAGMENT: Denies  DIET: 2g Na/low sat fat, good appetite  BOWEL/BLADDER: Continent. BM x1 per pt report  ABNL LAB/BG: N/A  DRAIN/DEVICES: No IV access.  SKIN: Scattered bruising  TESTS/PROCEDURES: N/A   D/C DAY/GOALS/PLACE: pending commitment and eventual guardianship per psych note. On court hold, door alarm in place.   OTHER IMPORTANT INFO: SW following for disposition. Cooperative on shift and up watching movies.

## 2021-03-07 NOTE — PLAN OF CARE
DATE & TIME: 3/6/21 9401-6460  Cognitive Concerns/ Orientation: A&O x3, disoriented to situation, forgetful. Calm and cooperative. Flat affect  ABNL VS/O2: VSS on RA  MOBILITY: SBA with walker/GB.   PAIN MANAGMENT: Denies  DIET: 2g Na/low sat fat, good appetite  BOWEL/BLADDER: Continent.   ABNL LAB/BG: N/A  DRAIN/DEVICES: No IV access.  SKIN: Scattered bruising  TESTS/PROCEDURES: N/A   D/C DAY/GOALS/PLACE: pending commitment and eventual guardianship per psych note. On court hold, door alarm in place.   OTHER IMPORTANT INFO: SW following for disposition. Cooperative on shift and slept most of the shift.

## 2021-03-07 NOTE — PLAN OF CARE
DATE & TIME: 3/7/2021 7803-3701  Cognitive Concerns/ Orientation: A&O x3, disoriented to situation, forgetful. Calm and cooperative. Flat affect  ABNL VS/O2: VSS on RA  MOBILITY: SBA with walker/GB  PAIN MANAGMENT: Denies  DIET: 2g Na/low sat fat, good appetite  BOWEL/BLADDER: Continent. BM x1   ABNL LAB/BG: N/A  DRAIN/DEVICES: No IV access.  SKIN: Scattered bruising  TESTS/PROCEDURES: N/A   D/C DAY/GOALS/PLACE: pending commitment and eventual guardianship per psych note. On court hold, door alarm in place.   OTHER IMPORTANT INFO: SW following for disposition.      66

## 2021-03-08 PROCEDURE — 250N000013 HC RX MED GY IP 250 OP 250 PS 637: Performed by: INTERNAL MEDICINE

## 2021-03-08 PROCEDURE — 250N000013 HC RX MED GY IP 250 OP 250 PS 637: Performed by: STUDENT IN AN ORGANIZED HEALTH CARE EDUCATION/TRAINING PROGRAM

## 2021-03-08 PROCEDURE — 99233 SBSQ HOSP IP/OBS HIGH 50: CPT | Performed by: STUDENT IN AN ORGANIZED HEALTH CARE EDUCATION/TRAINING PROGRAM

## 2021-03-08 PROCEDURE — 250N000013 HC RX MED GY IP 250 OP 250 PS 637: Performed by: HOSPITALIST

## 2021-03-08 PROCEDURE — 120N000001 HC R&B MED SURG/OB

## 2021-03-08 RX ADMIN — ATORVASTATIN CALCIUM 40 MG: 40 TABLET, FILM COATED ORAL at 20:01

## 2021-03-08 RX ADMIN — AMLODIPINE BESYLATE 5 MG: 5 TABLET ORAL at 09:12

## 2021-03-08 RX ADMIN — ASPIRIN 325 MG ORAL TABLET 325 MG: 325 PILL ORAL at 09:13

## 2021-03-08 ASSESSMENT — ACTIVITIES OF DAILY LIVING (ADL)
ADLS_ACUITY_SCORE: 14
ADLS_ACUITY_SCORE: 13

## 2021-03-08 NOTE — PROGRESS NOTES
Met with patient to discuss discharge plans and plan of care. Patient now stating that he has a brother and sister in [place] and would want writer to locate and call them .Patient stating he had refused to give his family's names in the past as he did not want to bother them. He has not contacted his family for several years. Patient stating he has come to a point of realization that he will need to reach out to family members for assistance in making decision as he has noticed that his memory is failing.   Patient requested writer to call his neighbor's son Adarsh to bring his bills and check book as he feels he has a car payment and mortgage due. Writer spoke with Adarsh who will call patient and confirm the request. .  Stevenson Solitario RN  Inpatient Care Coordinator  [facility]a Lilia  #[phone]

## 2021-03-08 NOTE — PROGRESS NOTES
[facility]    Medicine Progress Note - Hospitalist Service       Date of Admission:  2/17/2021    Assessment & Plan       Jose Vazquez is a 75-year-old male with a medical history of recent stroke who presented to the emergency department on 2/17/2021 after he was found in the community down after a fall.  Further evaluation has suggested some impairment and a notable SLUMS core of 6/30.  Psych eval during this stay felt he is not decisional.   Patient is currently awaiting disposition.     On Court Hold          Fall, likely in the setting of physical deconditioning:  Patient not able to give any significant collateral history.  Based on his weakness and PT eval's, likely this was due to physical deconditioning. Overall improve at this time, continue PT and OT.   -TTE without any valvular abnormalities, telemetry NSR, laboratory work-up negative including TSH, CK, LFTs, troponin.  - tele NSR, discontinued earlier in stay.  - working with therapy  - Overall remain stable at this time.      Concern for cognitive impairment vs Acute encephalopathy (post stroke vs TBI vs other)  Concern for vulnerable adult  Patient previously living independently prior to his recent stroke.  After that he was discharged to a TCU which he left AMA from.  He does not recall the events leading to his AMA discharge.  Although he is oriented at times, significant concerns related to his decision-making ability. Unclear cause for this, likely progressive dementia with recent acute stroke worsening the situation.   This is a difficult situation with patient deemed not decisional on intial evaluation by psych and no next of kin or other surrogate decision makers. Patient states he has a neighbor Trent Escobar at [phone] who helps him, but is clear he has never had a medical power of .  He had wanted his neighbor/neighbors son to possibly be power of  but they ultimately did not feel comfortable with  requirements.     -See social work note on 2/26/2021, unfortunately Trent Escobar(neighbor) who previously had identified would be willing to take on the role of healthcare appointee to assist in healthcare decisions has decided to revoke that responsibility.  Therefore without next of kin and no other healthcare appointee to assist with medical decisions may need to proceed with guardianship.  Current healthcare directive for decision making recently filled out revoked as appointee has now declined.     -OT for cognitive eval (SLUMS 6/30 on Feb 19 and 10/30 on March 1)  -Social work consult, they will file vulnerable adult  -Psych consult on 3/1 opted to hold patient and pursue commitment and subsequent guardianship  -SW and CC are working to identify next of kin     Hematuria: resolved.   Noted on urinalysis for many years  -Follow-up with outpatient urology discharge     Concern for UTI earlier in stay:  -Patient denies urinary symptoms  -Urine culture with no organisms  -Discontinued ceftriaxone earlier in stay.       HTN:  -was Holding PTA amlodipine as BP controlled without it currently.    Now blood pressure running slightly on higher side, 139-144 systolic and  diastolic.  He was 10 mg of amlodipine at home, restarted amlodipine at 5 mg daily on 3/6  Blood pressure better control now.   .     Subacute left basal ganglia stroke with possible petechial hemorrhage within the above infarct  Chronic-appearing infarct in the inferior medial right cerebellum  Multiple probable chronic microhemorrhages within both cerebral hemispheres and cerebellum  Right carotid stenosis  Presented on 1/14/2021 with speech/language impairment and gait disturbance.  On review of chart patient was then offered TPA, he had declined.   A1c 5.6%, LDL 93. TTE - EF normal, normal sized atria  -Restarted PTA aspirin 325 mg oral daily  -outpatient f/u with neurology for carotid stenosis  - Continue Lipitor 40 mg daily and amlodipine 5  mg daily at this time.       THADDEUS, pre-renal, resolved  During admission last month noted to have a creatinine of 1.41 that improved to 1.01.  Have again presents with creatinine of 1.22.  Avoid nephrotoxic medications.   -Last CR WNL.  Eating, taking in fluids.      Chronic obstructive pulmonary disease history  He smoked for over 40 years, a pack a day, but quit a few years ago.  Not requiring any inhalers.  Monitor        Diet: Combination Diet Low Saturated Fat Na <2400mg Diet    DVT Prophylaxis: Ambulate every shift  Shen Catheter: not present  Code Status: Full Code           Disposition Plan   Expected discharge: patient is medically stable, but awaiting safe disposition at this time   Entered: Tyler Henderson MD 03/08/2021, 4:42 PM       The patient's care was discussed with the Bedside Nurse, Care Coordinator/ and Patient.    Time Spent on this Encounter   I spent 35 minutes on the unit/floor managing the care of Jose Vazquez. Over 50% of my time was spent on the following:   - Counseling the patient and/or family regarding: recommended follow-up and medical compliance  - Coordination of care with the: care coordinator/ and nurse    MD Tyler Galan MD  Hospitalist Service  [facility]  Contact information available via [facility] Paging/Directory    ______________________________________________________________________    Interval History   Seen around noon. Seen earlier in day walking  wilson. Has no complaints. Remains somewhat confused about why exactly he is here.     ROS otherwise negative.     Data reviewed today: I reviewed all medications, new labs and imaging results over the last 24 hours. I personally reviewed no images or EKG's today.    Physical Exam   Vital Signs: Temp: 97.6  F (36.4  C) Temp src: Oral BP: 113/84 Pulse: 88   Resp: 18 SpO2: 95 % O2 Device: None (Room air)    Weight: 163 lbs 11.2 oz  GEN:  Alert,  oriented , appears comfortable, no distress   HEENT:  Normocephalic/atraumatic, no scleral icterus, no nasal discharge, mouth moist.  EXT:  No edema.  No cyanosis.  No acute joint synovitis noted.  SKIN:  Dry to touch, no exanthems noted in the visualized areas.  Chest: good air entry bilaterally, no wheezing or crackles.   CVS: S1 and S2 normal.   Abd: soft and non tender.     Data   No lab results found in last 7 days.    No results found for this or any previous visit (from the past 24 hour(s)).  Medications       amLODIPine  5 mg Oral Daily     aspirin  325 mg Oral or NG Tube Daily     atorvastatin  40 mg Oral QPM

## 2021-03-08 NOTE — PLAN OF CARE
DATE & TIME: 3/7/2021 0793-9061  Cognitive Concerns/ Orientation: A&O x3, disoriented to situation, forgetful. Calm and cooperative. Flat affect  ABNL VS/O2: VSS on RA  MOBILITY: SBA with walker/GB  PAIN MANAGMENT: Denies  DIET: 2g Na/low sat fat, good appetite  BOWEL/BLADDER: Continent. BM x1   ABNL LAB/BG: N/A  DRAIN/DEVICES: No IV access.  SKIN: Scattered bruising  TESTS/PROCEDURES: N/A   D/C DAY/GOALS/PLACE: pending commitment and eventual guardianship per psych note. On court hold, door alarm in place.   OTHER IMPORTANT INFO: SW following for disposition. Ambulated in halls with staff. Second COVID test today; negative [see results].

## 2021-03-08 NOTE — PLAN OF CARE
PT- Attempted to see pt this AM, pt sitting EOB just starting to eat breakfast and declined any participation. Pt agreeable to walk in hallways with nursing staff.

## 2021-03-08 NOTE — PLAN OF CARE
DATE & TIME: 3/7/2021 7062-7297  Cognitive Concerns/ Orientation: A&O x3, disoriented to situation, forgetful. Calm and cooperative. Flat affect  ABNL VS/O2: VSS on RA  MOBILITY: SBA with walker/GB  PAIN MANAGMENT: Denies  DIET: 2g Na/low sat fat, good appetite  BOWEL/BLADDER: Continent.   ABNL LAB/BG: N/A  DRAIN/DEVICES: No IV access.  SKIN: Scattered bruising  TESTS/PROCEDURES: N/A   D/C DAY/GOALS/PLACE: pending commitment and eventual guardianship per psych note. On court hold, door alarm in place.   OTHER IMPORTANT INFO: SW following for disposition. Ambulated in BR with staff.

## 2021-03-08 NOTE — PROGRESS NOTES
Care Management Follow Up    Length of Stay (days): 18    Expected Discharge Date: 03/08/21(ltc)     Concerns to be Addressed: discharge planning     Patient plan of care discussed at interdisciplinary rounds: Yes    Anticipated Discharge Disposition: Long Term Care     Anticipated Discharge Services:    Anticipated Discharge DME:      Patient/family educated on Medicare website which has current facility and service quality ratings:    Education Provided on the Discharge Plan:    Patient/Family in Agreement with the Plan: no    Referrals Placed by CM/SW:    Private pay costs discussed:     Additional Information:  Today, patient participated in his Examination which is part of the Commitment process.  On March 11, he is scheduled for the Hearing which takes place over the phone.      TOÑO AlfonsoSW

## 2021-03-09 ENCOUNTER — APPOINTMENT (OUTPATIENT)
Dept: PHYSICAL THERAPY | Facility: CLINIC | Age: 76
DRG: 070 | End: 2021-03-09
Payer: COMMERCIAL

## 2021-03-09 ENCOUNTER — APPOINTMENT (OUTPATIENT)
Dept: OCCUPATIONAL THERAPY | Facility: CLINIC | Age: 76
DRG: 070 | End: 2021-03-09
Payer: COMMERCIAL

## 2021-03-09 PROCEDURE — 250N000013 HC RX MED GY IP 250 OP 250 PS 637: Performed by: INTERNAL MEDICINE

## 2021-03-09 PROCEDURE — 97535 SELF CARE MNGMENT TRAINING: CPT | Mod: GO

## 2021-03-09 PROCEDURE — 97129 THER IVNTJ 1ST 15 MIN: CPT

## 2021-03-09 PROCEDURE — 120N000001 HC R&B MED SURG/OB

## 2021-03-09 PROCEDURE — 97116 GAIT TRAINING THERAPY: CPT | Mod: GP

## 2021-03-09 PROCEDURE — 250N000013 HC RX MED GY IP 250 OP 250 PS 637: Performed by: HOSPITALIST

## 2021-03-09 PROCEDURE — 99233 SBSQ HOSP IP/OBS HIGH 50: CPT | Performed by: STUDENT IN AN ORGANIZED HEALTH CARE EDUCATION/TRAINING PROGRAM

## 2021-03-09 PROCEDURE — 250N000013 HC RX MED GY IP 250 OP 250 PS 637: Performed by: STUDENT IN AN ORGANIZED HEALTH CARE EDUCATION/TRAINING PROGRAM

## 2021-03-09 RX ADMIN — ATORVASTATIN CALCIUM 40 MG: 40 TABLET, FILM COATED ORAL at 20:37

## 2021-03-09 RX ADMIN — AMLODIPINE BESYLATE 5 MG: 5 TABLET ORAL at 08:51

## 2021-03-09 RX ADMIN — ASPIRIN 325 MG ORAL TABLET 325 MG: 325 PILL ORAL at 08:51

## 2021-03-09 ASSESSMENT — ACTIVITIES OF DAILY LIVING (ADL)
ADLS_ACUITY_SCORE: 13

## 2021-03-09 NOTE — PROGRESS NOTES
Care Management Follow Up    Writer able to locate patient's Brother Jaziel through [employer].  Writer spoke with Jaziel  Who was surprised to hear that his brother was in the hospital as he has not heard from him for over 2 years.   Jaziel  will be coming into the hospital to see patient. Jaziel stated he would be able to help his brother out.  Patient requested his brother to be the decision maker since his previous [facility]  had declined it   Writer spoke with chaplian regarding patients choices.   to assist with disposition.    Stevenson Solitario RN  Inpatient Care Coordinator  [facility]  #[phone]

## 2021-03-09 NOTE — PLAN OF CARE
DATE & TIME: 3/8/21, 7 am to 7 pm  Cognitive Concerns/ Orientation: A&O x3, disoriented to situation, forgetful. Calm and cooperative. Flat affect  ABNL VS/O2: VSS on RA  MOBILITY: SBA with walker/GB  PAIN MANAGMENT: Denies  DIET: 2g Na/low sat fat, good appetite, NEEDS ASSISTANCE in ordering food  BOWEL/BLADDER: Continent.   ABNL LAB/BG: N/A  DRAIN/DEVICES: No IV access  SKIN: bruised   TESTS/PROCEDURES: N/A   D/C DAY/GOALS/PLACE: pending commitment, On court hold  OTHER IMPORTANT INFO: SW following for disposition, [facility] Preliminary interview done today, Wants his brother Jaziel to be contacted, CC is trying to reach out, door alarm in place

## 2021-03-09 NOTE — PLAN OF CARE
Cognitive Concerns/ Orientation: A&O x3, disoriented to situation, forgetful. Calm and cooperative. Flat affect  ABNL VS/O2: VSS on RA  MOBILITY: SBA with walker/GB  PAIN MANAGMENT: Denies  DIET: 2g Na/low sat fat, good appetite, NEEDS ASSISTANCE in ordering food  BOWEL/BLADDER: Continent.   ABNL LAB/BG: N/A  DRAIN/DEVICES: No IV access  SKIN: bruised   TESTS/PROCEDURES: N/A   D/C DAY/GOALS/PLACE:  Psych to see in am re: continuing with commitment process [facility] family now present., On court hold  OTHER IMPORTANT INFO: SW following , brother Jaziel  contacted and is here visiting, CC is trying to reach out, door alarm in place.

## 2021-03-09 NOTE — PLAN OF CARE
DATE & TIME: 3/8/21 9010-8308  Cognitive Concerns/ Orientation: A&O x3, disoriented to situation, forgetful. Calm and cooperative. Flat affect  ABNL VS/O2: VSS on RA  MOBILITY: SBA with walker/GB  PAIN MANAGMENT: Denies  DIET: 2g Na/low sat fat, good appetite, NEEDS ASSISTANCE in ordering food  BOWEL/BLADDER: Continent.   ABNL LAB/BG: N/A  DRAIN/DEVICES: No IV access  SKIN: bruised   TESTS/PROCEDURES: N/A   D/C DAY/GOALS/PLACE: pending commitment, On court hold  OTHER IMPORTANT INFO: SW following for disposition, [facility] Preliminary interview done today, Wants his brother Jaziel to be contacted, CC is trying to reach out, door alarm in place

## 2021-03-09 NOTE — PROGRESS NOTES
Care Management Follow Up    Length of Stay (days): 19    Expected Discharge Date: 03/12/21     Concerns to be Addressed: discharge planning     Patient plan of care discussed at interdisciplinary rounds: Yes    Anticipated Discharge Disposition: Long Term Care     Anticipated Discharge Services:    Anticipated Discharge DME:      Patient/family educated on Medicare website which has current facility and service quality ratings:    Education Provided on the Discharge Plan:    Patient/Family in Agreement with the Plan: no    Referrals Placed by CM/SW:    Private pay costs discussed: Not applicable    Additional Information:  Today, patient's brother Jaziel Vazquez arrived.  Patient requested to assign Jaziel his HCA and this document was completed with the assistance of Spiritual [facility] staff.  Both writer and Dr Henderson met with patient and his brother Jaziel to review discharge plans   Writer explained patient does need 24 hour supervision due to short term memory loss as assessed by OT and lack of insight into his deficit and also that patient cannot drive.  PT has concerns with patient's balance and patient currently using the walker.  Patient denied balance problems and said he doesn't need the walker.  Explained if patient discharges directly to the brother's home as patient requests, we will recommend either home therapy or out patient therapy.  We also reviewed the option of returning to a TCU for further therapy.   Brother was able to witness patient's deficit.  He asked what medications patient is taking and patient stated he is only taking aspirin.  Writer went into his EMR and shared the medications patient is currently taking.  Brother has told patient to think about what he wants to do and brother will support his wish.   We also talked about the pending petition.  Writer contacted Dr Toledo who completed the Examiner Statement for Commitment and explained we now have a family member who is willing to take  responsibility for patient.    Dr Toledo wants to see patient in the morning and decide if the petition will be totally dropped or if he will recommend a continuance which allows the county to see how the relationship goes with patient and his brother over the next 60 days.  This was explained to patient and his brother.    Call placed to Moris Higgins at [phone].  Mr Higgins is the  who is representing the hospital petition.  Writer requested a return call to update him regarding the involvement of patient's brother.  Plan:  Tomorrow we will wait Dr Toledo's recommendation and writer will talk with patient and brother about disposition to brother's home vs TCU.       eHather Walker, TOÑOSW

## 2021-03-09 NOTE — PROGRESS NOTES
[facility]    Medicine Progress Note - Hospitalist Service       Date of Admission:  2/17/2021    Assessment & Plan       Jose Vazquez is a 75-year-old male with a medical history of recent stroke who presented to the emergency department on 2/17/2021 after he was found in the community down after a fall.  Further evaluation has suggested some impairment and a notable SLUMS core of 6/30.  Psych eval during this stay felt he is not decisional.   Patient is currently awaiting disposition.     On Court Hold         Fall, likely in the setting of physical deconditioning:  Patient not able to give any significant collateral history.  Based on his weakness and PT eval's, likely this was due to physical deconditioning. Overall improve at this time, continue PT and OT.   -TTE without any valvular abnormalities, telemetry NSR, laboratory work-up negative including TSH, CK, LFTs, troponin.  - tele NSR, discontinued earlier in stay.  - working with therapy  - Overall remain stable at this time.      Concern for cognitive impairment vs Acute encephalopathy (post stroke vs TBI vs other)  Concern for vulnerable adult  Patient previously living independently prior to his recent stroke.  After that he was discharged to a TCU which he left AMA from.  He does not recall the events leading to his AMA discharge.  Although he is oriented at times, significant concerns related to his decision-making ability. Unclear cause for this, likely progressive dementia with recent acute stroke worsening the situation.   This is a difficult situation with patient deemed not decisional on intial evaluation by psych and no next of kin or other surrogate decision makers. Patient states he has a neighbor Trent Escobar at [phone] who helps him, but is clear he has never had a medical power of .  He had wanted his neighbor/neighbors son to possibly be power of  but they ultimately did not feel comfortable with  requirements.     -OT for cognitive eval (SLUMS 6/30 on Feb 19 and 10/30 on March 1)  -Social work consult, they will file vulnerable adult  -Psych consult on 3/1 opted to hold patient and pursue commitment and subsequent guardianship  -SW and CC are working to identify next of kin     -brother Jaziel appointed as medical decision maker on 3/9/21  - consult to psych to assess if they can lift court hold  - will plan to discharge either to Jaziel's home or to TCU on 3/10/21 pending lifting of court hold.     Hematuria: resolved.   Noted on urinalysis for many years  -Follow-up with outpatient urology discharge     Concern for UTI earlier in stay:  -Patient denies urinary symptoms  -Urine culture with no organisms  -Discontinued ceftriaxone earlier in stay.       HTN:  -was Holding PTA amlodipine as BP controlled without it currently.    Now blood pressure running slightly on higher side, 139-144 systolic and  diastolic.  He was 10 mg of amlodipine at home, restarted amlodipine at 5 mg daily on 3/6  Blood pressure better control now.   .     Subacute left basal ganglia stroke with possible petechial hemorrhage within the above infarct  Chronic-appearing infarct in the inferior medial right cerebellum  Multiple probable chronic microhemorrhages within both cerebral hemispheres and cerebellum  Right carotid stenosis  Presented on 1/14/2021 with speech/language impairment and gait disturbance.  On review of chart patient was then offered TPA, he had declined.   A1c 5.6%, LDL 93. TTE - EF normal, normal sized atria  -Restarted PTA aspirin 325 mg oral daily  -outpatient f/u with neurology for carotid stenosis  - Continue Lipitor 40 mg daily and amlodipine 5 mg daily at this time.       THADDEUS, pre-renal, resolved  During admission last month noted to have a creatinine of 1.41 that improved to 1.01.  Have again presents with creatinine of 1.22.  Avoid nephrotoxic medications.   -Last CR WNL.  Eating, taking in fluids.       Chronic obstructive pulmonary disease history  He smoked for over 40 years, a pack a day, but quit a few years ago.  Not requiring any inhalers.  Monitor        Diet: Combination Diet Low Saturated Fat Na <2400mg Diet    DVT Prophylaxis: Ambulate every shift  Shen Catheter: not present  Code Status: Full Code           Disposition Plan   Expected discharge: patient is medically stable, but awaiting safe disposition at this time   Entered: Tyler Henderson MD 03/09/2021, 3:14 PM       The patient's care was discussed with the Bedside Nurse, Care Coordinator/, Patient and Patient's Family.    Time Spent on this Encounter   I spent 35 minutes on the unit/floor managing the care of Jose Vazquez. Over 50% of my time was spent on the following:   - Counseling the patient and/or family regarding: recommended follow-up and medical compliance  - Coordination of care with the: care coordinator/ and nurse    MD Tyler Galan MD  Hospitalist Service  [facility]  Contact information available via [facility] Paging/Directory    ______________________________________________________________________    Interval History   Seen around noon. Continues to demonstrate poor insight. Still not clear on why he is still hear.     Brother was found and appointed and medical POA. Will plan to discharge soon pending psych eval.    ROS otherwise negative.     Data reviewed today: I reviewed all medications, new labs and imaging results over the last 24 hours. I personally reviewed no images or EKG's today.    Physical Exam   Vital Signs: Temp: 97.2  F (36.2  C) Temp src: Oral BP: (!) 125/90 Pulse: 81   Resp: 16 SpO2: 96 % O2 Device: None (Room air)    Weight: 163 lbs 11.2 oz  GEN:  Alert, oriented , appears comfortable, no distress   HEENT:  Normocephalic/atraumatic, no scleral icterus, no nasal discharge, mouth moist.  EXT:  No edema.  No cyanosis.  No acute  joint synovitis noted.  SKIN:  Dry to touch, no exanthems noted in the visualized areas.  Chest: good air entry bilaterally, no wheezing or crackles.   CVS: S1 and S2 normal.   Abd: soft and non tender.     Data   No lab results found in last 7 days.    No results found for this or any previous visit (from the past 24 hour(s)).  Medications       amLODIPine  5 mg Oral Daily     aspirin  325 mg Oral or NG Tube Daily     atorvastatin  40 mg Oral QPM

## 2021-03-09 NOTE — PROGRESS NOTES
CLINICAL NUTRITION SERVICES - REASSESSMENT NOTE      Malnutrition: (3/3)  % Weight Loss:  Weight loss does not meet criteria for malnutrition   % Intake:  No decreased intake noted  Subcutaneous Fat Loss:  None observed  Muscle Loss:  None observed  Fluid Retention:  None noted     Malnutrition Diagnosis: Patient does not meet two of the above criteria necessary for diagnosing malnutrition       EVALUATION OF PROGRESS TOWARD GOALS   Diet: Low SF, 2 g Na    Intake/Tolerance: Good appetite and needs assistance ordering food per RN note. Pt has been receiving 2-3 meals per day averaging 1177 kcals and 50 g pro. % intakes documented in flow sheets. Pt reports good appetite and has been eating all of his meals.       NEW FINDINGS:   On court hold - Pending commitment and eventual guardianship per psych note.  Last BM 3/7  Labs: labs reviewed  Meds: PRN Zofran, Miralax  Weights: no updated weights  Wt Readings from Last 10 Encounters:   02/26/21 74.3 kg (163 lb 11.2 oz)   2/17/21 77/7 kg (171 lb 4.8 oz)    01/19/21 79 kg (174 lb 3.2 oz)   12/19/19 84.8 kg (187 lb)       Previous Goals:   Pt to consume >75% nutritionally adequate meals TID.  Evaluation: Met    Previous Nutrition Diagnosis:   No nutrition diagnosis at this time.  Evaluation: No change      CURRENT NUTRITION DIAGNOSIS  No nutrition diagnosis at this time.    INTERVENTIONS  Recommendations / Nutrition Prescription  Continue current diet    Implementation  None new at this time.    Goals  Pt to consume >75% nutritionally adequate meals BID-TID.      MONITORING AND EVALUATION:  Progress towards goals will be monitored and evaluated per protocol and Practice Guidelines    Ileana Arias  Dietetic Intern

## 2021-03-09 NOTE — PROGRESS NOTES
SPIRITUAL HEALTH SERVICES Progress Note  [facility]    Follow-Up request from nursing to update Health Care Directive.    I met with Hailejaison Garcia and his brother Jaziel, and Guadalupe, the Care Coordinator and notary; I brought the Advanced Directive form and assisted with answering any questions.    Guadalupe to follow thru with [employer].    SH continues to be available.    Morena Holloway  Chaplain Resident

## 2021-03-10 PROCEDURE — 250N000013 HC RX MED GY IP 250 OP 250 PS 637: Performed by: INTERNAL MEDICINE

## 2021-03-10 PROCEDURE — 99233 SBSQ HOSP IP/OBS HIGH 50: CPT | Performed by: STUDENT IN AN ORGANIZED HEALTH CARE EDUCATION/TRAINING PROGRAM

## 2021-03-10 PROCEDURE — 250N000013 HC RX MED GY IP 250 OP 250 PS 637: Performed by: STUDENT IN AN ORGANIZED HEALTH CARE EDUCATION/TRAINING PROGRAM

## 2021-03-10 PROCEDURE — 120N000001 HC R&B MED SURG/OB

## 2021-03-10 PROCEDURE — 250N000013 HC RX MED GY IP 250 OP 250 PS 637: Performed by: HOSPITALIST

## 2021-03-10 PROCEDURE — 99232 SBSQ HOSP IP/OBS MODERATE 35: CPT | Performed by: PSYCHIATRY & NEUROLOGY

## 2021-03-10 RX ADMIN — ASPIRIN 325 MG ORAL TABLET 325 MG: 325 PILL ORAL at 08:21

## 2021-03-10 RX ADMIN — AMLODIPINE BESYLATE 5 MG: 5 TABLET ORAL at 08:21

## 2021-03-10 RX ADMIN — ATORVASTATIN CALCIUM 40 MG: 40 TABLET, FILM COATED ORAL at 21:25

## 2021-03-10 ASSESSMENT — ACTIVITIES OF DAILY LIVING (ADL)
ADLS_ACUITY_SCORE: 13

## 2021-03-10 NOTE — CONSULTS
"[facility] Psychiatric Consult Progress Note    Interval History:   Pt seen, chart reviewed, case discussed with nursing staff and treating clinicians.  I met with Jose on station 66.  Chart review indicates that he has shown some improvement in his cognitive status.  Today he knows that it is Wednesday, 10 March.,  Able to articulate that he has been in the hospital for 3 weeks.  He mentioned that he is going to go live with his brother in [place].  It appears that there is some concern about his gait, there have been ongoing discussions about providing additional physical therapy, possibly recommending a TCU.  The patient was a bit guarded with me but clearly in a better place in comparison to where he was when he came into the hospital.  He indicates some difficulty with sleep \"because I am in the hospital\", denies any thoughts of wanting to hurt self or others.  He is not overtly agitated or psychotic.  At this juncture I think it would be reasonable to for a continuance, with the option for dismissal within the next 60 days if he continues to do well.  I think it remains to be seen if he will fully cooperate with recommendations.  Given the fact that he is guarded, still has some cognitive deficits based on his slums evaluation, dropping the petition will only lead to chaos if he becomes more uncooperative.     Review of systems:   10 point Review of Systems completed by Dr. Toledo, and is  is negative other than noted in the HPI     Medications:       amLODIPine  5 mg Oral Daily     aspirin  325 mg Oral or NG Tube Daily     atorvastatin  40 mg Oral QPM     acetaminophen, acetaminophen, bisacodyl, hydrALAZINE, melatonin, ondansetron **OR** ondansetron, polyethylene glycol, sodium chloride (PF), sodium chloride (PF)    Mental Status Examination:     Appearance:  awake, alert, adequately groomed, dressed in hospital scrubs and appeared as age stated, he has a slightly guarded " demeanor  Eye Contact:  intense  Speech:  clear, coherent  Language:Normal  Psychomotor Behavior:  no evidence of tardive dyskinesia, dystonia, or tics  Mood:  good  Affect:  intensity is flat  Thought Process:  logical, linear and goal oriented no loose associations  Thought Content:  no evidence of suicidal ideation or homicidal ideation and no evidence of psychotic thought  Oriented to:  time, person, and place  Attention Span and Concentration:  fair  Recent and Remote Memory:  poor per slums evaluation though there is some improvement noted  Fund of Knowledge: appropriate  Muscle Strength and Tone: normal  Gait and Station: Normal  Insight:  fair  Judgment:  fair        Labs/Vitals:   No results found for this or any previous visit (from the past 24 hour(s)).  B/P: 130/99, T: 97.3, P: 100, R: 18    Impression:   oJse is a 75-year-old gentleman status post cerebrovascular accident and fall currently going through a commitment process due to concerns about self-care and limited decisional capacity.  He has shown significant improvement in his cognitive status though remains impaired.  I would suggest a continuance for dismissal, and agree that having him live with his brother noting that he needs 24-hour supervision is a reasonable alternative.  I think there is a chance that he could become uncooperative, he remains a bit guarded so simply dropping the petition would not be ideal due to the fact that we would have no recourse if he suddenly decided he was not going to agree to recommendations.    DIagnoses:   1.  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  Recent fall with cerebral contusion  3.  COPD  4.  Hypertension  5.  Chronic kidney disease         Plan:   1.  At this juncture I think a continuance for dismissal with him 60 days it is reasonable to give the patient a chance to settle into his new environment under the care of his brother   2.  No specific psychotropic medications indicated at this  time  3.  I think he can be discharged to his brother's care once this plan is put in place, understanding that he needs 24-hour supervision and cannot drive.  Resumption of driving privileges would be dependent on his ability to pass a 's evaluation in the future      Attestation:  Patient has been seen and evaluated by me,  Gentry Toledo MD

## 2021-03-10 NOTE — PROGRESS NOTES
[facility]    Medicine Progress Note - Hospitalist Service       Date of Admission:  2/17/2021    Assessment & Plan       Jose Vazquez is a 75-year-old male with a medical history of recent stroke who presented to the emergency department on 2/17/2021 after he was found in the community down after a fall.  Further evaluation has suggested some impairment and a notable SLUMS core of 6/30.  Psych eval during this stay felt he is not decisional for complex medical decisions.   Patient is currently awaiting disposition.     On Court Hold       Fall, likely in the setting of physical deconditioning and recent CVA:  Patient not able to give any significant collateral history.  Based on his weakness and PT eval's, likely this was due to physical deconditioning. Overall improve at this time, continue PT and OT.   -TTE without any valvular abnormalities, telemetry NSR, laboratory work-up negative including TSH, CK, LFTs, troponin.  - tele NSR, discontinued earlier in stay.  - working with therapy  - Overall remain stable at this time.      Concern for cognitive impairment vs Acute encephalopathy (post stroke vs TBI vs other)  Concern for vulnerable adult  Patient previously living independently prior to his recent stroke.  After that he was discharged to a TCU which he left AMA from.  He does not recall the events leading to his AMA discharge.  Although he is oriented at times, significant concerns related to his decision-making ability. 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   This is a difficult situation with patient deemed not decisional for complex decisions. Eventually were able to identify a brother, Jaziel Vazquez, who Jose appointed as medical decision maker on 3/9/21.   - OT for cognitive eval (SLUMS 6/30 on Feb 19 and 10/30 on March 1)  - Social work consult, they will file vulnerable adult  - Psych consult on 3/1 opted to hold patient and pursue  commitment and subsequent guardianship  - SW and CC are working to identify next of kin   - brother Jaziel appointed as medical decision maker on 3/9/21  - plan to discharge to TCU  - pending court hearing on 3/11, will plan on continuance for dismissal with discharge after    Hematuria: resolved.   Noted on urinalysis for many years  -Follow-up with outpatient urology discharge     Concern for UTI earlier in stay:  -Patient denies urinary symptoms  -Urine culture with no organisms  -Discontinued ceftriaxone earlier in stay.       HTN:  -was Holding PTA amlodipine as BP controlled without it currently.    Now blood pressure running slightly on higher side, 139-144 systolic and  diastolic.  He was 10 mg of amlodipine at home, restarted amlodipine at 5 mg daily on 3/6  Blood pressure better control now.   .     Subacute left basal ganglia stroke with possible petechial hemorrhage within the above infarct  Chronic-appearing infarct in the inferior medial right cerebellum  Multiple probable chronic microhemorrhages within both cerebral hemispheres and cerebellum  Right carotid stenosis  Presented on 1/14/2021 with speech/language impairment and gait disturbance.  On review of chart patient was then offered TPA, he had declined.   A1c 5.6%, LDL 93. TTE - EF normal, normal sized atria  -Restarted PTA aspirin 325 mg oral daily  -outpatient f/u with neurology for carotid stenosis  - Continue Lipitor 40 mg daily and amlodipine 5 mg daily at this time.       THADDEUS, pre-renal, resolved  During admission last month noted to have a creatinine of 1.41 that improved to 1.01.  Have again presents with creatinine of 1.22.  Avoid nephrotoxic medications.   -Last CR WNL.  Eating, taking in fluids.      Chronic obstructive pulmonary disease history  He smoked for over 40 years, a pack a day, but quit a few years ago.  Not requiring any inhalers.  Monitor        Diet: Combination Diet Low Saturated Fat Na <2400mg Diet    DVT  "Prophylaxis: Ambulate every shift  Shen Catheter: not present  Code Status: Full Code           Disposition Plan   Expected discharge: patient is medically stable, but awaiting safe disposition at this time   Entered: Tyler Henderson MD 03/10/2021, 2:57 PM       The patient's care was discussed with the Bedside Nurse, Care Coordinator/, Patient and Patient's Family.    Time Spent on this Encounter   I spent 35 minutes on the unit/floor managing the care of Jose Vazquez. Over 50% of my time was spent on the following:   - Counseling the patient and/or family regarding: recommended follow-up and medical compliance  - Coordination of care with the: care coordinator/ and nurse    MD Tyler Galan MD  Hospitalist Service  [facility]  Contact information available via [facility] Paging/Directory    ______________________________________________________________________    Interval History   Seen in AM. Patient very frustrated that he won't be able to go and stay at his brothers. He is willing to go to TCU, but \"I won't like it.\"    ROS otherwise negative.     Data reviewed today: I reviewed all medications, new labs and imaging results over the last 24 hours. I personally reviewed no images or EKG's today.    Physical Exam   Vital Signs: Temp: 97.6  F (36.4  C) Temp src: Oral BP: (!) 134/90 Pulse: 86   Resp: 16 SpO2: 95 % O2 Device: None (Room air)    Weight: 163 lbs 11.2 oz  GEN:  Alert, oriented , appears comfortable, no distress   HEENT:  Normocephalic/atraumatic, no scleral icterus, no nasal discharge, mouth moist.  EXT:  No edema.  No cyanosis.  No acute joint synovitis noted.  SKIN:  Dry to touch, no exanthems noted in the visualized areas.  Chest: good air entry bilaterally, no wheezing or crackles.   CVS: S1 and S2 normal.   Abd: soft and non tender.     Data   No lab results found in last 7 days.    No results found for this or any " previous visit (from the past 24 hour(s)).  Medications       amLODIPine  5 mg Oral Daily     aspirin  325 mg Oral or NG Tube Daily     atorvastatin  40 mg Oral QPM

## 2021-03-10 NOTE — PROGRESS NOTES
Care Management Follow Up    Length of Stay (days): 20    Expected Discharge Date: 03/10/21     Concerns to be Addressed: discharge planning     Patient plan of care discussed at interdisciplinary rounds: Yes    Anticipated Discharge Disposition: Long Term Care     Anticipated Discharge Services:    Anticipated Discharge DME:      Patient/family educated on Medicare website which has current facility and service quality ratings:    Education Provided on the Discharge Plan:    Patient/Family in Agreement with the Plan: no    Referrals Placed by CM/SW:    Private pay costs discussed: Not applicable    Additional Information:  Patient's brother rescinded his offer for patient to come and live with him and his spouse.  He doesn't believe they can provide the level of assistance patient will require.  He does remain willing to be patient's HCA.  Today a Continuance with follow up for the hospital petition was proposed to patient and he wanted to speak with his .  He told writer early afternoon he would call his court appointed .  At this time patient reports he called but couldn't reach the  and did not leave a message.   Writer also contacted [facility] to detemine if patient could return there short term until a longer term living situation could be implemented.  Writer did not receive a return call.  At this time, because patient did not sign the continuance order, he remains under a COURT HOLD and needs to be kept here.   Will call his brother to update him.       TOÑO AlfonsoSW

## 2021-03-10 NOTE — PLAN OF CARE
"PT- Pt declined all OOB mobility. Encouragement provided but pt stated \"I am not up to go for a walk right now.\" Education provided on ambulating with nursing staff throughout the day as able, pt agreeable.   "

## 2021-03-10 NOTE — PLAN OF CARE
Airway  Performed by: Shaun Gonzalez MD  Authorized by: Shaun Gonzalez MD     Final Airway Type:  Endotracheal airway  Final Endotracheal Airway*:  ETT  ETT Size (mm)*:  7.5  Cuff*:  Regular  Technique Used for Successful ETT Placement:  Direct laryngoscopy  Devices/Methods Used in Placement*:  Oral ETT  Intubation Procedure*:  ETCO2, Preoxygenation and Cricoid Pressure  Blade Type*:  MAC  Blade Size*:  4  Measured from*:  Lips  Secured at (cm)*:  25  Placement Verified by: auscultation and capnometry    Glottic View*:  2 - partial view of glottis  Attempts*:  1         DATE & TIME: 3/9/21 7559-0151  Cognitive Concerns/ Orientation: A&O x3, disoriented to situation, forgetful. Calm and cooperative. Flat affect  ABNL VS/O2: VSS on RA  MOBILITY: SBA with walker/GB  PAIN MANAGMENT: Denies  DIET: 2g Na/low sat fat, good appetite, NEEDS ASSISTANCE in ordering food  BOWEL/BLADDER: Continent.   ABNL LAB/BG: N/A  DRAIN/DEVICES: No IV access  SKIN: bruised   TESTS/PROCEDURES: N/A   D/C DAY/GOALS/PLACE:  Psych to see in am re: continuing with commitment process [facility] family now present., On court hold  OTHER IMPORTANT INFO: SW following , brother Jaziel  contacted and is here visiting, CC is trying to reach out, door alarm in place.

## 2021-03-11 ENCOUNTER — APPOINTMENT (OUTPATIENT)
Dept: PHYSICAL THERAPY | Facility: CLINIC | Age: 76
DRG: 070 | End: 2021-03-11
Payer: COMMERCIAL

## 2021-03-11 PROCEDURE — 250N000013 HC RX MED GY IP 250 OP 250 PS 637: Performed by: HOSPITALIST

## 2021-03-11 PROCEDURE — 120N000001 HC R&B MED SURG/OB

## 2021-03-11 PROCEDURE — 250N000013 HC RX MED GY IP 250 OP 250 PS 637: Performed by: INTERNAL MEDICINE

## 2021-03-11 PROCEDURE — 99233 SBSQ HOSP IP/OBS HIGH 50: CPT | Performed by: STUDENT IN AN ORGANIZED HEALTH CARE EDUCATION/TRAINING PROGRAM

## 2021-03-11 PROCEDURE — 97116 GAIT TRAINING THERAPY: CPT | Mod: GP

## 2021-03-11 PROCEDURE — 250N000013 HC RX MED GY IP 250 OP 250 PS 637: Performed by: STUDENT IN AN ORGANIZED HEALTH CARE EDUCATION/TRAINING PROGRAM

## 2021-03-11 RX ADMIN — ATORVASTATIN CALCIUM 40 MG: 40 TABLET, FILM COATED ORAL at 20:45

## 2021-03-11 RX ADMIN — AMLODIPINE BESYLATE 5 MG: 5 TABLET ORAL at 08:53

## 2021-03-11 RX ADMIN — ASPIRIN 325 MG ORAL TABLET 325 MG: 325 PILL ORAL at 08:53

## 2021-03-11 ASSESSMENT — ACTIVITIES OF DAILY LIVING (ADL)
ADLS_ACUITY_SCORE: 13

## 2021-03-11 NOTE — PROGRESS NOTES
[facility]    Medicine Progress Note - Hospitalist Service       Date of Admission:  2/17/2021    Assessment & Plan       Jose Vazquez is a 75-year-old male with a medical history of recent stroke who presented to the emergency department on 2/17/2021 after he was found in the community down after a fall.  Further evaluation has suggested some impairment and a notable SLUMS core of 6/30.  Psych eval during this stay felt he is not decisional for complex medical decisions.   Patient is currently awaiting disposition.     On Court Hold       Fall, likely in the setting of physical deconditioning and recent CVA:  Patient not able to give any significant collateral history.  Based on his weakness and PT eval's, likely this was due to physical deconditioning. Overall improve at this time, continue PT and OT.   -TTE without any valvular abnormalities, telemetry NSR, laboratory work-up negative including TSH, CK, LFTs, troponin.  - tele NSR, discontinued earlier in stay.  - working with therapy  - Overall remain stable at this time.      Concern for cognitive impairment vs Acute encephalopathy (post stroke vs TBI vs other)  Concern for vulnerable adult  Patient previously living independently prior to his recent stroke.  After that he was discharged to a TCU which he left AMA from.  He does not recall the events leading to his AMA discharge.  Although he is oriented at times, significant concerns related to his decision-making ability. 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   This is a difficult situation with patient deemed not decisional for complex decisions. Eventually were able to identify a brother, Jaziel Vazquez, who Jose appointed as medical decision maker on 3/9/21.   - OT for cognitive eval (SLUMS 6/30 on Feb 19 and 10/30 on March 1)  - Social work consult, they will file vulnerable adult  - Psych consult on 3/1 opted to hold patient and pursue  commitment and subsequent guardianship  - SW and CC are working to identify next of kin   - brother Jaziel appointed as medical decision maker on 3/9/21  - plan to discharge to TCU  - on continuance for dismissal as of 3/11/21. Patient can discharge as soon as he has accepting facility    Hematuria: resolved.   Noted on urinalysis for many years  -Follow-up with outpatient urology discharge     Concern for UTI earlier in stay:  -Patient denies urinary symptoms  -Urine culture with no organisms  -Discontinued ceftriaxone earlier in stay.       HTN:  -was Holding PTA amlodipine as BP controlled without it currently.    Now blood pressure running slightly on higher side, 139-144 systolic and  diastolic.  He was 10 mg of amlodipine at home, restarted amlodipine at 5 mg daily on 3/6  Blood pressure better control now.      Subacute left basal ganglia stroke with possible petechial hemorrhage within the above infarct  Chronic-appearing infarct in the inferior medial right cerebellum  Multiple probable chronic microhemorrhages within both cerebral hemispheres and cerebellum  Right carotid stenosis  Presented on 1/14/2021 with speech/language impairment and gait disturbance.  On review of chart patient was then offered TPA, he had declined.   A1c 5.6%, LDL 93. TTE - EF normal, normal sized atria  -Restarted PTA aspirin 325 mg oral daily  -outpatient f/u with neurology for carotid stenosis  - Continue Lipitor 40 mg daily and amlodipine 5 mg daily at this time.       THADDEUS, pre-renal, resolved  During admission last month noted to have a creatinine of 1.41 that improved to 1.01.  Have again presents with creatinine of 1.22.  Avoid nephrotoxic medications.   -Last CR WNL.  Eating, taking in fluids.      Chronic obstructive pulmonary disease history  He smoked for over 40 years, a pack a day, but quit a few years ago.  Not requiring any inhalers.  Monitor        Diet: Combination Diet Low Saturated Fat Na <2400mg  "Diet  Advance Diet as Tolerated    DVT Prophylaxis: Ambulate every shift  Shen Catheter: not present  Code Status: Full Code           Disposition Plan   Expected discharge: patient is medically stable, but awaiting safe disposition at this time   Entered: Tyler Henderson MD 03/11/2021, 2:58 PM       The patient's care was discussed with the Bedside Nurse, Care Coordinator/ and Patient.    Time Spent on this Encounter   I spent 35 minutes on the unit/floor managing the care of Jose Vazquez. Over 50% of my time was spent on the following:   - Counseling the patient and/or family regarding: recommended follow-up and medical compliance  - Coordination of care with the: care coordinator/ and nurse    MD Tyler Galan MD  Hospitalist Service  [facility]  Contact information available via [facility] Paging/Directory    ______________________________________________________________________    Interval History   Seen in PM. \"This is unconscionable\". Patient is still very frustrated that he won't be able to go and stay at his brothers. He remains willing to go to TCU. Laments everything that has happened. Still can't quite explain what has happened.    ROS otherwise negative.     Data reviewed today: I reviewed all medications, new labs and imaging results over the last 24 hours. I personally reviewed no images or EKG's today.    Physical Exam   Vital Signs: Temp: 97.7  F (36.5  C) Temp src: Oral BP: (!) 139/91 Pulse: 98   Resp: 16 SpO2: 94 % O2 Device: None (Room air)    Weight: 163 lbs 11.2 oz  GEN:  Alert, oriented , appears comfortable, no distress   HEENT:  Normocephalic/atraumatic, no scleral icterus, no nasal discharge, mouth moist.  EXT:  No edema.  No cyanosis.  No acute joint synovitis noted.  SKIN:  Dry to touch, no exanthems noted in the visualized areas.  Chest: good air entry bilaterally, no wheezing or crackles.   CVS: S1 and S2 " normal.   Abd: soft and non tender.     Data   No lab results found in last 7 days.    No results found for this or any previous visit (from the past 24 hour(s)).  Medications       amLODIPine  5 mg Oral Daily     aspirin  325 mg Oral or NG Tube Daily     atorvastatin  40 mg Oral QPM

## 2021-03-12 ENCOUNTER — APPOINTMENT (OUTPATIENT)
Dept: PHYSICAL THERAPY | Facility: CLINIC | Age: 76
DRG: 070 | End: 2021-03-12
Payer: COMMERCIAL

## 2021-03-12 ENCOUNTER — APPOINTMENT (OUTPATIENT)
Dept: OCCUPATIONAL THERAPY | Facility: CLINIC | Age: 76
DRG: 070 | End: 2021-03-12
Payer: COMMERCIAL

## 2021-03-12 PROCEDURE — 120N000001 HC R&B MED SURG/OB

## 2021-03-12 PROCEDURE — 97116 GAIT TRAINING THERAPY: CPT | Mod: GP

## 2021-03-12 PROCEDURE — 250N000013 HC RX MED GY IP 250 OP 250 PS 637: Performed by: HOSPITALIST

## 2021-03-12 PROCEDURE — 99232 SBSQ HOSP IP/OBS MODERATE 35: CPT | Performed by: STUDENT IN AN ORGANIZED HEALTH CARE EDUCATION/TRAINING PROGRAM

## 2021-03-12 PROCEDURE — 97535 SELF CARE MNGMENT TRAINING: CPT | Mod: GO | Performed by: OCCUPATIONAL THERAPIST

## 2021-03-12 PROCEDURE — 250N000013 HC RX MED GY IP 250 OP 250 PS 637: Performed by: INTERNAL MEDICINE

## 2021-03-12 RX ADMIN — AMLODIPINE BESYLATE 5 MG: 5 TABLET ORAL at 08:15

## 2021-03-12 RX ADMIN — ASPIRIN 325 MG ORAL TABLET 325 MG: 325 PILL ORAL at 08:14

## 2021-03-12 ASSESSMENT — ACTIVITIES OF DAILY LIVING (ADL)
ADLS_ACUITY_SCORE: 13

## 2021-03-12 NOTE — PLAN OF CARE
Cognitive Concerns/ Orientation: A&O x3, disoriented to situation, forgetful. Calm and cooperative.   ABNL VS/O2: VSS on RA.   MOBILITY: independent in room  PAIN MANAGMENT: Denies pain   DIET: 2g Na/low sat fat, good appetite  BOWEL/BLADDER: Continent, up to bathroom   ABNL LAB/BG: no new labs   DRAIN/DEVICES: n/a, no IV access  SKIN: bruises, intact  TESTS/PROCEDURES: n/a  D/C DAY/GOALS/PLACE:  Pending placement, back to [facility] vs. TCU, SW following for placement   OTHER IMPORTANT INFO: Voluntary status. PT/OT following.

## 2021-03-12 NOTE — PLAN OF CARE
DATE & TIME: 3/11/21 8406-6621  Cognitive Concerns/ Orientation: A&O x3, disoriented to situation, forgetful.   ABNL VS/O2: VSS on RA.   MOBILITY: independent in room  PAIN MANAGMENT: Denies  DIET: 2g Na/low sat fat, good appetite, NEEDS ASSISTANCE in ordering food.  BOWEL/BLADDER: Continent.   ABNL LAB/BG: N/A  DRAIN/DEVICES: No IV access  SKIN: bruised   TESTS/PROCEDURES: N/A   D/C DAY/GOALS/PLACE: Plan is discharge to TCU tomorrow.  OTHER IMPORTANT INFO: ARASH mobley , brother Jaziel contacted and visited. Door alarm removed as pt is now considered voluntary. Calm and cooperative.

## 2021-03-12 NOTE — PLAN OF CARE
DATE & TIME: 3/10/21 6844-7057  Cognitive Concerns/ Orientation: A&O x3, disoriented to situation, forgetful. Calm and cooperative. Flat affect  ABNL VS/O2: VSS on RA. Refused night shift vitals.  MOBILITY: independent in room  PAIN MANAGMENT: Denies  DIET: 2g Na/low sat fat, good appetite, NEEDS ASSISTANCE in ordering food  BOWEL/BLADDER: Continent.   ABNL LAB/BG: N/A  DRAIN/DEVICES: No IV access  SKIN: bruised   TESTS/PROCEDURES: N/A   D/C DAY/GOALS/PLACE:   Will be dischRGED TO tcu TODAY OR TOMORROW.  OTHER IMPORTANT INFO: ARASH mobley , brother Jaziel  contacted and is here visiting.  door alarm removed as pt is now considered voluntary.

## 2021-03-12 NOTE — PROGRESS NOTES
[facility]    Medicine Progress Note - Hospitalist Service       Date of Admission:  2/17/2021    Assessment & Plan       Jose Vazquez is a 75-year-old male with a medical history of recent stroke who presented to the emergency department on 2/17/2021 after he was found in the community down after a fall.  Further evaluation has suggested some impairment and a notable SLUMS core of 6/30.  Psych eval during this stay felt he is not decisional for complex medical decisions.  He is currently on continuance for dismissal from legal standpoint and has appointed his brother and medical agent. Unfortunately cannot leave as no payer source (no financial POA appointed so facilities reluctant to take as likely will only be authorized for short stay). Unable to appoint a financial POA because he has not 's license or appropriate identification.   Patient is currently awaiting disposition.     On Court Hold       Fall, likely in the setting of physical deconditioning and recent CVA:  Patient not able to give any significant collateral history.  Based on his weakness and PT eval's, likely this was due to physical deconditioning. Overall improve at this time, continue PT and OT.   -TTE without any valvular abnormalities, telemetry NSR, laboratory work-up negative including TSH, CK, LFTs, troponin.  - tele NSR, discontinued earlier in stay.  - working with therapy  - Overall remain stable at this time.      Concern for cognitive impairment vs Acute encephalopathy (post stroke vs TBI vs other)  Concern for vulnerable adult  Patient previously living independently prior to his recent stroke.  After that he was discharged to a TCU which he left AMA from.  He does not recall the events leading to his AMA discharge.  Although he is oriented at times, significant concerns related to his decision-making ability. 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   This is a  difficult situation with patient deemed not decisional for complex decisions. Eventually were able to identify a brother, Jaziel Vazquez, who Jose appointed as medical decision maker on 3/9/21.   Does not have financial POA appointed at this point.  - OT for cognitive eval (SLUMS 6/30 on Feb 19 and 10/30 on March 1)  - Social work consult, they will file vulnerable adult  - brother Jaziel appointed as medical decision maker on 3/9/21  - plan to discharge to TCU  - Psych consult on 3/1 opted to hold patient and pursue commitment and subsequent guardianship  - on continuance for dismissal as of 3/11/21. Patient can discharge as soon as he has accepting facility    Hematuria: resolved.   Noted on urinalysis for many years  -Follow-up with outpatient urology discharge     Concern for UTI earlier in stay:  -Patient denies urinary symptoms  -Urine culture with no organisms  -Discontinued ceftriaxone earlier in stay.       HTN:  -was Holding PTA amlodipine as BP controlled without it currently.    Now blood pressure running slightly on higher side, 139-144 systolic and  diastolic.  He was 10 mg of amlodipine at home, restarted amlodipine at 5 mg daily on 3/6  Blood pressure better control now.      Subacute left basal ganglia stroke with possible petechial hemorrhage within the above infarct  Chronic-appearing infarct in the inferior medial right cerebellum  Multiple probable chronic microhemorrhages within both cerebral hemispheres and cerebellum  Right carotid stenosis  Presented on 1/14/2021 with speech/language impairment and gait disturbance.  On review of chart patient was then offered TPA, he had declined.   A1c 5.6%, LDL 93. TTE - EF normal, normal sized atria  -Restarted PTA aspirin 325 mg oral daily  -outpatient f/u with neurology for carotid stenosis  - Continue Lipitor 40 mg daily and amlodipine 5 mg daily at this time.       THADDEUS, pre-renal, resolved  During admission last month noted to have a creatinine of  1.41 that improved to 1.01.  Have again presents with creatinine of 1.22.  Avoid nephrotoxic medications.   -Last CR WNL.  Eating, taking in fluids.      Chronic obstructive pulmonary disease history  He smoked for over 40 years, a pack a day, but quit a few years ago.  Not requiring any inhalers.  Monitor        Diet: Combination Diet Low Saturated Fat Na <2400mg Diet  Advance Diet as Tolerated    DVT Prophylaxis: Ambulate every shift  Shen Catheter: not present  Code Status: Full Code           Disposition Plan   Expected discharge: patient is medically stable, but awaiting safe disposition at this time   Entered: Tyler Henderson MD 03/12/2021, 5:06 PM       The patient's care was discussed with the Bedside Nurse, Care Coordinator/ and Patient.    Time Spent on this Encounter   I spent 25 minutes on the unit/floor managing the care of Jose Vazquez. Over 50% of my time was spent on the following:   - Counseling the patient and/or family regarding: recommended follow-up and medical compliance  - Coordination of care with the: care coordinator/ and nurse    MD Tyler Galan MD  Hospitalist Service  [facility]  Contact information available via [facility] Paging/Directory    ______________________________________________________________________    Interval History   Seen in PM. Still upset with being here. Discussed dispo plan. He still wants to go home.    ROS otherwise negative.     Data reviewed today: I reviewed all medications, new labs and imaging results over the last 24 hours. I personally reviewed no images or EKG's today.    Physical Exam   Vital Signs: Temp: 97.6  F (36.4  C) Temp src: Oral BP: 112/74 Pulse: 85   Resp: 18 SpO2: 96 % O2 Device: None (Room air)    Weight: 163 lbs 11.2 oz  GEN:  Alert, oriented , appears comfortable, no distress   HEENT:  Normocephalic/atraumatic, no scleral icterus, no nasal discharge, mouth  moist.  EXT:  No edema.  No cyanosis.  No acute joint synovitis noted.  SKIN:  Dry to touch, no exanthems noted in the visualized areas.  Chest: good air entry bilaterally, no wheezing or crackles.   CVS: S1 and S2 normal.   Abd: soft and non tender.     Data   No lab results found in last 7 days.    No results found for this or any previous visit (from the past 24 hour(s)).  Medications       amLODIPine  5 mg Oral Daily     aspirin  325 mg Oral or NG Tube Daily     atorvastatin  40 mg Oral QPM

## 2021-03-13 ENCOUNTER — APPOINTMENT (OUTPATIENT)
Dept: OCCUPATIONAL THERAPY | Facility: CLINIC | Age: 76
DRG: 070 | End: 2021-03-13
Payer: COMMERCIAL

## 2021-03-13 PROCEDURE — 97535 SELF CARE MNGMENT TRAINING: CPT | Mod: GO | Performed by: OCCUPATIONAL THERAPIST

## 2021-03-13 PROCEDURE — 250N000013 HC RX MED GY IP 250 OP 250 PS 637: Performed by: HOSPITALIST

## 2021-03-13 PROCEDURE — 250N000013 HC RX MED GY IP 250 OP 250 PS 637: Performed by: INTERNAL MEDICINE

## 2021-03-13 PROCEDURE — 120N000001 HC R&B MED SURG/OB

## 2021-03-13 PROCEDURE — 250N000013 HC RX MED GY IP 250 OP 250 PS 637: Performed by: STUDENT IN AN ORGANIZED HEALTH CARE EDUCATION/TRAINING PROGRAM

## 2021-03-13 PROCEDURE — 99232 SBSQ HOSP IP/OBS MODERATE 35: CPT | Performed by: INTERNAL MEDICINE

## 2021-03-13 RX ADMIN — ASPIRIN 325 MG ORAL TABLET 325 MG: 325 PILL ORAL at 08:37

## 2021-03-13 RX ADMIN — AMLODIPINE BESYLATE 5 MG: 5 TABLET ORAL at 08:37

## 2021-03-13 RX ADMIN — ATORVASTATIN CALCIUM 40 MG: 40 TABLET, FILM COATED ORAL at 19:57

## 2021-03-13 ASSESSMENT — ACTIVITIES OF DAILY LIVING (ADL)
ADLS_ACUITY_SCORE: 13

## 2021-03-13 NOTE — PLAN OF CARE
Cognitive Concerns/ Orientation: A&O x3, disoriented to situation, forgetful. Calm, cooperative.   ABNL VS/O2: VSS on RA  MOBILITY: IND in room. SBA with walker in halls.  PAIN MANAGMENT: Denies pain   DIET: Low sat fat, 2g Na  BOWEL/BLADDER: Continent, up to bathroom.  ABNL LAB/BG: no new labs   DRAIN/DEVICES: No IV access, MD aware   SKIN: Dry, pale, and some scattered bruising on BLE.   TESTS/PROCEDURES: n/a  D/C DAY/GOALS/PLACE:  Pending safe placement, SW following for discharge.   OTHER IMPORTANT INFO: Voluntary status. PT/OT following. No complaints.

## 2021-03-13 NOTE — PLAN OF CARE
DATE & TIME: 3/12/21 9287-2381   Cognitive Concerns/ Orientation: A & O x3, disoriented to situation, forgetful. Frustrated with some cares and assessments.   ABNL VS/O2: Refused BP check.   MOBILITY: IND in room. SBA with walker in halls.  PAIN MANAGMENT: Denies pain   DIET: Heart Healthy   BOWEL/BLADDER: Continent, up to bathroom. Denies any issues when asked.   ABNL LAB/BG: N/A  DRAIN/DEVICES: Ok for no IV access   SKIN: Dry, pale, and some scattered bruising on BLE.   TESTS/PROCEDURES: N/A   D/C DAY/GOALS/PLACE:  Pending safe disposition. Last SW note 03/11/21.   OTHER IMPORTANT INFO: Stable over night, slept on and off. No complaints.

## 2021-03-13 NOTE — PROGRESS NOTES
[facility]    Medicine Progress Note - Hospitalist Service       Date of Admission:  2/17/2021    Assessment & Plan       Jose Vazquez is a 75-year-old male with a medical history of recent stroke who presented to the emergency department on 2/17/2021 after he was found in the community down after a fall.  Further evaluation has suggested some impairment and a notable SLUMS core of 6/30.  Psych eval during this stay felt he is not decisional for complex medical decisions.  He is currently on continuance for dismissal from legal standpoint and has appointed his brother and medical agent. Unfortunately cannot leave as no payer source (no financial POA appointed so facilities reluctant to take as likely will only be authorized for short stay). Unable to appoint a financial POA because he has not 's license or appropriate identification.   Patient is currently awaiting disposition.     On Court Hold       Fall, likely in the setting of physical deconditioning and recent CVA:  Patient not able to give any significant collateral history.  Based on his weakness and PT eval's, likely this was due to physical deconditioning. Overall improve at this time, continue PT and OT.   -TTE without any valvular abnormalities, telemetry NSR, laboratory work-up negative including TSH, CK, LFTs, troponin.  - tele NSR, discontinued earlier in stay.  - working with therapy  - Overall remain stable at this time.      Concern for cognitive impairment vs Acute encephalopathy (post stroke vs TBI vs other)  Concern for vulnerable adult  Patient previously living independently prior to his recent stroke.  After that he was discharged to a TCU which he left AMA from.  He does not recall the events leading to his AMA discharge.  Although he is oriented at times, significant concerns related to his decision-making ability. 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   This is a  difficult situation with patient deemed not decisional for complex decisions. Eventually were able to identify a brother, Jaziel Vazquez, who Jose appointed as medical decision maker on 3/9/21.   Does not have financial POA appointed at this point.  - OT for cognitive eval (SLUMS 6/30 on Feb 19 and 10/30 on March 1)  - Social work consult, they will file vulnerable adult  - brother Jaziel appointed as medical decision maker on 3/9/21  - plan to discharge to TCU  - Psych consult on 3/1 opted to hold patient and pursue commitment and subsequent guardianship  - on continuance for dismissal as of 3/11/21. Patient can discharge as soon as he has accepting facility    Hematuria: resolved.   Noted on urinalysis for many years  -Follow-up with outpatient urology discharge     Concern for UTI earlier in stay:  -Patient denies urinary symptoms  -Urine culture with no organisms  -Discontinued ceftriaxone earlier in stay.       HTN:  -was Holding PTA amlodipine as BP controlled without it currently.    Now blood pressure running slightly on higher side, 139-144 systolic and  diastolic.  He was 10 mg of amlodipine at home, restarted amlodipine at 5 mg daily on 3/6  Blood pressure better control now.      Subacute left basal ganglia stroke with possible petechial hemorrhage within the above infarct  Chronic-appearing infarct in the inferior medial right cerebellum  Multiple probable chronic microhemorrhages within both cerebral hemispheres and cerebellum  Right carotid stenosis  Presented on 1/14/2021 with speech/language impairment and gait disturbance.  On review of chart patient was then offered TPA, he had declined.   A1c 5.6%, LDL 93. TTE - EF normal, normal sized atria  -Restarted PTA aspirin 325 mg oral daily  -outpatient f/u with neurology for carotid stenosis  - Continue Lipitor 40 mg daily and amlodipine 5 mg daily at this time.       THADDEUS, pre-renal, resolved  During admission last month noted to have a creatinine of  1.41 that improved to 1.01.  Have again presents with creatinine of 1.22.  Avoid nephrotoxic medications.   -Last CR WNL.  Eating, taking in fluids.      Chronic obstructive pulmonary disease history  He smoked for over 40 years, a pack a day, but quit a few years ago.  Not requiring any inhalers.  Monitor        Diet: Combination Diet Low Saturated Fat Na <2400mg Diet  Advance Diet as Tolerated    DVT Prophylaxis: Ambulate every shift  Shen Catheter: not present  Code Status: Full Code           Disposition Plan   Expected discharge: patient is medically stable, but awaiting safe disposition at this time   Entered: Karina Thompson MD 03/13/2021, 1:38 PM       The patient's care was discussed with the Bedside Nurse, Care Coordinator/ and Patient.    Time Spent on this Encounter   I spent 25 minutes on the unit/floor managing the care of Jose Vazquez. Over 50% of my time was spent on the following:   - Counseling the patient and/or family regarding: recommended follow-up and medical compliance  - Coordination of care with the: care coordinator/ and nurse    MD Karina Crawford MD  Hospitalist Service  [facility]      ______________________________________________________________________    Interval History   Resting comfortably in bed.  Denies any complaints.  Peripheral worker will be involved awaiting placement.  No acute issues since yesterday    ROS otherwise negative.     Data reviewed today: I reviewed all medications, new labs and imaging results over the last 24 hours. I personally reviewed no images or EKG's today.    Physical Exam   Vital Signs: Temp: 97.6  F (36.4  C) Temp src: Oral BP: (!) 139/93 Pulse: 91   Resp: 16 SpO2: 95 % O2 Device: None (Room air)    Weight: 163 lbs 11.2 oz  GEN:  Alert, oriented , appears comfortable, no distress   HEENT:  Normocephalic/atraumatic, no scleral icterus, no nasal discharge, mouth moist.  EXT:  No  edema.  No cyanosis.  No acute joint synovitis noted.  SKIN:  Dry to touch, no exanthems noted in the visualized areas.  Chest: good air entry bilaterally, no wheezing or crackles.   CVS: S1 and S2 normal.   Abd: soft and non tender.     Data   No lab results found in last 7 days.    No results found for this or any previous visit (from the past 24 hour(s)).  Medications       amLODIPine  5 mg Oral Daily     aspirin  325 mg Oral or NG Tube Daily     atorvastatin  40 mg Oral QPM

## 2021-03-13 NOTE — PLAN OF CARE
"Cognitive Concerns/ Orientation: A&O x3, disoriented to situation, forgetful. Cooperative, but frustrated with some cares and assessments. Stated \"I don't have any deficits from a stroke\".    ABNL VS/O2: VSS on RA.   MOBILITY: independent in room. SBA with walker in halls. Walked with staff.  PAIN MANAGMENT: Denies pain   DIET: 2g Na/low sat fat, good appetite.   BOWEL/BLADDER: Continent, up to bathroom   ABNL LAB/BG: No new labs   DRAIN/DEVICES: Ok for no IV access   SKIN: Dry, pale, and some scattered bruising on BLE.   TESTS/PROCEDURES: NA   D/C DAY/GOALS/PLACE:  Pending placement, back to [facility]. TCU, SW following for placement. Brother involved with medical decision making.   OTHER IMPORTANT INFO: Voluntary status, compliant with plan. PT/OT following. Rounded on freq. Pt sleeping off and on this evening and asked not be woken.     "

## 2021-03-14 ENCOUNTER — APPOINTMENT (OUTPATIENT)
Dept: PHYSICAL THERAPY | Facility: CLINIC | Age: 76
DRG: 070 | End: 2021-03-14
Payer: COMMERCIAL

## 2021-03-14 PROCEDURE — 120N000001 HC R&B MED SURG/OB

## 2021-03-14 PROCEDURE — 250N000013 HC RX MED GY IP 250 OP 250 PS 637: Performed by: INTERNAL MEDICINE

## 2021-03-14 PROCEDURE — 99232 SBSQ HOSP IP/OBS MODERATE 35: CPT | Performed by: INTERNAL MEDICINE

## 2021-03-14 PROCEDURE — 97112 NEUROMUSCULAR REEDUCATION: CPT | Mod: GP | Performed by: PHYSICAL THERAPY ASSISTANT

## 2021-03-14 PROCEDURE — 250N000013 HC RX MED GY IP 250 OP 250 PS 637: Performed by: STUDENT IN AN ORGANIZED HEALTH CARE EDUCATION/TRAINING PROGRAM

## 2021-03-14 PROCEDURE — 250N000013 HC RX MED GY IP 250 OP 250 PS 637: Performed by: HOSPITALIST

## 2021-03-14 RX ADMIN — AMLODIPINE BESYLATE 5 MG: 5 TABLET ORAL at 10:06

## 2021-03-14 RX ADMIN — ASPIRIN 325 MG ORAL TABLET 325 MG: 325 PILL ORAL at 10:06

## 2021-03-14 RX ADMIN — ATORVASTATIN CALCIUM 40 MG: 40 TABLET, FILM COATED ORAL at 20:00

## 2021-03-14 ASSESSMENT — ACTIVITIES OF DAILY LIVING (ADL)
ADLS_ACUITY_SCORE: 13

## 2021-03-14 NOTE — PLAN OF CARE
Cognitive Concerns/ Orientation: A&Ox3. Flat. Calm and cooperative. Rounded on freq.   ABNL VS/O2: VSS on RA  MOBILITY: IND in room. SBA with walker in halls.  PAIN MANAGMENT: Denies pain   DIET: Low sat fat, 2g Na. Good appetite and oral intake.   BOWEL/BLADDER: Continent, up to bathroom independently.  ABNL LAB/BG: No new labs   DRAIN/DEVICES: No IV access, MD aware   SKIN: Dry, pale, and some scattered bruising on BLE.   TESTS/PROCEDURES: NA   D/C DAY/GOALS/PLACE:  Pending safe placement, SW following for discharge. Awaiting funding.   OTHER IMPORTANT INFO: Voluntary status. PT/OT following. No complaints. Rounded on freq.

## 2021-03-14 NOTE — PLAN OF CARE
Cognitive Concerns/ Orientation: A&O x3, disoriented to situation, forgetful. Calm and cooperative.    BEHAVIOR & AGGRESSION TOOL COLOR: Green   ABNL VS/O2: VSS on room air   MOBILITY: Independent in room, SBA in halls  PAIN MANAGMENT: denies pain   DIET: Low fat, 2 gram sodium, tolerating.   BOWEL/BLADDER: Continent, up to bathroom   ABNL LAB/BG: no new labs  DRAIN/DEVICES: No IV access  SKIN: scattered bruising, dry  TESTS/PROCEDURES: n/a  D/C DAY/GOALS/PLACE: Pending placement, SW following for disposition  OTHER IMPORTANT INFO: PT/OT following.

## 2021-03-14 NOTE — PROGRESS NOTES
DATE & TIME: 03/13/21 Night    Cognitive Concerns/ Orientation : Disoriented to situation; forgetful   BEHAVIOR & AGGRESSION TOOL COLOR: Green   ABNL VS/O2: VSS, room air  MOBILITY: Independent in room, SBA in halls  PAIN MANAGMENT: denies  DIET: Low fat, 2 gram sodium  BOWEL/BLADDER: Continent  ABNL LAB/BG: no new labs  DRAIN/DEVICES: No IV access  SKIN: scattered bruising, dry  TESTS/PROCEDURES: n/a  D/C DAY/GOALS/PLACE: Pending placement, SW following for disposition  OTHER IMPORTANT INFO: PT/OT following

## 2021-03-15 PROCEDURE — 250N000013 HC RX MED GY IP 250 OP 250 PS 637: Performed by: INTERNAL MEDICINE

## 2021-03-15 PROCEDURE — 250N000013 HC RX MED GY IP 250 OP 250 PS 637: Performed by: STUDENT IN AN ORGANIZED HEALTH CARE EDUCATION/TRAINING PROGRAM

## 2021-03-15 PROCEDURE — 250N000013 HC RX MED GY IP 250 OP 250 PS 637: Performed by: HOSPITALIST

## 2021-03-15 PROCEDURE — 99232 SBSQ HOSP IP/OBS MODERATE 35: CPT | Performed by: INTERNAL MEDICINE

## 2021-03-15 PROCEDURE — 120N000001 HC R&B MED SURG/OB

## 2021-03-15 RX ADMIN — AMLODIPINE BESYLATE 5 MG: 5 TABLET ORAL at 09:39

## 2021-03-15 RX ADMIN — ATORVASTATIN CALCIUM 40 MG: 40 TABLET, FILM COATED ORAL at 19:19

## 2021-03-15 RX ADMIN — ASPIRIN 325 MG ORAL TABLET 325 MG: 325 PILL ORAL at 09:39

## 2021-03-15 RX ADMIN — POLYETHYLENE GLYCOL 3350 17 G: 17 POWDER, FOR SOLUTION ORAL at 13:53

## 2021-03-15 ASSESSMENT — ACTIVITIES OF DAILY LIVING (ADL)
ADLS_ACUITY_SCORE: 14
ADLS_ACUITY_SCORE: 11
ADLS_ACUITY_SCORE: 13
ADLS_ACUITY_SCORE: 14

## 2021-03-15 NOTE — PLAN OF CARE
DATE & TIME: 3/15/2021 1434-7453    Cognitive Concerns/ Orientation : A&Ox 3 disoriented to situation   BEHAVIOR & AGGRESSION TOOL COLOR: green  CIWA SCORE: NA   ABNL VS/O2: NA  MOBILITY: Independent in room, SBA in [place]  PAIN MANAGMENT: denies pain  DIET: Low fat/cardiac diet  BOWEL/BLADDER: continent of B/B  ABNL LAB/BG: NA  DRAIN/DEVICES: NA, No IV access  TELEMETRY RHYTHM: NA  SKIN: scattered bruising, scabs on shins  TESTS/PROCEDURES: NA  D/C DAY/GOALS/PLACE: pending placement

## 2021-03-15 NOTE — PLAN OF CARE
OT- Attempted session. Pt requesting to have time in the bathroom for an extended time, and requesting therapist return later as able.

## 2021-03-15 NOTE — PROGRESS NOTES
[facility]    Medicine Progress Note - Hospitalist Service       Date of Admission:  2/17/2021    Assessment & Plan       Jose Vazquez is a 75-year-old male with a medical history of recent stroke who presented to the emergency department on 2/17/2021 after he was found in the community down after a fall.  Further evaluation has suggested some impairment and a notable SLUMS core of 6/30.  Psych eval during this stay felt he is not decisional for complex medical decisions.  He is currently on continuance for dismissal from legal standpoint and has appointed his brother and medical agent. Unfortunately cannot leave as no payer source (no financial POA appointed so facilities reluctant to take as likely will only be authorized for short stay). Unable to appoint a financial POA because he has not 's license or appropriate identification.   Patient is currently awaiting disposition.     On Court Hold       Fall, likely in the setting of physical deconditioning and recent CVA:  Patient not able to give any significant collateral history.  Based on his weakness and PT eval's, likely this was due to physical deconditioning. Overall improve at this time, continue PT and OT.   -TTE without any valvular abnormalities, telemetry NSR, laboratory work-up negative including TSH, CK, LFTs, troponin.  - tele NSR, discontinued earlier in stay.  - working with therapy  - Overall remain stable at this time.      Concern for cognitive impairment vs Acute encephalopathy (post stroke vs TBI vs other)  Concern for vulnerable adult  Patient previously living independently prior to his recent stroke.  After that he was discharged to a TCU which he left AMA from.  He does not recall the events leading to his AMA discharge.  Although he is oriented at times, significant concerns related to his decision-making ability. 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   This is a  difficult situation with patient deemed not decisional for complex decisions. Eventually were able to identify a brother, Jaziel Vazquez, who Jose appointed as medical decision maker on 3/9/21.   Does not have financial POA appointed at this point.  - OT for cognitive eval (SLUMS 6/30 on Feb 19 and 10/30 on March 1)  - Social work consult, they will file vulnerable adult  - brother Jaziel appointed as medical decision maker on 3/9/21  - plan to discharge to TCU  - Psych consult on 3/1 opted to hold patient and pursue commitment and subsequent guardianship  - on continuance for dismissal as of 3/11/21. Patient can discharge as soon as he has accepting facility    Hematuria: resolved.   Noted on urinalysis for many years  -Follow-up with outpatient urology discharge     Concern for UTI earlier in stay:  -Patient denies urinary symptoms  -Urine culture with no organisms  -Discontinued ceftriaxone earlier in stay.       HTN:  -was Holding PTA amlodipine as BP controlled without it currently.    Now blood pressure running slightly on higher side, 139-144 systolic and  diastolic.  He was 10 mg of amlodipine at home, restarted amlodipine at 5 mg daily on 3/6  Blood pressure better control now.      Subacute left basal ganglia stroke with possible petechial hemorrhage within the above infarct  Chronic-appearing infarct in the inferior medial right cerebellum  Multiple probable chronic microhemorrhages within both cerebral hemispheres and cerebellum  Right carotid stenosis  Presented on 1/14/2021 with speech/language impairment and gait disturbance.  On review of chart patient was then offered TPA, he had declined.   A1c 5.6%, LDL 93. TTE - EF normal, normal sized atria  -Restarted PTA aspirin 325 mg oral daily  -outpatient f/u with neurology for carotid stenosis  - Continue Lipitor 40 mg daily and amlodipine 5 mg daily at this time.       THADDEUS, pre-renal, resolved  During admission last month noted to have a creatinine of  1.41 that improved to 1.01.  Have again presents with creatinine of 1.22.  Avoid nephrotoxic medications.   -Last CR WNL.  Eating, taking in fluids.      Chronic obstructive pulmonary disease history  He smoked for over 40 years, a pack a day, but quit a few years ago.  Not requiring any inhalers.  Monitor        Diet: Combination Diet Low Saturated Fat Na <2400mg Diet  Advance Diet as Tolerated    DVT Prophylaxis: Ambulate every shift  Shen Catheter: not present  Code Status: Full Code           Disposition Plan   Expected discharge: patient is medically stable, but awaiting safe disposition at this time   Entered: Karina Thompson MD 03/15/2021, 2:55 PM       The patient's care was discussed with the Bedside Nurse,patient today      Time Spent on this Encounter   I spent 25 minutes on the unit/floor managing the care of Jose Vazquez. Over 50% of my time was spent on the following:   - Counseling the patient and/or family regarding: recommended follow-up and medical compliance  - Coordination of care with the: care coordinator/ and nurse    MD Karina Crawford MD  Hospitalist Service  [facility]      ______________________________________________________________________    Interval History   Resting comfortably in bed.  Denies any complaints.    No acute issues since yesterday    ROS otherwise negative.     Data reviewed today: I reviewed all medications, new labs and imaging results over the last 24 hours. I personally reviewed no images or EKG's today.    Physical Exam   Vital Signs: Temp: 97.5  F (36.4  C) Temp src: Oral BP: 123/86 Pulse: 83   Resp: 18 SpO2: 96 % O2 Device: None (Room air)    Weight: 163 lbs 11.2 oz  GEN:  Alert, oriented , appears comfortable, no distress   HEENT:  Normocephalic/atraumatic, no scleral icterus, no nasal discharge, mouth moist.  EXT:  No edema.  No cyanosis.  No acute joint synovitis noted.  SKIN:  Dry to touch, no exanthems  noted in the visualized areas.  Chest: good air entry bilaterally, no wheezing or crackles.   CVS: S1 and S2 normal.   Abd: soft and non tender.     Data   No lab results found in last 7 days.    No results found for this or any previous visit (from the past 24 hour(s)).  Medications       amLODIPine  5 mg Oral Daily     aspirin  325 mg Oral or NG Tube Daily     atorvastatin  40 mg Oral QPM

## 2021-03-15 NOTE — PLAN OF CARE
Cognitive Concerns/ Orientation: A&O x3, disoriented to situation, forgetful. Calm and cooperative. Sleeping off and on through shift. Rounded on freq.   BEHAVIOR & AGGRESSION TOOL COLOR: Green   ABNL VS/O2: VSS on room air   MOBILITY: Independent in room, SBA with walker in the halls.  PAIN MANAGMENT: Denies   DIET: Low fat, 2 gram sodium. Good appetite and fluid intake.   BOWEL/BLADDER: Continent, up to bathroom independently. WDL   ABNL LAB/BG: No new labs  DRAIN/DEVICES: No IV access  SKIN: Scattered bruising, scabs on shins, dry and flaky. Offered shower, pt refused.   TESTS/PROCEDURES: NA  D/C DAY/GOALS/PLACE: Pending placement and funding, SW following.   OTHER IMPORTANT INFO: PT/OT following. Calls as needed. Compliant with plan. Rounded on freq. Refuses bed alarms.

## 2021-03-15 NOTE — PLAN OF CARE
DATE & TIME: 3/15/2021 6239-5421                    Cognitive Concerns/ Orientation : A&Ox 2-3; he appeared to refer to the white board where his birthday is written;  disoriented to situation   BEHAVIOR & AGGRESSION TOOL COLOR: green  CIWA SCORE: NA      ABNL VS/O2: NA  MOBILITY: Independent in room, SBA in [place] with walker/GB  PAIN MANAGMENT: denies pain  DIET: Low fat/cardiac diet; good intake  BOWEL/BLADDER: continent of B/B; c/o constipation, miralax given this afternoon.  ABNL LAB/BG: NA  DRAIN/DEVICES: NA, No IV access  TELEMETRY RHYTHM: NA  SKIN: scattered bruising, scabs on shins  TESTS/PROCEDURES: NA  D/C DAY/GOALS/PLACE: pending placement,  following with brother's involvement

## 2021-03-16 ENCOUNTER — APPOINTMENT (OUTPATIENT)
Dept: OCCUPATIONAL THERAPY | Facility: CLINIC | Age: 76
DRG: 070 | End: 2021-03-16
Payer: COMMERCIAL

## 2021-03-16 PROCEDURE — 250N000013 HC RX MED GY IP 250 OP 250 PS 637: Performed by: HOSPITALIST

## 2021-03-16 PROCEDURE — 97535 SELF CARE MNGMENT TRAINING: CPT | Mod: GO

## 2021-03-16 PROCEDURE — 99231 SBSQ HOSP IP/OBS SF/LOW 25: CPT | Performed by: PSYCHIATRY & NEUROLOGY

## 2021-03-16 PROCEDURE — 250N000013 HC RX MED GY IP 250 OP 250 PS 637: Performed by: INTERNAL MEDICINE

## 2021-03-16 PROCEDURE — 99232 SBSQ HOSP IP/OBS MODERATE 35: CPT | Performed by: INTERNAL MEDICINE

## 2021-03-16 PROCEDURE — 99207 PR CDG-MDM COMPONENT: MEETS LOW - DOWN CODED: CPT | Performed by: PSYCHIATRY & NEUROLOGY

## 2021-03-16 PROCEDURE — 120N000001 HC R&B MED SURG/OB

## 2021-03-16 RX ORDER — AMLODIPINE BESYLATE 5 MG/1
5 TABLET ORAL DAILY
Qty: 30 TABLET | Refills: 0 | DISCHARGE
Start: 2021-03-17 | End: 2021-03-18

## 2021-03-16 RX ORDER — ATORVASTATIN CALCIUM 40 MG/1
40 TABLET, FILM COATED ORAL EVERY EVENING
Qty: 30 TABLET | Refills: 1 | DISCHARGE
Start: 2021-03-16 | End: 2021-03-18

## 2021-03-16 RX ORDER — ALBUTEROL SULFATE 90 UG/1
2 AEROSOL, METERED RESPIRATORY (INHALATION) EVERY 4 HOURS PRN
Qty: 1 INHALER | Refills: 0 | DISCHARGE
Start: 2021-03-16

## 2021-03-16 RX ORDER — ASPIRIN 325 MG
325 TABLET ORAL DAILY
Qty: 100 TABLET | Refills: 1 | DISCHARGE
Start: 2021-03-16 | End: 2021-03-18

## 2021-03-16 RX ADMIN — AMLODIPINE BESYLATE 5 MG: 5 TABLET ORAL at 10:13

## 2021-03-16 RX ADMIN — ASPIRIN 325 MG ORAL TABLET 325 MG: 325 PILL ORAL at 10:13

## 2021-03-16 ASSESSMENT — ACTIVITIES OF DAILY LIVING (ADL)
ADLS_ACUITY_SCORE: 12
ADLS_ACUITY_SCORE: 11
ADLS_ACUITY_SCORE: 12
ADLS_ACUITY_SCORE: 11
ADLS_ACUITY_SCORE: 12
ADLS_ACUITY_SCORE: 12

## 2021-03-16 NOTE — PLAN OF CARE
DATE & TIME: 3/15/2021 03-11pm shift                  Cognitive Concerns/ Orientation : A&Ox 2-3;  disoriented to situation hx of slums 6/30  BEHAVIOR & AGGRESSION TOOL COLOR: green  CIWA SCORE: NA      ABNL VS/O2: NA  MOBILITY: Independent in room, SBA in [place], ambulated x1 with walker/GB  PAIN MANAGMENT: denies pain  DIET: Low fat/cardiac diet; good intake  BOWEL/BLADDER: continent of B/B; c/o constipation, miralax given by AM nurse.  ABNL LAB/BG: NA  DRAIN/DEVICES: NA, No IV access  TELEMETRY RHYTHM: NA  SKIN: scattered bruising, scabs on shins  TESTS/PROCEDURES: NA  D/C DAY/GOALS/PLACE: pending placement,  following with brother's involvement. Angie Parker completed assessment today.

## 2021-03-16 NOTE — PROGRESS NOTES
[facility]    Medicine Progress Note - Hospitalist Service       Date of Admission:  2/17/2021    Assessment & Plan       Jose Vazquez is a 75-year-old male with a medical history of recent stroke who presented to the emergency department on 2/17/2021 after he was found in the community down after a fall.  Further evaluation has suggested some impairment and a notable SLUMS core of 6/30.  Psych eval during this stay felt he is not decisional for complex medical decisions.  He is currently on continuance for dismissal from legal standpoint and has appointed his brother and medical agent. Unfortunately cannot leave as no payer source (no financial POA appointed so facilities reluctant to take as likely will only be authorized for short stay). Unable to appoint a financial POA because he has not 's license or appropriate identification.   Patient is currently awaiting disposition.     On Court Hold       Fall, likely in the setting of physical deconditioning and recent CVA:  Patient not able to give any significant collateral history.  Based on his weakness and PT eval's, likely this was due to physical deconditioning. Overall improve at this time, continue PT and OT.   -TTE without any valvular abnormalities, telemetry NSR, laboratory work-up negative including TSH, CK, LFTs, troponin.  - tele NSR, discontinued earlier in stay.  - working with therapy  - Overall remain stable at this time.     We will request psych consult today to see if he can discharge home with his brothers help at home with home health care services on discharge     Concern for cognitive impairment vs Acute encephalopathy (post stroke vs TBI vs other)  Concern for vulnerable adult  Patient previously living independently prior to his recent stroke.  After that he was discharged to a TCU which he left AMA from.  He does not recall the events leading to his AMA discharge.  Although he is oriented at times, significant  concerns related to his decision-making ability. 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   This is a difficult situation with patient deemed not decisional for complex decisions. Eventually were able to identify a brother, Jaziel Vazquez, who Jose appointed as medical decision maker on 3/9/21.   Does not have financial POA appointed at this point.  - OT for cognitive eval (SLUMS 6/30 on Feb 19 and 10/30 on March 1)  - Social work consult, they will file vulnerable adult  - brother Jaziel appointed as medical decision maker on 3/9/21  - plan to discharge to TCU  - Psych consult on 3/1 opted to hold patient and pursue commitment and subsequent guardianship  - on continuance for dismissal as of 3/11/21. Patient can discharge as soon as he has accepting facility    Hematuria: resolved.   Noted on urinalysis for many years  -Follow-up with outpatient urology discharge     Concern for UTI earlier in stay:  -Patient denies urinary symptoms  -Urine culture with no organisms  -Discontinued ceftriaxone earlier in stay.       HTN:  -was Holding PTA amlodipine as BP controlled without it currently.    Now blood pressure running slightly on higher side, 139-144 systolic and  diastolic.  He was 10 mg of amlodipine at home, restarted amlodipine at 5 mg daily on 3/6  Blood pressure better control now.      Subacute left basal ganglia stroke with possible petechial hemorrhage within the above infarct  Chronic-appearing infarct in the inferior medial right cerebellum  Multiple probable chronic microhemorrhages within both cerebral hemispheres and cerebellum  Right carotid stenosis  Presented on 1/14/2021 with speech/language impairment and gait disturbance.  On review of chart patient was then offered TPA, he had declined.   A1c 5.6%, LDL 93. TTE - EF normal, normal sized atria  -Restarted PTA aspirin 325 mg oral daily  -outpatient f/u with neurology for carotid stenosis  - Continue Lipitor 40 mg  daily and amlodipine 5 mg daily at this time.       THADDEUS, pre-renal, resolved  During admission last month noted to have a creatinine of 1.41 that improved to 1.01.  Have again presents with creatinine of 1.22.  Avoid nephrotoxic medications.   -Last CR WNL.  Eating, taking in fluids.      Chronic obstructive pulmonary disease history  He smoked for over 40 years, a pack a day, but quit a few years ago.  Not requiring any inhalers.  Monitor        Diet: Combination Diet Low Saturated Fat Na <2400mg Diet  Advance Diet as Tolerated    DVT Prophylaxis: Ambulate every shift  Shen Catheter: not present  Code Status: Full Code           Disposition Plan   Expected discharge: patient is medically stable, but awaiting safe disposition at this time   Entered: Karina Thompson MD 03/16/2021, 1:48 PM       The patient's care was discussed with the Bedside Nurse,patient today      Time Spent on this Encounter   I spent 25 minutes on the unit/floor managing the care of Jose Vazquez. Over 50% of my time was spent on the following:   - Counseling the patient and/or family regarding: recommended follow-up and medical compliance  - Coordination of care with the: care coordinator/ and nurse    MD Karina Crawford MD  Hospitalist Service  [facility]      ______________________________________________________________________    Interval History   Resting comfortably in bed.  Denies any complaints.    No acute issues since yesterday    ROS otherwise negative.     Data reviewed today: I reviewed all medications, new labs and imaging results over the last 24 hours. I personally reviewed no images or EKG's today.    Physical Exam   Vital Signs: Temp: 97.4  F (36.3  C) Temp src: Oral BP: 114/82 Pulse: 85   Resp: 18 SpO2: 97 % O2 Device: None (Room air)    Weight: 163 lbs 11.2 oz  GEN:  Alert, oriented , appears comfortable, no distress   HEENT:  Normocephalic/atraumatic, no scleral  icterus, no nasal discharge, mouth moist.  EXT:  No edema.  No cyanosis.  No acute joint synovitis noted.  SKIN:  Dry to touch, no exanthems noted in the visualized areas.  Chest: good air entry bilaterally, no wheezing or crackles.   CVS: S1 and S2 normal.   Abd: soft and non tender.     Data   No lab results found in last 7 days.    No results found for this or any previous visit (from the past 24 hour(s)).  Medications       amLODIPine  5 mg Oral Daily     aspirin  325 mg Oral or NG Tube Daily     atorvastatin  40 mg Oral QPM

## 2021-03-16 NOTE — PROGRESS NOTES
Care Management Follow Up    Length of Stay (days): 26    Expected Discharge Date: 03/17/21     Concerns to be Addressed: discharge planning       Anticipated Discharge Disposition: Transitional Care     Anticipated Discharge Services:    Anticipated Discharge DME:      Patient/family educated on Medicare website which has current facility and service quality ratings:    Education Provided on the Discharge Plan:    Patient/Family in Agreement with the Plan: yes    Additional Information:  Received a call from [employer] ARASH Chacon at [phone]. Lance is faxing [employer] for patient to sign and an assessment form he would like completed.  Steven  from [facility] ([phone]) called and can offer a memory Care room in their Board and Care which would need furnishing, either with patient's belongings or items from their storage. The room would have only a hospital bed. Also, $5000 is requested upon admission, if patient is private pay.  Spoke with RN SARAH Gaimno, who plans to speak with patient's brother, Jaziel about this option, noting he is considering having patient discharge to the community with his support.  SW/CC continue to follow      OFELIA Smith     Addendum  ARASH and SARAH Gamino confirmed plan for discharge home with brother Jaziel, who plans to provide transport home on 3/17 Jaziel agrees to home care. Follow up appointments will be made. Jaziel states he and his spouse will work out a plan for patient to take his medications. He reports he plans to check in regularly with patient and he can enlist the help of a neighbor also.  Informed Steven at [facility], the bed will not be needed.  Updated ARASH Lucas regarding this plan. Noted Jaziel is against having patient sign consents.  Discharge orders are requested to be faxed to [phone]

## 2021-03-16 NOTE — PLAN OF CARE
DATE & TIME: 3/16/21 2534-5407   Cognitive Concerns/ Orientation : A&Ox 4  BEHAVIOR & AGGRESSION TOOL COLOR: green  CIWA SCORE: NA      ABNL VS/O2: NA  MOBILITY: Independent in room, SBA in [place].  PAIN MANAGMENT: denies pain  DIET: Low fat/cardiac diet; good intake  BOWEL/BLADDER: continent of B/B last BM yesterday  ABNL LAB/BG: NA  DRAIN/DEVICES: NA, No IV access  TELEMETRY RHYTHM: NA  SKIN: scattered bruising, scabs on shins  TESTS/PROCEDURES: Psyche consult to determine ability to return home with brothers help.  D/C DAY/GOALS/PLACE: Planning discharge tomorrow with assistance at home of brother at 10:30 am.

## 2021-03-16 NOTE — PLAN OF CARE
"PT - Pt refused appointment today. Pt states he is discharging tomorrow and \"did not want to do PT at all.\"     "

## 2021-03-16 NOTE — PROGRESS NOTES
BRIEF NUTRITION REASSESSMENT      CURRENT DIET AND INTAKE:  Diet:  LSF, <2400 mg Na.  Per flow sheets, has been eating 100%.                ANTHROPOMETRICS:  Vitals:    02/17/21 2337 02/26/21 1018   Weight: 77.7 kg (171 lb 4.8 oz) 74.3 kg (163 lb 11.2 oz)         LABS:  Labs noted      NUTRITION INTERVENTION:  Nutrition Diagnosis:  No nutrition diagnosis at this time.    Implementation:  Nutrition Education:  Per Provider order if indicated    FOLLOW UP/MONITORING:   Will re-evaluate in 7 - 10 days, or sooner, if re-consulted.

## 2021-03-16 NOTE — CONSULTS
"[facility]  Psychiatry Consultation - Follow-up note      Interim History:   Follow-up requested today to comment regarding disposition planning.  The patient's brother who has been appointed his healthcare decision maker and is requesting for the patient to be discharged home with family being available to provide additional support.    The patient's brother was available today during the time that I met with the patient.  He confirms that he would like the patient to be discharged home.  He is aware that there have been recommendations for TCU or other supportive residential facilities and is able to explain to me the benefits of those facilities however feels that friends and family members will be able to provide the patient with adequate support at home.  He reviewed with me ways to assist in medication management, personal hygiene, safety around meal preparation with emphasis on using the microwave as opposed to stovetop or oven.    On examination with the patient, he confirms his desire to return home as well.  His mood is stable without reports of depressed or anxious mood.  He is sleeping well and eating adequately.  He had no particular mental health related complaints or concerns today.  He denies psychotic symptoms.  He denied suicidal and homicidal thoughts.           Medications:       amLODIPine  5 mg Oral Daily     aspirin  325 mg Oral or NG Tube Daily     atorvastatin  40 mg Oral QPM          Allergies:   No Known Allergies       Labs:   No results found for this or any previous visit (from the past 24 hour(s)).       Psychiatric Examination:     /72 (BP Location: Right arm)   Pulse 86   Temp 98  F (36.7  C) (Oral)   Resp 16   Ht 1.854 m (6' 1\")   Wt 74.3 kg (163 lb 11.2 oz)   SpO2 100%   BMI 21.60 kg/m    Weight is 163 lbs 11.2 oz  Body mass index is 21.6 kg/m .  Orthostatic Vitals     None            Appearance: awake, alert  Attitude:  cooperative  Eye " Contact:  fair  Mood:  better  Affect:  appropriate and in normal range  Speech:  clear, coherent  Psychomotor Behavior:  no evidence of tardive dyskinesia, dystonia, or tics  Throught Process:  linear  Associations:  no loose associations  Thought Content:  no evidence of suicidal ideation or homicidal ideation and no evidence of psychotic thought  Insight:  partial  Judgement:  limited  Oriented to:  Person and place  Attention Span and Concentration:  limited  Recent and Remote Memory:  limited           DIagnoses:     Possible neurocognitive disorder  Delirium due to another medical condition (recent stroke, recent fall), improving      Recommendations:     From a psychiatric standpoint, I do not have concerns with the patient being discharged home under the care of his brother.  Once the patient is determined to be medically stable, he may be discharged from the hospital.    Legal status: Continuance for dismissal therefore I will discontinue the 72-hour hold and treatment may proceed voluntarily as part of a continuance for dismissal agreement.    Please reconsult with Psychiatry as needed.   Heriberto Lee MD   Text Page

## 2021-03-16 NOTE — PLAN OF CARE
DATE & TIME: 3/15/21 3358-9613                  Cognitive Concerns/ Orientation : A&Ox 4  BEHAVIOR & AGGRESSION TOOL COLOR: green  CIWA SCORE: NA      ABNL VS/O2: NA  MOBILITY: Independent in room, SBA in [place].  PAIN MANAGMENT: denies pain  DIET: Low fat/cardiac diet; good intake  BOWEL/BLADDER: continent of B/B.  ABNL LAB/BG: NA  DRAIN/DEVICES: NA, No IV access  TELEMETRY RHYTHM: NA  SKIN: scattered bruising, scabs on shins  TESTS/PROCEDURES: NA  D/C DAY/GOALS/PLACE: pending placement,  following with brother's involvement. Angie Parker completed assessment today.

## 2021-03-17 ENCOUNTER — APPOINTMENT (OUTPATIENT)
Dept: OCCUPATIONAL THERAPY | Facility: CLINIC | Age: 76
DRG: 070 | End: 2021-03-17
Payer: COMMERCIAL

## 2021-03-17 VITALS
TEMPERATURE: 97.7 F | DIASTOLIC BLOOD PRESSURE: 80 MMHG | OXYGEN SATURATION: 95 % | WEIGHT: 163.7 LBS | HEART RATE: 91 BPM | SYSTOLIC BLOOD PRESSURE: 113 MMHG | BODY MASS INDEX: 21.69 KG/M2 | RESPIRATION RATE: 18 BRPM | HEIGHT: 73 IN

## 2021-03-17 PROCEDURE — 99239 HOSP IP/OBS DSCHRG MGMT >30: CPT | Performed by: INTERNAL MEDICINE

## 2021-03-17 PROCEDURE — 97530 THERAPEUTIC ACTIVITIES: CPT | Mod: GO

## 2021-03-17 PROCEDURE — 250N000013 HC RX MED GY IP 250 OP 250 PS 637: Performed by: HOSPITALIST

## 2021-03-17 PROCEDURE — 97535 SELF CARE MNGMENT TRAINING: CPT | Mod: GO

## 2021-03-17 PROCEDURE — 250N000013 HC RX MED GY IP 250 OP 250 PS 637: Performed by: INTERNAL MEDICINE

## 2021-03-17 RX ADMIN — AMLODIPINE BESYLATE 5 MG: 5 TABLET ORAL at 08:57

## 2021-03-17 RX ADMIN — ASPIRIN 325 MG ORAL TABLET 325 MG: 325 PILL ORAL at 08:57

## 2021-03-17 ASSESSMENT — ACTIVITIES OF DAILY LIVING (ADL)
ADLS_ACUITY_SCORE: 12

## 2021-03-17 NOTE — PLAN OF CARE
Occupational Therapy Discharge Summary    Reason for therapy discharge:    Discharged to home.    Progress towards therapy goal(s). See goals on Care Plan in [facility] electronic health record for goal details.  Goals not met.  Barriers to achieving goals:   discharge from facility.    Therapy recommendation(s):    Skilled OT in TCU setting recommeded to address safety and independence in I/ADLs and cognition.

## 2021-03-17 NOTE — PROGRESS NOTES
"[facility]  Patient was discharged home with his brother earlier today.  Called patient's brother Jaziel to discuss home care services.   At this time, patient's brother Jaziel is refusing home care services. Reviewed the benefits of skilled home care services and recommendations of the hospital team.  Patient's brother continued to refuse home care services and states \"We don't want this. We don't need it. My wife and I are here, we will have neighbors helping. We will let you know if something changes.\" Care team notified of patient refusal.  Pt has 24 hour phone number for [facility] for any questions or concerns.       "

## 2021-03-17 NOTE — PROGRESS NOTES
Care Management Discharge Note    Discharge Date: 03/17/21(9980)       Discharge Disposition: Home  Discharge Services:  Homecare Services  [facility]  RN OT PT     Discharge DME:  None    Discharge Transportation: [facility]    Private pay costs discussed: Not applicable    Patient/family educated on Medicare website which has current facility and service quality ratings:  [facility]    Education Provided on the Discharge Plan:  Yes  Persons Notified of Discharge Plans: Brother  Patient/Family in Agreement with the Plan: yes    Handoff Referral Completed: Yes    Additional Information:  Met with patients brother (Jaziel) and discussed that [facility] has accepted patient. Brother stating he would not like to do it as he does not want patient to be away from his home the 2nd time. Copan stating since he has the financial and health care POA he will make sure patient is safe and not in harmful situation.  Jaziel will set up his medications and remind patient to take it. He will manage groceries  and food so that patient does not have to use the stove. Jaziel states patient has neighbors who will keep a close watch on him. Discussed therapy recommendation for 24 hr supervision. Copan stating he will be able to manage it. Jaziel will inform home care if its not possible to care for patient but wanted to give patient a chance to live in his own home. Brother is aware that frederic should not be driving and has taken his truck keys away.  Appointment with primary care completed. Patient will need a 2nd Covid vaccine and patients brother will discuss with MD at the appointment.       Stevenson Solitario RN  Inpatient Care Coordinator  [facility] Cyn/Ana Lilia  #[phone]

## 2021-03-17 NOTE — DISCHARGE SUMMARY
[facility]  Discharge Summary        Jose Vazquez MRN# [id number]   YOB: 1945 Age: 75 year old     Date of Admission:  2/17/2021  Date of Discharge:  3/17/2021  Admitting Physician:  Tyler Henderson MD  Discharge Physician: Karina Thompson MD  Discharging Service: Hospitalist     Primary Provider: Jcarlos Barbosa  Primary Care Physician Phone Number: [phone]         Discharge Diagnoses/Problem Oriented Hospital Course (Providers):    Jose Vazquez was admitted on 2/17/2021 by Tyler Henderson MD and I would refer you to their history and physical.  The following problems were addressed during his hospitalization:  Assessment & Plan         Jose Vazquez is a 75-year-old male with a medical history of recent stroke who presented to the emergency department on 2/17/2021 after he was found in the community down after a fall.  Further evaluation has suggested some impairment and a notable SLUMS core of 6/30.  Psych eval during this stay felt he is not decisional for complex medical decisions.  He is currently on continuance for dismissal from legal standpoint and has appointed his brother and medical agent. Unfortunately cannot leave as no payer source (no financial POA appointed so facilities reluctant to take as likely will only be authorized for short stay). Unable to appoint a financial POA because he has not 's license or appropriate identification.     Fall, likely in the setting of physical deconditioning and recent CVA:  Patient not able to give any significant collateral history.  Based on his weakness and PT eval's, likely this was due to physical deconditioning. Overall improve at this time, continue PT and OT.   -TTE without any valvular abnormalities, telemetry NSR, laboratory work-up negative including TSH, CK, LFTs, troponin.  - tele NSR, discontinued earlier in stay.  - working with therapy  - Overall remain stable at this time.      Concern for  cognitive impairment vs Acute encephalopathy (post stroke vs TBI vs other)  Concern for vulnerable adult  Patient previously living independently prior to his recent stroke.  After that he was discharged to a TCU which he left AMA from.  He does not recall the events leading to his AMA discharge.  Although he is oriented at times, significant concerns related to his decision-making ability. 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   This is a difficult situation with patient deemed not decisional for complex decisions. Eventually were able to identify a brother, Jaziel Vazquez, who Jose appointed as medical decision maker on 3/9/21.   Does not have financial POA appointed at this point.  - OT for cognitive eval (SLUMS 6/30 on Feb 19 and 10/30 on March 1)  - Social work consult, they will file vulnerable adult  - brother Jaziel appointed as medical decision maker on 3/9/21  -Patient and family refused to go to TCU  -Psychiatry reconsulted and met with the patient and evaluated him and I discussed with the brother who was at bedside during psychiatric visit.  And they were convinced that patient in can be managed at home with his brothers and friends help so the recommended discharge to home with brothers supervision and home health care services    Hematuria: resolved.   Noted on urinalysis for many years  -Follow-up with outpatient urology discharge     Concern for UTI earlier in stay:  -Patient denies urinary symptoms  -Urine culture with no organisms  -Discontinued ceftriaxone earlier in stay.       HTN:  -was Holding PTA amlodipine as BP controlled without it currently.    Now blood pressure running slightly on higher side, 139-144 systolic and  diastolic.  He was 10 mg of amlodipine at home, restarted amlodipine at 5 mg daily on 3/6  Blood pressure better control now.      Subacute left basal ganglia stroke with possible petechial hemorrhage within  the above infarct  Chronic-appearing infarct in the inferior medial right cerebellum  Multiple probable chronic microhemorrhages within both cerebral hemispheres and cerebellum  Right carotid stenosis  Presented on 1/14/2021 with speech/language impairment and gait disturbance.  On review of chart patient was then offered TPA, he had declined.   A1c 5.6%, LDL 93. TTE - EF normal, normal sized atria  -Restarted PTA aspirin 325 mg oral daily  -outpatient f/u with neurology for carotid stenosis  - Continue Lipitor 40 mg daily and amlodipine 5 mg daily at this time.       THADDEUS, pre-renal, resolved  During admission last month noted to have a creatinine of 1.41 that improved to 1.01.  Have again presents with creatinine of 1.22.  Avoid nephrotoxic medications.   -Last CR WNL.  Eating, taking in fluids.      Chronic obstructive pulmonary disease history  He smoked for over 40 years, a pack a day, but quit a few years ago.  Not requiring any inhalers.  Monitor            Diet: Combination Diet Low Saturated Fat Na <2400mg Diet  Advance Diet as Tolerated    DVT Prophylaxis: Ambulate every shift  Shen Catheter: not present  Code Status: Full Code               Disposition Plan     Expected discharge: Discharge to home today with home health care services.  With close supervision by brother    .Karina Thompson MD   Page [phone](7AM-6PM)          Code Status:      Full Code        Brief Hospital Stay Summary Sent Home With Patient in AVS:        Reason for your hospital stay      fall                 Important Results:      See below        Pending Results:        Unresulted Labs Ordered in the Past 30 Days of this Admission     No orders found from 1/18/2021 to 2/18/2021.            Discharge Instructions and Follow-Up:      Follow-up Appointments     Follow-up and recommended labs and tests       Follow up with primary care provider, Jcarlos Barbosa, within 7 days   for hospital follow- up.  The following labs/tests are  "recommended: none .                 Discharge Disposition:      Discharged to home        Discharge Medications:        Discharge Medication List as of 3/17/2021 10:35 AM      CONTINUE these medications which have CHANGED    Details   albuterol (PROAIR HFA/PROVENTIL HFA/VENTOLIN HFA) 108 (90 Base) MCG/ACT inhaler Inhale 2 puffs into the lungs every 4 hours as needed for wheezing or shortness of breath / dyspnea, Disp-1 Inhaler, R-0, TransitionalPharmacy may dispense brand covered by insurance (Proair, or proventil or ventolin or generic albuterol inhaler)      amLODIPine (NORVASC) 5 MG tablet Take 1 tablet (5 mg) by mouth daily, Disp-30 tablet, R-0, Transitional      aspirin (ASA) 325 MG tablet Take 1 tablet (325 mg) by mouth daily, Disp-100 tablet, R-1, Transitional      atorvastatin (LIPITOR) 40 MG tablet Take 1 tablet (40 mg) by mouth every evening, Disp-30 tablet, R-1, Transitional               Allergies:       No Known Allergies        Consultations This Hospital Stay:      Consultation during this admission received from psychiatry        Condition and Physical on Discharge:      Discharge condition: Stable   Vitals: Blood pressure 113/80, pulse 91, temperature 97.7  F (36.5  C), temperature source Oral, resp. rate 18, height 1.854 m (6' 1\"), weight 74.3 kg (163 lb 11.2 oz), SpO2 95 %.     Constitutional:  Alert awake, not in acute distress   Lungs:  Clear to auscultation, no rales rhonchi or wheezing   Cardiovascular:  Normal rate rhythm, no murmurs heard on auscultation   Abdomen:  Soft, nontender, nondistended, bowel sounds positive no hepatosplenomegaly   Skin:  Warm and dry   Other:          Discharge Time:      Greater than 30 minutes.        Image Results From This Hospital Stay (For Non-EPIC Providers):        Results for orders placed or performed during the hospital encounter of 02/17/21   CT Head w/o Contrast    Narrative    CT SCAN OF THE HEAD WITHOUT CONTRAST   2/17/2021 5:15 PM     HISTORY: " Stroke, follow up    TECHNIQUE:  Axial images of the head and coronal reformations without  IV contrast material. Radiation dose for this scan was reduced using  automated exposure control, adjustment of the mA and/or kV according  to patient size, or iterative reconstruction technique.    COMPARISON: Head MRI 1/14/2021, head CT 1/14/2021    FINDINGS:  Moderate volume loss is present. Patchy and confluent white matter  hypoattenuation likely represents advanced chronic small vessel  ischemic change. Subacute infarct involving the left basal ganglia as  expected compared to the prior exam. Old appearing infarcts are  present involving the isidro and small old infarcts are present  involving the bilateral cerebellar hemispheres. No evidence of acute  hemorrhage, mass effect or hydrocephalus.     The visualized calvarium, tympanic cavities, mastoid cavities, and  paranasal sinuses are unremarkable.      Impression    IMPRESSION:     1. Expected evolution of subacute infarct involving the left basal  ganglia.  2. Volume loss, chronic small vessel ischemic change infarcts, and  multiple old infarcts.    SABA CAIN MD   Chest XR,  PA & LAT    Narrative    CHEST TWO VIEWS    2/17/2021 5:58 PM     HISTORY: Leukocytosis    COMPARISON: Chest x-ray 1/12/2016.      Impression    IMPRESSION: PA and lateral views of the chest. Lungs are clear. Heart  is normal in size. No effusions are evident. No pneumothorax.  Degenerative thoracic spine changes are noted.    ZAFAR OCHOA MD             Most Recent Lab Results In EPIC (For Non-EPIC Providers):    Most Recent 3 CBC's:  Recent Labs   Lab Test 02/18/21  0815 02/17/21  1656 01/15/21  0623   WBC 8.6 19.4* 10.3   HGB 13.9 17.5 14.5   MCV 88 87 87    262 235      Most Recent 3 BMP's:  Recent Labs   Lab Test 02/18/21  0815 02/17/21  1726 02/17/21  1656    139 Canceled, Test credited   POTASSIUM 3.7 3.7 Canceled, Test credited   CHLORIDE 111* 110* Canceled, Test credited    CO2 26 23 Canceled, Test credited   BUN 12 19 Canceled, Test credited   CR 0.84 1.22 Canceled, Test credited   ANIONGAP 6 6 Canceled, Test credited   MARCO ANTONIO 8.6 8.9 Canceled, Test credited   GLC 92 134* Canceled, Test credited     Most Recent 3 Troponin's:  Recent Labs   Lab Test 02/17/21  2219 01/12/16  0045   TROPI <0.015  --    TROPONIN  --  0.00     Most Recent 3 INR's:  Recent Labs   Lab Test 01/14/21  1453   INR 1.12     Most Recent 2 LFT's:  Recent Labs   Lab Test 02/18/21  0815 01/14/21 1927   AST 25 16   ALT 39 28   ALKPHOS 123 99   BILITOTAL 0.4 1.0     Most Recent Cholesterol Panel:  Recent Labs   Lab Test 01/14/21 1927   CHOL 151   LDL 93   HDL 39*   TRIG 93     Most Recent 6 Bacteria Isolates From Any Culture (See EPIC Reports for Culture Details):  Recent Labs   Lab Test 02/17/21 1927 01/12/16  1811 01/12/16  0300 01/12/16  0250 12/29/14  1459   CULT No growth Normal freida No growth No growth <10,000 colonies/mL mixed urogenital freida     Most Recent TSH, T4 and HgbA1c:   Recent Labs   Lab Test 02/18/21  0815 01/14/21 1927   TSH 0.81  --    A1C  --  5.6

## 2021-03-17 NOTE — PROGRESS NOTES
Discharge    Patient discharged to home via private car with brother.    Care plan note:  Patient up ad dede independently.  Good intake.  Denies discomfort.  Expresses happiness in discharging home.  Reminded in not driving.  Reviewed AVS with patient and brother, he was somewhat forgetful on when to take each pill but brother was able to teach back and explain to his brother and states he will set up his meds and will have the patient call him when he takes them.  He will take all the meds in the mornings to ease administration.      Listed belongings gathered and returned to patient. Yes  Care Plan and Patient education resolved: Yes  Prescriptions if needed, hard copies sent with patient  Yes, being filled at [facility] to .  Home and hospital acquired medications returned to patient: NA  Medication Bin checked and emptied on discharge Yes  Follow up appointment made for patient: Yes

## 2021-03-17 NOTE — PLAN OF CARE
"DATE & TIME: 3/16/21 4373-3725  Cognitive Concerns/ Orientation : A&Ox 4  BEHAVIOR & AGGRESSION TOOL COLOR: green     ABNL VS/O2: VSS on RA  MOBILITY: Independent in room, SBA in [place].  PAIN MANAGMENT: denies pain  DIET: Low fat/cardiac diet; refused dinner  BOWEL/BLADDER: continent of B/B   ABNL LAB/BG: NA  DRAIN/DEVICES: NA, No IV access  TELEMETRY RHYTHM: NA  SKIN: scattered bruising, scabs on shins  TESTS/PROCEDURES: nothing this shift  D/C DAY/GOALS/PLACE: discharge to home tomorrow at 10:30am. MD ordered weekly covid test, pt refusing says \"I'm going home tomorrow\"    "

## 2021-03-17 NOTE — PLAN OF CARE
"DATE & TIME: 3/16/21 2524-6289  Cognitive Concerns/ Orientation : A&Ox 4  BEHAVIOR & AGGRESSION TOOL COLOR: green     ABNL VS/O2: VSS on RA  MOBILITY: Independent in room, SBA in [place].  PAIN MANAGMENT: denies pain  DIET: Low fat/cardiac diet; refused dinner on evening shift  BOWEL/BLADDER: continent of B/B   ABNL LAB/BG: NA  DRAIN/DEVICES: NA, No IV access  TELEMETRY RHYTHM: NA  SKIN: scattered bruising, scabs on shins  TESTS/PROCEDURES: nothing this shift  D/C DAY/GOALS/PLACE: discharge to home tomorrow at 10:30am. MD ordered weekly covid test, pt refusing says \"I'm going home tomorrow\"    "

## 2021-03-18 ENCOUNTER — OFFICE VISIT (OUTPATIENT)
Dept: INTERNAL MEDICINE | Facility: CLINIC | Age: 76
End: 2021-03-18
Payer: COMMERCIAL

## 2021-03-18 ENCOUNTER — PATIENT OUTREACH (OUTPATIENT)
Dept: NURSING | Facility: CLINIC | Age: 76
End: 2021-03-18
Payer: COMMERCIAL

## 2021-03-18 VITALS
DIASTOLIC BLOOD PRESSURE: 70 MMHG | BODY MASS INDEX: 21.8 KG/M2 | HEART RATE: 70 BPM | SYSTOLIC BLOOD PRESSURE: 110 MMHG | OXYGEN SATURATION: 96 % | WEIGHT: 164.5 LBS | HEIGHT: 73 IN | TEMPERATURE: 98 F

## 2021-03-18 DIAGNOSIS — Z71.89 OTHER SPECIFIED COUNSELING: ICD-10-CM

## 2021-03-18 DIAGNOSIS — I10 BENIGN ESSENTIAL HYPERTENSION: ICD-10-CM

## 2021-03-18 DIAGNOSIS — I63.9 ACUTE ISCHEMIC STROKE (H): Primary | ICD-10-CM

## 2021-03-18 DIAGNOSIS — R62.7 ADULT FAILURE TO THRIVE: ICD-10-CM

## 2021-03-18 PROBLEM — M79.5 RETAINED BULLET: Status: ACTIVE | Noted: 2021-03-18

## 2021-03-18 PROCEDURE — 99495 TRANSJ CARE MGMT MOD F2F 14D: CPT | Performed by: INTERNAL MEDICINE

## 2021-03-18 RX ORDER — AMLODIPINE BESYLATE 5 MG/1
5 TABLET ORAL DAILY
Qty: 90 TABLET | Refills: 0 | Status: SHIPPED | OUTPATIENT
Start: 2021-03-18

## 2021-03-18 RX ORDER — ASPIRIN 325 MG
325 TABLET ORAL DAILY
Qty: 90 TABLET | Refills: 3 | Status: SHIPPED | OUTPATIENT
Start: 2021-03-18

## 2021-03-18 RX ORDER — ATORVASTATIN CALCIUM 40 MG/1
40 TABLET, FILM COATED ORAL EVERY EVENING
Qty: 90 TABLET | Refills: 0 | Status: SHIPPED | OUTPATIENT
Start: 2021-03-18

## 2021-03-18 ASSESSMENT — MIFFLIN-ST. JEOR: SCORE: 1545.05

## 2021-03-18 NOTE — PROGRESS NOTES
Clinic Care Coordination Contact    Clinic Care Coordination Contact  OUTREACH    Referral Information:  Referral Source: IP Report    Primary Diagnosis: Cognitive Impairment    Chief Complaint   Patient presents with     Clinic Care Coordination - Initial     Clinic Care Coordination - Post Hospital        [place] Utilization: Recent hospitalization for fall and cognitive concerns     Utilization    Last refreshed: 3/18/2021  8:15 AM: Hospital Admissions 2           Last refreshed: 3/18/2021  8:15 AM: ED Visits 2           Last refreshed: 3/18/2021  8:15 AM: No Show Count (past year) 0              Current as of: 3/18/2021  8:15 AM            Clinical Concerns:  CC SW spoke briefly with pt's brother, Jaziel, regarding pt's overall wellbeing. Jaziel shared that he is on his way to pt's home now and doesn't have any information yet to relay on pt's wellbeing. Jaziel shared that he is taking pt to his PCP appointment this afternoon.    CC SW will outreach to Jaziel tomorrow morning to discuss pt's wellbeing and if there are any needs.    Current Medical Concerns:  Cognitive concerns    Current Behavioral Concerns: none    Education Provided to patient: CC role     Health Maintenance Reviewed:    Clinical Pathway: None    Medication Management:  Post-discharge medication reconciliation status: Discharge medications reviewed and reconciled.  Changed medications per note/orders (see AVS).     Functional Status:  Dependent IADLs:: Money Management, Medication Management, Meal Preparation, Cooking, Transportation  Bed or wheelchair confined:: No    Living Situation:  Current living arrangement:: I live alone  Type of residence:: Private home - stairs    Lifestyle & Psychosocial Needs:           Transportation means:: Family     Informal Support system:: Family   Socioeconomic History     Marital status: Single     Spouse name: Not on file     Number of children: 0     Years of education: Not on file     Highest education level:  Not on file   Occupational History     Occupation:      Tobacco Use     Smoking status: Former Smoker     Packs/day: 1.00     Years: 42.00     Pack years: 42.00     Types: Cigarettes     Start date: 1/12/2016     Smokeless tobacco: Never Used   Substance and Sexual Activity     Alcohol use: Yes     Alcohol/week: 0.0 standard drinks     Comment: rarely     Drug use: No     Sexual activity: Yes     Partners: Female      Resources and Interventions:  Current Resources: Employment Status: retired)   )    Advance Care Plan/Directive  Advanced Care Plans/Directives on file:: Yes  Type Advanced Care Plans/Directives: Advanced Directive - On File    Future Appointments              Today Jcarlos Barbosa MD [facility]      Plan: CC SW will outreach to Jaziel 3/19 to discuss pt's needs.    Azeb Blackwell [facility]  Clinic Care Coordinator  [facility] Charline Kenedy  [facility]  [phone]  [email or web address]

## 2021-03-18 NOTE — PLAN OF CARE
Physical Therapy Discharge Summary    Reason for therapy discharge:    Discharged to home with home therapy.    Progress towards therapy goal(s). See goals on Care Plan in [facility] electronic health record for goal details.  Goals not met.  Barriers to achieving goals:   limited tolerance for therapy and discharge from facility.    Therapy recommendation(s):    Continued therapy is recommended.  Rationale/Recommendations:  Therapy recommeding TCU, family declined agreeing they could support pt safely at home, home PT recommended to continue to address ongoing functional deficits.

## 2021-03-19 ENCOUNTER — PATIENT OUTREACH (OUTPATIENT)
Dept: NURSING | Facility: CLINIC | Age: 76
End: 2021-03-19
Payer: COMMERCIAL

## 2021-03-19 DIAGNOSIS — Z23 HIGH PRIORITY FOR 2019-NCOV VACCINE: Primary | ICD-10-CM

## 2021-03-19 SDOH — ECONOMIC STABILITY: FOOD INSECURITY: WITHIN THE PAST 12 MONTHS, THE FOOD YOU BOUGHT JUST DIDN'T LAST AND YOU DIDN'T HAVE MONEY TO GET MORE.: NEVER TRUE

## 2021-03-19 SDOH — ECONOMIC STABILITY: FOOD INSECURITY: WITHIN THE PAST 12 MONTHS, YOU WORRIED THAT YOUR FOOD WOULD RUN OUT BEFORE YOU GOT MONEY TO BUY MORE.: NEVER TRUE

## 2021-03-19 SDOH — SOCIAL STABILITY: SOCIAL NETWORK: HOW OFTEN DO YOU GET TOGETHER WITH FRIENDS OR RELATIVES?: MORE THAN THREE TIMES A WEEK

## 2021-03-19 SDOH — ECONOMIC STABILITY: TRANSPORTATION INSECURITY
IN THE PAST 12 MONTHS, HAS THE LACK OF TRANSPORTATION KEPT YOU FROM MEDICAL APPOINTMENTS OR FROM GETTING MEDICATIONS?: NO

## 2021-03-19 SDOH — ECONOMIC STABILITY: TRANSPORTATION INSECURITY
IN THE PAST 12 MONTHS, HAS LACK OF TRANSPORTATION KEPT YOU FROM MEETINGS, WORK, OR FROM GETTING THINGS NEEDED FOR DAILY LIVING?: NO

## 2021-03-19 SDOH — ECONOMIC STABILITY: INCOME INSECURITY: HOW HARD IS IT FOR YOU TO PAY FOR THE VERY BASICS LIKE FOOD, HOUSING, MEDICAL CARE, AND HEATING?: NOT HARD AT ALL

## 2021-03-19 NOTE — PROGRESS NOTES
Clinic Care Coordination Contact    Clinic Care Coordination Contact  OUTREACH    Referral Information:  Referral Source: IP Report    Primary Diagnosis: Cognitive Impairment    Chief Complaint   Patient presents with     Clinic Care Coordination - Initial     Clinic Care Coordination - Post Hospital        [place] Utilization: Recent hospitalization due to fall and cognitive concerns due to recent stroke  Clinic Utilization  Difficulty keeping appointments:: No  Compliance Concerns: No  No-Show Concerns: No  No PCP office visit in Past Year: No  Utilization    Last refreshed: 3/18/2021  6:44 PM: Hospital Admissions 2           Last refreshed: 3/18/2021  6:44 PM: ED Visits 2           Last refreshed: 3/18/2021  6:44 PM: No Show Count (past year) 0              Current as of: 3/18/2021  6:44 PM            Clinical Concerns:  SARAH ANTOINE spoke with pt's brother regarding pt's overall wellbeing. Jaziel shared that pt is doing well but they are exploring CHRIS's. Pt and his brother are currently on their way to an [facility] in [place]. If pt likes it, he can move in ASAP. SARAH ANTOINE explained it the is going to be a delay in him moving into the [facility] to outreach to CC SW or PCP to put in an order for Home Care, SARAH ANTOINE expressed this importance of pt not losing any progress he made while in the hospital.    Jaziel inquired about pt receiving the 2nd dose of Moderna vaccine. Pt received the 1st dose in the beginning of February, around the 5th, at [facility]. Pt left TCU AMA and didn't receive his 2nd dose. Jaziel shared that he trying the number provided and he couldn't speak with anyone.    SARAH ANTOINE called [facility] scheduling line and spoke with a . She explained a order needs to be placed by his provider and then an appointment can be scheduled.    SARAH ANTOINE returned call to Jaziel and relayed this information. SARAH ANTOINE encouraged pt to call on Tuesday morning.    Current Medical Concerns:  Cognitive concerns    Current Behavioral Concerns:  none    Education Provided to patient: CC role   Pain  Pain (GOAL):: No  Health Maintenance Reviewed: Up to date  Clinical Pathway: None    Medication Management:  Post-discharge medication reconciliation status: Discharge medications reviewed and reconciled.  Changed medications per note/orders (see AVS).     Functional Status:  Dependent ADLs:: Independent  Dependent IADLs:: Money Management, Medication Management, Meal Preparation, Cooking, Transportation  Bed or wheelchair confined:: No  Mobility Status: Independent  Fallen 2 or more times in the past year?: No  Any fall with injury in the past year?: Yes    Living Situation:  Current living arrangement:: I live alone  Type of residence:: [facility]    Lifestyle & Psychosocial Needs:     Social Needs     Financial resource strain: Not hard at all     Food insecurity     Worry: Never true     Inability: Never true     Transportation needs     Medical: No     Non-medical: No     Diet:: Regular  Inadequate nutrition (GOAL):: No  Tube Feeding: No  Inadequate activity/exercise (GOAL):: No  Significant changes in sleep pattern (GOAL): No  Transportation means:: Family     Confucianism or spiritual beliefs that impact treatment:: No  Mental health DX:: No  Mental health management concern (GOAL):: No  Chemical Dependency Status: No Current Concerns  Informal Support system:: Family   Socioeconomic History     Marital status: Single     Spouse name: Not on file     Number of children: 0     Years of education: Not on file     Highest education level: Not on file   Occupational History     Occupation:    Relationships     Social connections     Talks on phone: Not on file     Gets together: More than three times a week     Attends Judaism service: Not on file     Active member of club or organization: Not on file     Attends meetings of clubs or organizations: Not on file     Relationship status: Not on file     Intimate partner violence     Fear of  current or ex partner: Not on file     Emotionally abused: Not on file     Physically abused: Not on file     Forced sexual activity: Not on file     Tobacco Use     Smoking status: Former Smoker     Packs/day: 1.00     Years: 42.00     Pack years: 42.00     Types: Cigarettes     Start date: 1/12/2016     Smokeless tobacco: Never Used   Substance and Sexual Activity     Alcohol use: Yes     Alcohol/week: 0.0 standard drinks     Comment: rarely     Drug use: No     Sexual activity: Yes     Partners: Female      Resources and Interventions:  Current Resources:    Community Resources: None  Supplies used at home:: None  Equipment Currently Used at Home: none  Employment Status: retired)   )    Advance Care Plan/Directive  Advanced Care Plans/Directives on file:: Yes  Type Advanced Care Plans/Directives: Advanced Directive - On File    Referrals Placed: None     Patient/Caregiver understanding: Pt reports understanding and denies any additional questions or concerns at this times. SW CC engaged in AIDET communication during encounter.    Plan: At this time, pt denies outstanding need for connection or referral to resources or assistance navigating recommended follow up care. No further outreaches will be made at this time unless a new referral is made or a change in the pt's status occurs. Patient was provided with CC SW contact information and encouraged to call with any questions or concerns.    Azeb Blackwell [facility]  Clinic Care Coordinator  [facility] Charline McCurtain  [facility]  [phone]  [email or web address]

## 2021-03-22 ENCOUNTER — TRANSFERRED RECORDS (OUTPATIENT)
Dept: HEALTH INFORMATION MANAGEMENT | Facility: CLINIC | Age: 76
End: 2021-03-22

## 2021-03-22 ENCOUNTER — PATIENT OUTREACH (OUTPATIENT)
Dept: NURSING | Facility: CLINIC | Age: 76
End: 2021-03-22
Payer: COMMERCIAL

## 2021-03-22 ENCOUNTER — MEDICAL CORRESPONDENCE (OUTPATIENT)
Dept: HEALTH INFORMATION MANAGEMENT | Facility: CLINIC | Age: 76
End: 2021-03-22

## 2021-03-22 ENCOUNTER — DOCUMENTATION ONLY (OUTPATIENT)
Dept: OTHER | Facility: CLINIC | Age: 76
End: 2021-03-22

## 2021-03-22 NOTE — PROGRESS NOTES
SW post hospital note.  Patient's Behavioral , Lance Nevarez.  under his Continuance for [facility] has been notified that patient discharged home with brother, Jaziel, assuming responsibility for patient and that Jaziel declined the skilled home care orders for RN and OT.  The signed [place], discharge summary, pertinent charting and Diagnostic Assessment were all faxed to  [facility].

## 2021-03-22 NOTE — PROGRESS NOTES
Clinic Care Coordination Contact    Follow Up Progress Note      Assessment: SARAH ANTOINE spoke with pt's brother and sister-in-law. They shared that pt will be moving to [facility] in [place] tomorrow.    They spoke with residence about getting pt his second Moderna vaccine but they were unsure if they are get it for him. SARAH ANTOINE shared the current locations in [facility] the are providing moderna and they are not able to get pt to any of those sites. SARAH ANTOINE continued to encourage outreach to COVID scheduling line tomorrow to determine if more locations open up.    Sister-in-law inquired about locations further south of the [facility] that would be easier to access after pt's moves. SARAH ANTOINE explained that there are many pharmacies and other sites that are offering the vaccine but not through [facility]. SARAH ANTOINE provided the website through [facility] to locate all the vaccine locations in their area. She stated she will look into this.    Plan: No further concerns at this time. Family is very optimistic that pt's move to [facility] near them will be a good fit to support pt with this ongoing medical needs. SARAH ANTOINE encouraged outreach with any further needs.    Azeb Blackwell [facility]  Clinic Care Coordinator  [facility] Charline Uintah  [facility]  [phone]  [email or web address]

## 2024-05-10 NOTE — PROGRESS NOTES
Assessment & Plan     Acute ischemic stroke (H)  Benign essential hypertension  Adult failure to thrive  -Since the patient's discharge his family and he have refused home care services.  -His brother states they will be moving him to assisted living near his house and setting up care with a provider there.    Review of external notes as documented elsewhere in note  Review of the result(s) of each unique test - hospital labs  Diagnosis or treatment significantly limited by social determinants of health - cognitive impairment.    independent historian- brother                No follow-ups on file.    Jcarlos Barbosa MD  [facility]    Xiomara Garcia is a 75 year old who presents for the following health issues     HPI       Hospital Follow-up Visit:    Hospital/Nursing Home/IP Rehab Facility: [facility]  Date of Admission: 2--17-21  Date of Discharge: 3-17-21  Reason(s) for Admission: Fall, likely in the setting of physical deconditioning and recent CVA:    Was your hospitalization related to COVID-19? No   Problems taking medications regularly:  None  Medication changes since discharge: Albuterol, aspirin, amlodipine, atorvastatin  Problems adhering to non-medication therapy:  Fall, likely in the setting of physical deconditioning and recent CVA:    Summary of hospitalization:  [facility] discharge summary reviewed  Diagnostic Tests/Treatments reviewed.  Follow up needed: none  Other Healthcare Providers Involved in Patient s Care:         None  Update since discharge: stable.       Post Discharge Medication Reconciliation: discharge medications reconciled, continue medications without change.  Plan of care communicated with patient and family              Review of Systems   Constitutional, HEENT, cardiovascular, pulmonary, gi and gu systems are negative, except as otherwise noted.      Objective    /70   Pulse 70   Temp 98  F (36.7  C)  [Doing Well] : is doing well "(Tympanic)   Ht 1.854 m (6' 1\")   Wt 74.6 kg (164 lb 8 oz)   SpO2 96%   BMI 21.70 kg/m    Body mass index is 21.7 kg/m .  Physical Exam   GENERAL APPEARANCE: alert and no distress  CV: regular rates and rhythm  MS: extremities normal- no gross deformities noted  NEURO: Gait is slightly unstable with a limp of the right lower extremity                " [Excellent Pain Control] : has excellent pain control [No Sign of Infection] : is showing no signs of infection

## 2024-08-13 NOTE — PLAN OF CARE
DATE & TIME: 3/29068-1180  Cognitive Concerns/ Orientation: A&Ox2-3, disoriented to situation and time.  Calm and cooperative. Flat affect, withdrawn.   ABNL VS/O2: VSS on room air, remains afebrile.   MOBILITY: SBA with walker and GB  PAIN MANAGMENT: Denies pain  DIET: 2g Na/low sat fat, good appetite  BOWEL/BLADDER: Continent. Up to bathroom.   ABNL LAB/BG: N/A  DRAIN/DEVICES: No IV access.  SKIN: Scattered bruising  TESTS/PROCEDURES: N/A   D/C DAY/GOALS/PLACE: Pending discharge. Commitment and eventual guardianship per psych note.   OTHER IMPORTANT INFO: On court hold. SW following for disposition. PT/OT. A calendar was given to patient to assist in keeping his oriented to the month, day/date and year. Per report, [facility] called and stated court hold effective as of 3/3/2021 1618. Paperwork/documentations on the chart.    \David Marie  : 1961  Primary: [employer] ([employer])  Secondary:  [facility]  [address]  [address] [place] [id number]  Phone: [phone]  Fax: [phone] Plan Frequency: 1-2x/wk for 6 weeks    Plan of Care/Certification Expiration Date: 24        Plan of Care/Certification Expiration Date:  Plan of Care/Certification Expiration Date: 24    Frequency/Duration:   Plan Frequency: 1-2x/wk for 6 weeks      Time In/Out:   Time In: 730  Time Out: 08      PT Visit Info:    Total # of Visits to Date: 4      Visit Count:  4    OUTPATIENT PHYSICAL THERAPY:   Treatment Note 2024       Episode  (R shoulder resurfacing)               Treatment Diagnosis:    Stiffness of right shoulder, not elsewhere classified  Right shoulder pain, unspecified chronicity  Medical/Referring Diagnosis:    Pain in right shoulder [M25.511]  Other chronic pain [G89.29]      Referring Physician:  Josephine Perdomo APRN - NP  MD Orders:  PT Eval and Treat, 1-2x/wk for 4-6 weeks  Return MD Appt:  24   Date of Onset:  Onset Date: 24     Allergies:   Bee venom  Restrictions/Precautions:   Post Surgical Precautions: R shoulder resurfacing      Interventions Planned (Treatment may consist of any combination of the following):     See Assessment Note    Subjective Comments:   Pt reports no pain today. Had some fatigue after last session but no true pain.   Initial Pain Level::     0/10  Post Session Pain Level:       0/10  Medications Last Reviewed:  2024  Updated Objective Findings:  None Today  Treatment   THERAPEUTIC EXERCISE: (40 minutes):    Exercises per grid below to improve mobility, strength, balance, and coordination.  Required minimal verbal and tactile cues to promote proper body alignment, promote proper body posture, and promote proper body mechanics.  Progressed resistance, range, repetitions, and complexity of movement as indicated.   Date:  24

## 2024-12-09 ENCOUNTER — LAB REQUISITION (OUTPATIENT)
Dept: LAB | Facility: CLINIC | Age: 79
End: 2024-12-09
Payer: COMMERCIAL

## 2024-12-09 ENCOUNTER — MEDICAL CORRESPONDENCE (OUTPATIENT)
Dept: HEALTH INFORMATION MANAGEMENT | Facility: CLINIC | Age: 79
End: 2024-12-09
Payer: COMMERCIAL

## 2024-12-09 DIAGNOSIS — Z11.1 ENCOUNTER FOR SCREENING FOR RESPIRATORY TUBERCULOSIS: ICD-10-CM

## 2024-12-10 ENCOUNTER — APPOINTMENT (OUTPATIENT)
Dept: GENERAL RADIOLOGY | Facility: CLINIC | Age: 79
DRG: 871 | End: 2024-12-10
Attending: EMERGENCY MEDICINE
Payer: COMMERCIAL

## 2024-12-10 ENCOUNTER — HOSPITAL ENCOUNTER (INPATIENT)
Facility: CLINIC | Age: 79
End: 2024-12-10
Attending: EMERGENCY MEDICINE | Admitting: INTERNAL MEDICINE
Payer: COMMERCIAL

## 2024-12-10 ENCOUNTER — APPOINTMENT (OUTPATIENT)
Dept: GENERAL RADIOLOGY | Facility: CLINIC | Age: 79
DRG: 871 | End: 2024-12-10
Attending: SURGERY
Payer: COMMERCIAL

## 2024-12-10 DIAGNOSIS — N30.01 ACUTE CYSTITIS WITH HEMATURIA: ICD-10-CM

## 2024-12-10 DIAGNOSIS — R62.7 ADULT FAILURE TO THRIVE: ICD-10-CM

## 2024-12-10 DIAGNOSIS — K92.2 LOWER GI BLEED: ICD-10-CM

## 2024-12-10 DIAGNOSIS — E87.6 HYPOKALEMIA: ICD-10-CM

## 2024-12-10 DIAGNOSIS — R65.21 SEPTIC SHOCK (H): Primary | ICD-10-CM

## 2024-12-10 DIAGNOSIS — J96.01 ACUTE HYPOXEMIC RESPIRATORY FAILURE (H): ICD-10-CM

## 2024-12-10 DIAGNOSIS — E83.51 HYPOCALCEMIA: ICD-10-CM

## 2024-12-10 DIAGNOSIS — A41.9 SEPTIC SHOCK (H): Primary | ICD-10-CM

## 2024-12-10 LAB
ABO + RH BLD: NORMAL
ALBUMIN SERPL BCG-MCNC: 1.3 G/DL (ref 3.5–5.2)
ALBUMIN UR-MCNC: 20 MG/DL
ALP SERPL-CCNC: 81 U/L (ref 40–150)
ALT SERPL W P-5'-P-CCNC: 13 U/L (ref 0–70)
ANION GAP SERPL CALCULATED.3IONS-SCNC: 15 MMOL/L (ref 7–15)
ANION GAP SERPL CALCULATED.3IONS-SCNC: 16 MMOL/L (ref 7–15)
APPEARANCE UR: ABNORMAL
AST SERPL W P-5'-P-CCNC: 25 U/L (ref 0–45)
ATRIAL RATE - MUSE: 118 BPM
BACTERIA #/AREA URNS HPF: ABNORMAL /HPF
BASE EXCESS BLDV CALC-SCNC: 2 MMOL/L (ref -3–3)
BASOPHILS # BLD MANUAL: 0 10E3/UL (ref 0–0.2)
BASOPHILS NFR BLD MANUAL: 0 %
BILIRUB SERPL-MCNC: 0.4 MG/DL
BILIRUB UR QL STRIP: NEGATIVE
BLD GP AB SCN SERPL QL: NEGATIVE
BLD PROD TYP BPU: NORMAL
BLOOD COMPONENT TYPE: NORMAL
BUN SERPL-MCNC: 16.3 MG/DL (ref 8–23)
BUN SERPL-MCNC: 31.5 MG/DL (ref 8–23)
BURR CELLS BLD QL SMEAR: SLIGHT
CALCIUM SERPL-MCNC: 3.5 MG/DL (ref 8.8–10.4)
CALCIUM SERPL-MCNC: 9.4 MG/DL (ref 8.8–10.4)
CALCIUM SERPL-MCNC: 9.6 MG/DL (ref 8.8–10.4)
CHLORIDE SERPL-SCNC: 109 MMOL/L (ref 98–107)
CHLORIDE SERPL-SCNC: 124 MMOL/L (ref 98–107)
CODING SYSTEM: NORMAL
COLOR UR AUTO: YELLOW
CREAT SERPL-MCNC: 0.73 MG/DL (ref 0.67–1.17)
CREAT SERPL-MCNC: 1.33 MG/DL (ref 0.67–1.17)
CROSSMATCH: NORMAL
D DIMER PPP FEU-MCNC: >20 UG/ML FEU (ref 0–0.5)
DIASTOLIC BLOOD PRESSURE - MUSE: NORMAL MMHG
EGFRCR SERPLBLD CKD-EPI 2021: 55 ML/MIN/1.73M2
EGFRCR SERPLBLD CKD-EPI 2021: >90 ML/MIN/1.73M2
EOSINOPHIL # BLD MANUAL: 0 10E3/UL (ref 0–0.7)
EOSINOPHIL NFR BLD MANUAL: 0 %
ERYTHROCYTE [DISTWIDTH] IN BLOOD BY AUTOMATED COUNT: 14 % (ref 10–15)
ERYTHROCYTE [DISTWIDTH] IN BLOOD BY AUTOMATED COUNT: 14 % (ref 10–15)
ERYTHROCYTE [DISTWIDTH] IN BLOOD BY AUTOMATED COUNT: 14.1 % (ref 10–15)
FIBRINOGEN PPP-MCNC: 184 MG/DL (ref 170–510)
FIBRINOGEN PPP-MCNC: 344 MG/DL (ref 170–510)
GLUCOSE BLDC GLUCOMTR-MCNC: 100 MG/DL (ref 70–99)
GLUCOSE BLDC GLUCOMTR-MCNC: 105 MG/DL (ref 70–99)
GLUCOSE BLDC GLUCOMTR-MCNC: 114 MG/DL (ref 70–99)
GLUCOSE BLDC GLUCOMTR-MCNC: 62 MG/DL (ref 70–99)
GLUCOSE BLDC GLUCOMTR-MCNC: 92 MG/DL (ref 70–99)
GLUCOSE SERPL-MCNC: 110 MG/DL (ref 70–99)
GLUCOSE SERPL-MCNC: 211 MG/DL (ref 70–99)
GLUCOSE UR STRIP-MCNC: NEGATIVE MG/DL
HCO3 BLDV-SCNC: 22 MMOL/L (ref 21–28)
HCO3 BLDV-SCNC: 27 MMOL/L (ref 21–28)
HCO3 SERPL-SCNC: 21 MMOL/L (ref 22–29)
HCO3 SERPL-SCNC: 9 MMOL/L (ref 22–29)
HCT VFR BLD AUTO: 13.3 % (ref 40–53)
HCT VFR BLD AUTO: 29.4 % (ref 40–53)
HCT VFR BLD AUTO: 34 % (ref 40–53)
HEMOCCULT STL QL: POSITIVE
HGB BLD-MCNC: 10.8 G/DL (ref 13.3–17.7)
HGB BLD-MCNC: 10.9 G/DL (ref 13.3–17.7)
HGB BLD-MCNC: 11 G/DL (ref 13.3–17.7)
HGB BLD-MCNC: 3.8 G/DL (ref 13.3–17.7)
HGB BLD-MCNC: 9.1 G/DL (ref 13.3–17.7)
HGB UR QL STRIP: ABNORMAL
HOLD SPECIMEN: NORMAL
INR PPP: 1.34 (ref 0.85–1.15)
INR PPP: 1.86 (ref 0.85–1.15)
INTERPRETATION ECG - MUSE: NORMAL
ISSUE DATE AND TIME: NORMAL
KETONES UR STRIP-MCNC: 10 MG/DL
LACTATE BLD-SCNC: 9.2 MMOL/L
LACTATE SERPL-SCNC: 2.1 MMOL/L (ref 0.7–2)
LACTATE SERPL-SCNC: 7 MMOL/L (ref 0.7–2)
LEUKOCYTE ESTERASE UR QL STRIP: ABNORMAL
LIPASE SERPL-CCNC: 7 U/L (ref 13–60)
LYMPHOCYTES # BLD MANUAL: 0.2 10E3/UL (ref 0.8–5.3)
LYMPHOCYTES NFR BLD MANUAL: 1 %
MAGNESIUM SERPL-MCNC: 1.5 MG/DL (ref 1.7–2.3)
MAGNESIUM SERPL-MCNC: 1.6 MG/DL (ref 1.7–2.3)
MAGNESIUM SERPL-MCNC: 2.7 MG/DL (ref 1.7–2.3)
MCH RBC QN AUTO: 28.1 PG (ref 26.5–33)
MCH RBC QN AUTO: 28.4 PG (ref 26.5–33)
MCH RBC QN AUTO: 28.8 PG (ref 26.5–33)
MCHC RBC AUTO-ENTMCNC: 28.6 G/DL (ref 31.5–36.5)
MCHC RBC AUTO-ENTMCNC: 31 G/DL (ref 31.5–36.5)
MCHC RBC AUTO-ENTMCNC: 32.1 G/DL (ref 31.5–36.5)
MCV RBC AUTO: 90 FL (ref 78–100)
MCV RBC AUTO: 92 FL (ref 78–100)
MCV RBC AUTO: 99 FL (ref 78–100)
MONOCYTES # BLD MANUAL: 0 10E3/UL (ref 0–1.3)
MONOCYTES NFR BLD MANUAL: 0 %
MUCOUS THREADS #/AREA URNS LPF: PRESENT /LPF
NEUTROPHILS # BLD MANUAL: 19 10E3/UL (ref 1.6–8.3)
NEUTROPHILS NFR BLD MANUAL: 99 %
NITRATE UR QL: POSITIVE
O2/TOTAL GAS SETTING VFR VENT: 100 %
OXYHGB MFR BLDV: 36 % (ref 70–75)
P AXIS - MUSE: 78 DEGREES
PCO2 BLDV: 40 MM HG (ref 40–50)
PCO2 BLDV: 41 MM HG (ref 40–50)
PH BLDV: 7.34 [PH] (ref 7.32–7.43)
PH BLDV: 7.43 [PH] (ref 7.32–7.43)
PH UR STRIP: 6 [PH] (ref 5–7)
PHOSPHATE SERPL-MCNC: 1.3 MG/DL (ref 2.5–4.5)
PHOSPHATE SERPL-MCNC: 5.4 MG/DL (ref 2.5–4.5)
PLAT MORPH BLD: ABNORMAL
PLATELET # BLD AUTO: 149 10E3/UL (ref 150–450)
PLATELET # BLD AUTO: 184 10E3/UL (ref 150–450)
PLATELET # BLD AUTO: 99 10E3/UL (ref 150–450)
PO2 BLDV: 23 MM HG (ref 25–47)
PO2 BLDV: 27 MM HG (ref 25–47)
POLYCHROMASIA BLD QL SMEAR: SLIGHT
POTASSIUM SERPL-SCNC: 1.8 MMOL/L (ref 3.4–5.3)
POTASSIUM SERPL-SCNC: 3.8 MMOL/L (ref 3.4–5.3)
POTASSIUM SERPL-SCNC: 4.2 MMOL/L (ref 3.4–5.3)
PR INTERVAL - MUSE: 132 MS
PROT SERPL-MCNC: 2.3 G/DL (ref 6.4–8.3)
QRS DURATION - MUSE: 78 MS
QT - MUSE: 336 MS
QTC - MUSE: 470 MS
R AXIS - MUSE: 68 DEGREES
RBC # BLD AUTO: 1.35 10E6/UL (ref 4.4–5.9)
RBC # BLD AUTO: 3.2 10E6/UL (ref 4.4–5.9)
RBC # BLD AUTO: 3.78 10E6/UL (ref 4.4–5.9)
RBC MORPH BLD: ABNORMAL
RBC URINE: 40 /HPF
SAO2 % BLDV: 36.2 % (ref 70–75)
SAO2 % BLDV: 46 % (ref 70–75)
SMUDGE CELLS BLD QL SMEAR: PRESENT
SODIUM SERPL-SCNC: 146 MMOL/L (ref 135–145)
SODIUM SERPL-SCNC: 148 MMOL/L (ref 135–145)
SP GR UR STRIP: 1.02 (ref 1–1.03)
SPECIMEN EXP DATE BLD: NORMAL
SQUAMOUS EPITHELIAL: <1 /HPF
SYSTOLIC BLOOD PRESSURE - MUSE: NORMAL MMHG
T AXIS - MUSE: 74 DEGREES
TROPONIN T SERPL HS-MCNC: 26 NG/L
TROPONIN T SERPL HS-MCNC: 33 NG/L
TROPONIN T SERPL HS-MCNC: 34 NG/L
UNIT ABO/RH: NORMAL
UNIT NUMBER: NORMAL
UNIT STATUS: NORMAL
UNIT TYPE ISBT: 5100
UNIT TYPE ISBT: 6200
UNIT TYPE ISBT: 9500
UROBILINOGEN UR STRIP-MCNC: NORMAL MG/DL
VENTRICULAR RATE- MUSE: 118 BPM
WBC # BLD AUTO: 19.2 10E3/UL (ref 4–11)
WBC # BLD AUTO: 44.9 10E3/UL (ref 4–11)
WBC # BLD AUTO: 47.4 10E3/UL (ref 4–11)
WBC URINE: 111 /HPF

## 2024-12-10 PROCEDURE — 85384 FIBRINOGEN ACTIVITY: CPT | Performed by: EMERGENCY MEDICINE

## 2024-12-10 PROCEDURE — 272N000272 HC CONTINUOUS NEBULIZER MICRO PUMP

## 2024-12-10 PROCEDURE — 258N000003 HC RX IP 258 OP 636: Performed by: EMERGENCY MEDICINE

## 2024-12-10 PROCEDURE — 84132 ASSAY OF SERUM POTASSIUM: CPT | Performed by: INTERNAL MEDICINE

## 2024-12-10 PROCEDURE — 71045 X-RAY EXAM CHEST 1 VIEW: CPT

## 2024-12-10 PROCEDURE — 999N000157 HC STATISTIC RCP TIME EA 10 MIN

## 2024-12-10 PROCEDURE — 87186 SC STD MICRODIL/AGAR DIL: CPT | Performed by: EMERGENCY MEDICINE

## 2024-12-10 PROCEDURE — 85014 HEMATOCRIT: CPT | Performed by: INTERNAL MEDICINE

## 2024-12-10 PROCEDURE — 96375 TX/PRO/DX INJ NEW DRUG ADDON: CPT

## 2024-12-10 PROCEDURE — 96365 THER/PROPH/DIAG IV INF INIT: CPT | Mod: 59

## 2024-12-10 PROCEDURE — 85007 BL SMEAR W/DIFF WBC COUNT: CPT | Performed by: EMERGENCY MEDICINE

## 2024-12-10 PROCEDURE — 86901 BLOOD TYPING SEROLOGIC RH(D): CPT | Performed by: EMERGENCY MEDICINE

## 2024-12-10 PROCEDURE — 99292 CRITICAL CARE ADDL 30 MIN: CPT

## 2024-12-10 PROCEDURE — 250N000009 HC RX 250: Performed by: INTERNAL MEDICINE

## 2024-12-10 PROCEDURE — 99291 CRITICAL CARE FIRST HOUR: CPT | Performed by: SURGERY

## 2024-12-10 PROCEDURE — 258N000001 HC RX 258: Performed by: EMERGENCY MEDICINE

## 2024-12-10 PROCEDURE — 99223 1ST HOSP IP/OBS HIGH 75: CPT | Performed by: INTERNAL MEDICINE

## 2024-12-10 PROCEDURE — 96374 THER/PROPH/DIAG INJ IV PUSH: CPT | Mod: 59

## 2024-12-10 PROCEDURE — 94660 CPAP INITIATION&MGMT: CPT

## 2024-12-10 PROCEDURE — 99207 PR NO CHARGE LOS: CPT | Performed by: INTERNAL MEDICINE

## 2024-12-10 PROCEDURE — P9059 PLASMA, FRZ BETWEEN 8-24HOUR: HCPCS | Performed by: EMERGENCY MEDICINE

## 2024-12-10 PROCEDURE — 82805 BLOOD GASES W/O2 SATURATION: CPT | Performed by: SURGERY

## 2024-12-10 PROCEDURE — 85610 PROTHROMBIN TIME: CPT | Performed by: INTERNAL MEDICINE

## 2024-12-10 PROCEDURE — 85018 HEMOGLOBIN: CPT | Performed by: INTERNAL MEDICINE

## 2024-12-10 PROCEDURE — 5A09457 ASSISTANCE WITH RESPIRATORY VENTILATION, 24-96 CONSECUTIVE HOURS, CONTINUOUS POSITIVE AIRWAY PRESSURE: ICD-10-PCS | Performed by: INTERNAL MEDICINE

## 2024-12-10 PROCEDURE — 93005 ELECTROCARDIOGRAM TRACING: CPT

## 2024-12-10 PROCEDURE — P9059 PLASMA, FRZ BETWEEN 8-24HOUR: HCPCS

## 2024-12-10 PROCEDURE — 83605 ASSAY OF LACTIC ACID: CPT

## 2024-12-10 PROCEDURE — P9035 PLATELET PHERES LEUKOREDUCED: HCPCS | Performed by: EMERGENCY MEDICINE

## 2024-12-10 PROCEDURE — 250N000011 HC RX IP 250 OP 636: Performed by: EMERGENCY MEDICINE

## 2024-12-10 PROCEDURE — 84484 ASSAY OF TROPONIN QUANT: CPT | Performed by: INTERNAL MEDICINE

## 2024-12-10 PROCEDURE — P9016 RBC LEUKOCYTES REDUCED: HCPCS | Performed by: EMERGENCY MEDICINE

## 2024-12-10 PROCEDURE — 85384 FIBRINOGEN ACTIVITY: CPT | Performed by: INTERNAL MEDICINE

## 2024-12-10 PROCEDURE — 82310 ASSAY OF CALCIUM: CPT | Performed by: INTERNAL MEDICINE

## 2024-12-10 PROCEDURE — 83605 ASSAY OF LACTIC ACID: CPT | Performed by: INTERNAL MEDICINE

## 2024-12-10 PROCEDURE — 86923 COMPATIBILITY TEST ELECTRIC: CPT | Performed by: EMERGENCY MEDICINE

## 2024-12-10 PROCEDURE — 999N000065 XR CHEST PORT 1 VIEW

## 2024-12-10 PROCEDURE — 36415 COLL VENOUS BLD VENIPUNCTURE: CPT | Performed by: INTERNAL MEDICINE

## 2024-12-10 PROCEDURE — 3E043XZ INTRODUCTION OF VASOPRESSOR INTO CENTRAL VEIN, PERCUTANEOUS APPROACH: ICD-10-PCS | Performed by: INTERNAL MEDICINE

## 2024-12-10 PROCEDURE — 99291 CRITICAL CARE FIRST HOUR: CPT | Mod: 25

## 2024-12-10 PROCEDURE — 87040 BLOOD CULTURE FOR BACTERIA: CPT | Performed by: EMERGENCY MEDICINE

## 2024-12-10 PROCEDURE — 999N000185 HC STATISTIC TRANSPORT TIME EA 15 MIN

## 2024-12-10 PROCEDURE — 84100 ASSAY OF PHOSPHORUS: CPT | Performed by: INTERNAL MEDICINE

## 2024-12-10 PROCEDURE — 250N000009 HC RX 250: Performed by: EMERGENCY MEDICINE

## 2024-12-10 PROCEDURE — P9037 PLATE PHERES LEUKOREDU IRRAD: HCPCS

## 2024-12-10 PROCEDURE — 82310 ASSAY OF CALCIUM: CPT | Performed by: EMERGENCY MEDICINE

## 2024-12-10 PROCEDURE — 71045 X-RAY EXAM CHEST 1 VIEW: CPT | Mod: 77

## 2024-12-10 PROCEDURE — 94640 AIRWAY INHALATION TREATMENT: CPT

## 2024-12-10 PROCEDURE — 250N000011 HC RX IP 250 OP 636: Mod: JZ | Performed by: INTERNAL MEDICINE

## 2024-12-10 PROCEDURE — 05HN33Z INSERTION OF INFUSION DEVICE INTO LEFT INTERNAL JUGULAR VEIN, PERCUTANEOUS APPROACH: ICD-10-PCS | Performed by: EMERGENCY MEDICINE

## 2024-12-10 PROCEDURE — P9016 RBC LEUKOCYTES REDUCED: HCPCS

## 2024-12-10 PROCEDURE — 83690 ASSAY OF LIPASE: CPT | Performed by: EMERGENCY MEDICINE

## 2024-12-10 PROCEDURE — 51702 INSERT TEMP BLADDER CATH: CPT

## 2024-12-10 PROCEDURE — 82374 ASSAY BLOOD CARBON DIOXIDE: CPT | Performed by: EMERGENCY MEDICINE

## 2024-12-10 PROCEDURE — 250N000011 HC RX IP 250 OP 636: Performed by: SURGERY

## 2024-12-10 PROCEDURE — 250N000011 HC RX IP 250 OP 636: Performed by: INTERNAL MEDICINE

## 2024-12-10 PROCEDURE — 82962 GLUCOSE BLOOD TEST: CPT

## 2024-12-10 PROCEDURE — 85027 COMPLETE CBC AUTOMATED: CPT | Performed by: EMERGENCY MEDICINE

## 2024-12-10 PROCEDURE — 81001 URINALYSIS AUTO W/SCOPE: CPT | Performed by: EMERGENCY MEDICINE

## 2024-12-10 PROCEDURE — 36556 INSERT NON-TUNNEL CV CATH: CPT

## 2024-12-10 PROCEDURE — 258N000003 HC RX IP 258 OP 636: Performed by: INTERNAL MEDICINE

## 2024-12-10 PROCEDURE — 85379 FIBRIN DEGRADATION QUANT: CPT | Performed by: EMERGENCY MEDICINE

## 2024-12-10 PROCEDURE — 36415 COLL VENOUS BLD VENIPUNCTURE: CPT | Performed by: EMERGENCY MEDICINE

## 2024-12-10 PROCEDURE — 200N000001 HC R&B ICU

## 2024-12-10 PROCEDURE — 82272 OCCULT BLD FECES 1-3 TESTS: CPT | Performed by: EMERGENCY MEDICINE

## 2024-12-10 PROCEDURE — 83735 ASSAY OF MAGNESIUM: CPT | Performed by: INTERNAL MEDICINE

## 2024-12-10 PROCEDURE — 84484 ASSAY OF TROPONIN QUANT: CPT | Performed by: EMERGENCY MEDICINE

## 2024-12-10 RX ORDER — HYDROMORPHONE HCL IN WATER/PF 6 MG/30 ML
0.2 PATIENT CONTROLLED ANALGESIA SYRINGE INTRAVENOUS
Status: DISCONTINUED | OUTPATIENT
Start: 2024-12-10 | End: 2024-12-13

## 2024-12-10 RX ORDER — SENNOSIDES 8.6 MG
1 TABLET ORAL 2 TIMES DAILY PRN
Status: DISCONTINUED | OUTPATIENT
Start: 2024-12-10 | End: 2024-12-13

## 2024-12-10 RX ORDER — LORAZEPAM 2 MG/ML
0.5 INJECTION INTRAMUSCULAR
Status: DISCONTINUED | OUTPATIENT
Start: 2024-12-10 | End: 2024-12-13

## 2024-12-10 RX ORDER — CARBOXYMETHYLCELLULOSE SODIUM 5 MG/ML
1 SOLUTION/ DROPS OPHTHALMIC
Status: DISCONTINUED | OUTPATIENT
Start: 2024-12-10 | End: 2024-12-10

## 2024-12-10 RX ORDER — VANCOMYCIN HYDROCHLORIDE 1 G/200ML
1000 INJECTION, SOLUTION INTRAVENOUS EVERY 12 HOURS
Status: DISCONTINUED | OUTPATIENT
Start: 2024-12-10 | End: 2024-12-10

## 2024-12-10 RX ORDER — NICOTINE POLACRILEX 4 MG
15-30 LOZENGE BUCCAL
Status: DISCONTINUED | OUTPATIENT
Start: 2024-12-10 | End: 2024-12-10

## 2024-12-10 RX ORDER — CHLORAL HYDRATE 500 MG
1 CAPSULE ORAL DAILY
Status: ON HOLD | COMMUNITY
End: 2024-12-19

## 2024-12-10 RX ORDER — ONDANSETRON 4 MG/1
4 TABLET, ORALLY DISINTEGRATING ORAL EVERY 6 HOURS PRN
Status: DISCONTINUED | OUTPATIENT
Start: 2024-12-10 | End: 2024-12-10

## 2024-12-10 RX ORDER — PIPERACILLIN SODIUM, TAZOBACTAM SODIUM 4; .5 G/20ML; G/20ML
4.5 INJECTION, POWDER, LYOPHILIZED, FOR SOLUTION INTRAVENOUS ONCE
Status: COMPLETED | OUTPATIENT
Start: 2024-12-10 | End: 2024-12-10

## 2024-12-10 RX ORDER — MINERAL OIL/HYDROPHIL PETROLAT
OINTMENT (GRAM) TOPICAL
Status: DISCONTINUED | OUTPATIENT
Start: 2024-12-10 | End: 2024-12-16

## 2024-12-10 RX ORDER — NALOXONE HYDROCHLORIDE 0.4 MG/ML
0.2 INJECTION, SOLUTION INTRAMUSCULAR; INTRAVENOUS; SUBCUTANEOUS
Status: DISCONTINUED | OUTPATIENT
Start: 2024-12-10 | End: 2024-12-13

## 2024-12-10 RX ORDER — FUROSEMIDE 10 MG/ML
20 INJECTION INTRAMUSCULAR; INTRAVENOUS ONCE
Status: COMPLETED | OUTPATIENT
Start: 2024-12-10 | End: 2024-12-10

## 2024-12-10 RX ORDER — HYDROMORPHONE HCL IN WATER/PF 6 MG/30 ML
0.2 PATIENT CONTROLLED ANALGESIA SYRINGE INTRAVENOUS ONCE
Status: COMPLETED | OUTPATIENT
Start: 2024-12-10 | End: 2024-12-10

## 2024-12-10 RX ORDER — NOREPINEPHRINE BITARTRATE 0.02 MG/ML
.01-.6 INJECTION, SOLUTION INTRAVENOUS CONTINUOUS
Status: DISCONTINUED | OUTPATIENT
Start: 2024-12-10 | End: 2024-12-10

## 2024-12-10 RX ORDER — PIPERACILLIN SODIUM, TAZOBACTAM SODIUM 4; .5 G/20ML; G/20ML
4.5 INJECTION, POWDER, LYOPHILIZED, FOR SOLUTION INTRAVENOUS EVERY 6 HOURS
Status: DISCONTINUED | OUTPATIENT
Start: 2024-12-10 | End: 2024-12-11

## 2024-12-10 RX ORDER — FUROSEMIDE 10 MG/ML
20 INJECTION INTRAMUSCULAR; INTRAVENOUS ONCE
Status: DISCONTINUED | OUTPATIENT
Start: 2024-12-10 | End: 2024-12-10

## 2024-12-10 RX ORDER — CALCIUM CHLORIDE 100 MG/ML
1 INJECTION INTRAVENOUS; INTRAVENTRICULAR ONCE
Status: COMPLETED | OUTPATIENT
Start: 2024-12-10 | End: 2024-12-10

## 2024-12-10 RX ORDER — POTASSIUM CHLORIDE 29.8 MG/ML
20 INJECTION INTRAVENOUS ONCE
Status: COMPLETED | OUTPATIENT
Start: 2024-12-10 | End: 2024-12-10

## 2024-12-10 RX ORDER — AMLODIPINE BESYLATE 5 MG/1
5 TABLET ORAL DAILY
Status: ON HOLD | COMMUNITY
End: 2024-12-19

## 2024-12-10 RX ORDER — DEXTROSE MONOHYDRATE 25 G/50ML
50 INJECTION, SOLUTION INTRAVENOUS ONCE
Status: COMPLETED | OUTPATIENT
Start: 2024-12-10 | End: 2024-12-10

## 2024-12-10 RX ORDER — FUROSEMIDE 10 MG/ML
60 INJECTION INTRAMUSCULAR; INTRAVENOUS ONCE
Status: COMPLETED | OUTPATIENT
Start: 2024-12-10 | End: 2024-12-10

## 2024-12-10 RX ORDER — IPRATROPIUM BROMIDE AND ALBUTEROL SULFATE 2.5; .5 MG/3ML; MG/3ML
SOLUTION RESPIRATORY (INHALATION)
Status: COMPLETED
Start: 2024-12-10 | End: 2024-12-10

## 2024-12-10 RX ORDER — NOREPINEPHRINE BITARTRATE 0.02 MG/ML
INJECTION, SOLUTION INTRAVENOUS
Status: COMPLETED
Start: 2024-12-10 | End: 2024-12-10

## 2024-12-10 RX ORDER — ONDANSETRON 2 MG/ML
4 INJECTION INTRAMUSCULAR; INTRAVENOUS EVERY 6 HOURS PRN
Status: DISCONTINUED | OUTPATIENT
Start: 2024-12-10 | End: 2024-12-10

## 2024-12-10 RX ORDER — AMLODIPINE BESYLATE 2.5 MG/1
2.5 TABLET ORAL DAILY
Status: ON HOLD | COMMUNITY
End: 2024-12-19

## 2024-12-10 RX ORDER — NALOXONE HYDROCHLORIDE 0.4 MG/ML
0.1 INJECTION, SOLUTION INTRAMUSCULAR; INTRAVENOUS; SUBCUTANEOUS
Status: DISCONTINUED | OUTPATIENT
Start: 2024-12-10 | End: 2024-12-13

## 2024-12-10 RX ORDER — HYDROMORPHONE HYDROCHLORIDE 1 MG/ML
0.5 INJECTION, SOLUTION INTRAMUSCULAR; INTRAVENOUS; SUBCUTANEOUS
Status: DISCONTINUED | OUTPATIENT
Start: 2024-12-10 | End: 2024-12-13

## 2024-12-10 RX ORDER — FUROSEMIDE 10 MG/ML
INJECTION INTRAMUSCULAR; INTRAVENOUS
Status: DISCONTINUED
Start: 2024-12-10 | End: 2024-12-11 | Stop reason: HOSPADM

## 2024-12-10 RX ORDER — METOPROLOL SUCCINATE 50 MG/1
50 TABLET, EXTENDED RELEASE ORAL DAILY
Status: ON HOLD | COMMUNITY
End: 2024-12-19

## 2024-12-10 RX ORDER — BISACODYL 10 MG
10 SUPPOSITORY, RECTAL RECTAL
Status: DISCONTINUED | OUTPATIENT
Start: 2024-12-13 | End: 2024-12-16

## 2024-12-10 RX ORDER — HALOPERIDOL 5 MG/ML
2 INJECTION INTRAMUSCULAR ONCE
Status: COMPLETED | OUTPATIENT
Start: 2024-12-10 | End: 2024-12-10

## 2024-12-10 RX ORDER — CARBOXYMETHYLCELLULOSE SODIUM 5 MG/ML
1-2 SOLUTION/ DROPS OPHTHALMIC
Status: DISCONTINUED | OUTPATIENT
Start: 2024-12-10 | End: 2024-12-16

## 2024-12-10 RX ORDER — ALBUTEROL SULFATE 90 UG/1
2 INHALANT RESPIRATORY (INHALATION) EVERY 4 HOURS PRN
Status: DISCONTINUED | OUTPATIENT
Start: 2024-12-10 | End: 2024-12-10

## 2024-12-10 RX ORDER — MAGNESIUM SULFATE HEPTAHYDRATE 40 MG/ML
4 INJECTION, SOLUTION INTRAVENOUS ONCE
Status: COMPLETED | OUTPATIENT
Start: 2024-12-10 | End: 2024-12-10

## 2024-12-10 RX ORDER — DEXTROSE MONOHYDRATE 25 G/50ML
25-50 INJECTION, SOLUTION INTRAVENOUS
Status: DISCONTINUED | OUTPATIENT
Start: 2024-12-10 | End: 2024-12-10

## 2024-12-10 RX ORDER — CALCIUM GLUCONATE 20 MG/ML
1 INJECTION, SOLUTION INTRAVENOUS ONCE
Status: COMPLETED | OUTPATIENT
Start: 2024-12-10 | End: 2024-12-10

## 2024-12-10 RX ORDER — ONDANSETRON 2 MG/ML
INJECTION INTRAMUSCULAR; INTRAVENOUS
Status: COMPLETED
Start: 2024-12-10 | End: 2024-12-10

## 2024-12-10 RX ORDER — HALOPERIDOL 5 MG/ML
2 INJECTION INTRAMUSCULAR EVERY 6 HOURS PRN
Status: DISCONTINUED | OUTPATIENT
Start: 2024-12-10 | End: 2024-12-13

## 2024-12-10 RX ORDER — FUROSEMIDE 10 MG/ML
INJECTION INTRAMUSCULAR; INTRAVENOUS
Status: DISCONTINUED
Start: 2024-12-10 | End: 2024-12-10

## 2024-12-10 RX ORDER — ASPIRIN 81 MG/1
81 TABLET ORAL DAILY
Status: ON HOLD | COMMUNITY
End: 2024-12-19

## 2024-12-10 RX ORDER — LORAZEPAM 0.5 MG/1
0.5 TABLET ORAL
Status: DISCONTINUED | OUTPATIENT
Start: 2024-12-10 | End: 2024-12-13

## 2024-12-10 RX ORDER — SODIUM CHLORIDE, SODIUM LACTATE, POTASSIUM CHLORIDE, CALCIUM CHLORIDE 600; 310; 30; 20 MG/100ML; MG/100ML; MG/100ML; MG/100ML
INJECTION, SOLUTION INTRAVENOUS CONTINUOUS PRN
Status: DISCONTINUED | OUTPATIENT
Start: 2024-12-10 | End: 2024-12-10

## 2024-12-10 RX ADMIN — SODIUM CHLORIDE, POTASSIUM CHLORIDE, SODIUM LACTATE AND CALCIUM CHLORIDE: 600; 310; 30; 20 INJECTION, SOLUTION INTRAVENOUS at 13:57

## 2024-12-10 RX ADMIN — PIPERACILLIN AND TAZOBACTAM 4.5 G: 4; .5 INJECTION, POWDER, FOR SOLUTION INTRAVENOUS at 13:21

## 2024-12-10 RX ADMIN — PANTOPRAZOLE SODIUM 80 MG: 40 INJECTION, POWDER, FOR SOLUTION INTRAVENOUS at 05:30

## 2024-12-10 RX ADMIN — NOREPINEPHRINE BITARTRATE 0.03 MCG/KG/MIN: 0.02 INJECTION, SOLUTION INTRAVENOUS at 05:21

## 2024-12-10 RX ADMIN — SODIUM PHOSPHATE, MONOBASIC, MONOHYDRATE AND SODIUM PHOSPHATE, DIBASIC, ANHYDROUS 15 MMOL: 142; 276 INJECTION, SOLUTION INTRAVENOUS at 10:03

## 2024-12-10 RX ADMIN — DEXTROSE MONOHYDRATE 50 ML: 25 INJECTION, SOLUTION INTRAVENOUS at 05:13

## 2024-12-10 RX ADMIN — NOREPINEPHRINE BITARTRATE 0.06 MCG/KG/MIN: 0.02 INJECTION, SOLUTION INTRAVENOUS at 16:25

## 2024-12-10 RX ADMIN — SODIUM CHLORIDE 8 MG/HR: 9 INJECTION, SOLUTION INTRAVENOUS at 05:53

## 2024-12-10 RX ADMIN — ONDANSETRON 4 MG: 2 INJECTION INTRAMUSCULAR; INTRAVENOUS at 04:50

## 2024-12-10 RX ADMIN — SODIUM CHLORIDE 1000 ML: 9 INJECTION, SOLUTION INTRAVENOUS at 05:18

## 2024-12-10 RX ADMIN — HALOPERIDOL LACTATE 2 MG: 5 INJECTION, SOLUTION INTRAMUSCULAR at 20:50

## 2024-12-10 RX ADMIN — SODIUM CHLORIDE 8 MG/HR: 9 INJECTION, SOLUTION INTRAVENOUS at 13:23

## 2024-12-10 RX ADMIN — PIPERACILLIN AND TAZOBACTAM 4.5 G: 4; .5 INJECTION, POWDER, FOR SOLUTION INTRAVENOUS at 17:47

## 2024-12-10 RX ADMIN — FUROSEMIDE 20 MG: 10 INJECTION, SOLUTION INTRAMUSCULAR; INTRAVENOUS at 19:55

## 2024-12-10 RX ADMIN — MAGNESIUM SULFATE IN WATER FOR 4 G: 40 INJECTION INTRAVENOUS at 12:04

## 2024-12-10 RX ADMIN — SODIUM CHLORIDE 8 MG/HR: 9 INJECTION, SOLUTION INTRAVENOUS at 22:38

## 2024-12-10 RX ADMIN — PIPERACILLIN AND TAZOBACTAM 4.5 G: 4; .5 INJECTION, POWDER, FOR SOLUTION INTRAVENOUS at 05:25

## 2024-12-10 RX ADMIN — IPRATROPIUM BROMIDE AND ALBUTEROL SULFATE 3 ML: .5; 3 SOLUTION RESPIRATORY (INHALATION) at 20:27

## 2024-12-10 RX ADMIN — POTASSIUM CHLORIDE 20 MEQ: 29.8 INJECTION, SOLUTION INTRAVENOUS at 07:12

## 2024-12-10 RX ADMIN — CALCIUM GLUCONATE 1 G: 20 INJECTION, SOLUTION INTRAVENOUS at 08:38

## 2024-12-10 RX ADMIN — FUROSEMIDE 60 MG: 10 INJECTION, SOLUTION INTRAMUSCULAR; INTRAVENOUS at 20:14

## 2024-12-10 RX ADMIN — CALCIUM CHLORIDE 1 G: 100 INJECTION INTRAVENOUS; INTRAVENTRICULAR at 06:47

## 2024-12-10 RX ADMIN — VANCOMYCIN HYDROCHLORIDE 1750 MG: 5 INJECTION, POWDER, LYOPHILIZED, FOR SOLUTION INTRAVENOUS at 06:09

## 2024-12-10 ASSESSMENT — ACTIVITIES OF DAILY LIVING (ADL)
ADLS_ACUITY_SCORE: 52
ADLS_ACUITY_SCORE: 53
ADLS_ACUITY_SCORE: 52
ADLS_ACUITY_SCORE: 53
ADLS_ACUITY_SCORE: 53
ADLS_ACUITY_SCORE: 52
ADLS_ACUITY_SCORE: 53
ADLS_ACUITY_SCORE: 52
ADLS_ACUITY_SCORE: 47
ADLS_ACUITY_SCORE: 53
ADLS_ACUITY_SCORE: 47

## 2024-12-10 NOTE — PROVIDER NOTIFICATION
12/10/24 0832   Critical Test Results/Notification   Critical Lab Result (Lab Name and Value) lactic 7.1   What Time Did The Lab Notify You? 0832   Provider Notified yes   Date of Provider Notification 12/10/24   Time of Provider Notification 0832   Mechanism of Provider notification verbal (face-to-face)     MD Eduardo

## 2024-12-10 NOTE — PLAN OF CARE
RN [id number]    Goal Outcome Evaluation:         Overall Patient Progress: improvingOverall Patient Progress: improving    Hgb Stable 11.0. no further signs of bleeding. Tele; NSR, on lose dose of levo. PPI gtt continues. Incision to right HIP WDL. Turned and repositioned Q 2 hours, tolerated ICE chips. Denies nausea or vomiting. 3 black formed stools, internal jugular and PIV intact. Urinary cath in place with low UP, MD notified, LR started at 75/hr and then increased to 125cc/hr.        ICU End of Shift Summary.  For vital signs and complete assessments, please see documentation flowsheets.      Pertinent assessments:   Neuro: Alert to seld, confused  Cardiac:tele: NSR, BP satbel on levo  Resp: on Oxymask 5L. LS: coarse/ crackles  GI: +BS  : Shen in place  Skin: right hip incision CDI. Bruise to right hip  Lines: right internal jugular, 2 PIV  Drips: Levo, protonix    Major Shift Events:   Admitted this am fro GI bleed  MTP restarted on the floor per order and then stopped per MD [place]  2 units of PRBCC given in ICU before it  was stopped  Labs sent per orders  Mag and phosp replaced per protocol  Tolerated Bipap on and off versus NC and Oxymask   3 medium black formed stool  Levo titrated per orders  1230 patient sounded very wet. MD paged requesting IV lasix  Order of Lasix placed per RN request and Then discontinued  PPI continues  IVF started due to low UOP  Bipap reapplied  Patient got agitated and pulled his Bipap off. Refusing Oxymask, tolerating NC    Plan (Upcoming Events): continue to trend hgb, monitor electrolytes, replace as needed. Continue IV abx  Discharge/Transfer Needs: TDB     Bedside Shift Report Completed : yes  Bedside Safety Check Completed: yes     Addendum: 1945 patient sats dropped to low 60, Bipap 100% applied 14/10. Stat lasix and CXR ordered and completed. MD cartwright at bedside performed Chest/ US  Another 60mg of IV Lasix given, along with neb. Stat VBG sent. Brother called and  Updated. IVF stopped.

## 2024-12-10 NOTE — PHARMACY-ADMISSION MEDICATION HISTORY
Pharmacist Admission Medication History    Admission medication history is complete. The information provided in this note is only as accurate as the sources available at the time of the update.    Information Source(s): Patient's brother via in-person    Pertinent Information:      Changes made to PTA medication list:  Added: fish oil supplement, metoprolol-XL  Deleted: albuterol inh  Changed: aspirin, amlodipine, norvasc    Allergies reviewed with patient and updates made in EHR: yes    Medication History Completed By: Archana Bell [facility] 12/10/2024 3:00 PM    PTA Med List   Medication Sig Last Dose/Taking    amLODIPine (NORVASC) 2.5 MG tablet Take 2.5 mg by mouth daily. Give with 5mg to = 7.5mg today 12/9/2024 Morning    amLODIPine (NORVASC) 5 MG tablet Take 5 mg by mouth daily. Give with 2.5mg = 7.5mg today 12/9/2024 Morning    aspirin 81 MG EC tablet Take 81 mg by mouth daily. 12/9/2024 Morning    atorvastatin (LIPITOR) 40 MG tablet Take 1 tablet (40 mg) by mouth every evening 12/9/2024 Evening    fish oil-omega-3 fatty acids 1000 MG capsule Take 1 g by mouth daily. 12/9/2024 Morning    metoprolol succinate ER (TOPROL XL) 50 MG 24 hr tablet Take 50 mg by mouth daily. 12/9/2024 Morning

## 2024-12-10 NOTE — ED PROVIDER NOTES
Emergency Department Note      History of Present Illness     Chief Complaint   Blood Infection      HPI   Jose Vazquez is a 79 year old male with a history of COPD and recent right hip surgery who presents from [facility] to the ED via EMS for hypoxia. EMS reports that the patient is a recent discharge to TCU after a right femur fracture s/p repair. Staff members noticed that the patient was hypoxic at 70% oxygen saturation while on 4 L nasal canula. Upon EMS arrival, the patient was found to be tachycardic with a heart rate in the 120s. He was noted to have coffee ground emesis on a towel. EMS placed the patient on a non-rebreather with 15 L of oxygen which brought up his oxygen saturation to 80%. The patient arrives to the ED nauseous. On lovenox 40 units daily for DVT prophylaxis. The patient is DNR DNI.    Independent Historian   EMS as detailed above.    Review of External Notes   Reviewed patient's nursing home paperwork, patient is receiving Lovenox injection, 40 units between 7 AM and 10 AM each day.  His last dose was on 12/9/2024.    Past Medical History     Medical History and Problem List   Nephrolithiasis   Pulmonary nodule  Hypertension  Emphysema  Stroke     Medications   Enoxaparin  Amlodipine  Atorvastatin  Metoprolol  Aspirin  Miralax     Surgical History   Intertrochanteric hip fracture surgery    Physical Exam     Patient Vitals for the past 24 hrs:   BP Temp Temp src Pulse Resp SpO2 Height Weight   12/10/24 0710 104/76 98.4  F (36.9  C) -- 88 14 95 % -- --   12/10/24 0706 -- 98.4  F (36.9  C) -- 92 16 95 % -- --   12/10/24 0705 106/78 98.4  F (36.9  C) -- 91 26 95 % -- --   12/10/24 0700 92/66 98.4  F (36.9  C) -- 86 15 95 % -- --   12/10/24 0655 91/67 98.2  F (36.8  C) -- 94 20 93 % -- --   12/10/24 0650 (!) 88/65 98.2  F (36.8  C) -- 93 28 94 % -- --   12/10/24 0644 (!) 71/54 98.1  F (36.7  C) -- 93 17 (!) 88 % -- --   12/10/24 0635 (!) 65/48 97.7  F (36.5  C) -- 95 10 (!) 89 %  -- --   12/10/24 0630 (!) 79/52 97.5  F (36.4  C) -- 98 26 (!) 89 % -- --   12/10/24 0530 100/66 -- -- 97 28 98 % -- --   12/10/24 0528 -- 97.6  F (36.4  C) Temporal -- -- -- -- --   12/10/24 0525 109/70 -- -- 97 25 97 % -- --   12/10/24 0520 (!) 84/60 -- -- 90 24 (!) 84 % -- --   12/10/24 0516 -- -- -- -- -- -- 1.829 m (6') 73.2 kg (161 lb 6 oz)   12/10/24 0515 (!) 72/57 -- -- 113 (!) 42 (!) 88 % -- --   12/10/24 0446 (!) 110/95 -- -- (!) 125 (!) 44 (!) 80 % -- --     Physical Exam  General: Awake, answering questions.  Eyes: Pupils equal, conjunctivae pale  ENT:  Dry mucus membranes. Coffee ground emesis around mouth.   Respiratory:  Lungs clear to auscultation bilaterally, no crackles/rubs/wheezes.  Good air movement  CV: Normal rhythm, no murmurs.  Tachycardia.  GI:  Abdomen soft and non-distended.  No tenderness, guarding or rebound  Skin: Pale, diaphoretic, cool.  Healing surgical incisions on the right hip with surrounding bruising.  No redness, swelling.  Musculoskeletal: No peripheral edema.  Bilateral pedal pulses intact with slow refill bilaterally.  Neuro: Alert and oriented to person/place/time.    Diagnostics     Lab Results   Labs Ordered and Resulted from Time of ED Arrival to Time of ED Departure   COMPREHENSIVE METABOLIC PANEL - Abnormal       Result Value    Sodium 148 (*)     Potassium 1.8 (*)     Carbon Dioxide (CO2) 9 (*)     Anion Gap 15      Urea Nitrogen 16.3      Creatinine 0.73      GFR Estimate >90      Calcium 3.5 (*)     Chloride 124 (*)     Glucose 211 (*)     Alkaline Phosphatase 81      AST 25      ALT 13      Protein Total 2.3 (*)     Albumin 1.3 (*)     Bilirubin Total 0.4     LIPASE - Abnormal    Lipase 7 (*)    TROPONIN T, HIGH SENSITIVITY - Abnormal    Troponin T, High Sensitivity 26 (*)    GLUCOSE BY METER - Abnormal    GLUCOSE BY METER POCT 62 (*)    ROUTINE UA WITH MICROSCOPIC REFLEX TO CULTURE - Abnormal    Color Urine Yellow      Appearance Urine Slightly Cloudy (*)      Glucose Urine Negative      Bilirubin Urine Negative      Ketones Urine 10 (*)     Specific Gravity Urine 1.023      Blood Urine Small (*)     pH Urine 6.0      Protein Albumin Urine 20 (*)     Urobilinogen Urine Normal      Nitrite Urine Positive (*)     Leukocyte Esterase Urine Large (*)     Bacteria Urine Few (*)     Mucus Urine Present (*)     RBC Urine 40 (*)     WBC Urine 111 (*)     Squamous Epithelials Urine <1     CBC WITH PLATELETS AND DIFFERENTIAL - Abnormal    WBC Count 19.2 (*)     RBC Count 1.35 (*)     Hemoglobin 3.8 (*)     Hematocrit 13.3 (*)     MCV 99      MCH 28.1      MCHC 28.6 (*)     RDW 14.0      Platelet Count 99 (*)    OCCULT BLOOD STOOL - Abnormal    Occult Blood Positive (*)    ISTAT GASES LACTATE VENOUS POCT - Abnormal    Lactic Acid POCT 9.2 (*)     Bicarbonate Venous POCT 22      O2 Sat, Venous POCT 46 (*)     pCO2 Venous POCT 41      pH Venous POCT 7.34      pO2 Venous POCT 27     MANUAL DIFFERENTIAL - Abnormal    % Neutrophils 99      % Lymphocytes 1      % Monocytes 0      % Eosinophils 0      % Basophils 0      Absolute Neutrophils 19.0 (*)     Absolute Lymphocytes 0.2 (*)     Absolute Monocytes 0.0      Absolute Eosinophils 0.0      Absolute Basophils 0.0      RBC Morphology Confirmed RBC Indices      Platelet Assessment        Value: Automated Count Confirmed. Platelet morphology is normal.    [place] Cells Slight (*)     Polychromasia Slight (*)     Smudge Cells Present (*)    D DIMER QUANTITATIVE - Abnormal    D-Dimer Quantitative >20.00 (*)    FIBRINOGEN ACTIVITY - Normal    Fibrinogen Activity 184     TROPONIN T, HIGH SENSITIVITY   LACTIC ACID WHOLE BLOOD   PREPARE RED BLOOD CELLS (UNIT)    ISSUE DATE AND TIME [id number]      Blood Component Type Red Blood Cells      Product Code U5301Y76      Unit Status Issued      Unit Number [id number]      UNIT ABO/RH O-      CODING SYSTEM [id number]      UNIT TYPE ISBT 9500     PREPARE RED BLOOD CELLS (UNIT)    ISSUE DATE AND TIME  20241210051200      Blood Component Type Red Blood Cells      Product Code I1073I39      Unit Status Issued      Unit Number [id number]      UNIT ABO/RH O-      CODING SYSTEM [id number]      UNIT TYPE ISBT 9500     PREPARE RED BLOOD CELLS (UNIT)    ISSUE DATE AND TIME 20241210051200      Blood Component Type Red Blood Cells      Product Code B5173H72      Unit Status Issued      Unit Number [id number]      UNIT ABO/RH O-      CODING SYSTEM WADC009      UNIT TYPE ISBT 9500     PREPARE RED BLOOD CELLS (UNIT)    ISSUE DATE AND TIME 20241210051200      Blood Component Type Red Blood Cells      Product Code R0217Y21      Unit Status Issued      Unit Number [id number]      UNIT ABO/RH O-      CODING SYSTEM [id number]      UNIT TYPE ISBT 9500     TYPE AND SCREEN, ADULT    ABO/RH(D) O POS      Antibody Screen Negative      SPECIMEN EXPIRATION DATE 20241213235900     PREPARE PLASMA (UNIT)    ISSUE DATE AND TIME 20241210052500      Blood Component Type Plasma      Product Code L0144C15      Unit Status Issued      Unit Number [id number]      UNIT ABO/RH A+      CODING SYSTEM [id number]      UNIT TYPE ISBT 6200     PREPARE PLASMA (UNIT)    ISSUE DATE AND TIME 20241210052500      Blood Component Type Plasma      Product Code E5902F91      Unit Status Issued      Unit Number [id number]      UNIT ABO/RH A+      CODING SYSTEM WWHY886      UNIT TYPE ISBT 6200     PREPARE PHERESED PLATELETS (UNIT)    ISSUE DATE AND TIME 20241210053000      Blood Component Type Platelets      Product Code Z2683J70      Unit Status Issued      Unit Number [id number]      UNIT ABO/RH A+      CODING SYSTEM XWYQ295      UNIT TYPE ISBT 6200     PREPARE RED BLOOD CELLS (UNIT)    Blood Component Type Red Blood Cells      Product Code R1079I57      Unit Status Ready for issue      Unit Number [id number]      CROSSMATCH Compatible      CODING SYSTEM XAFC570     PREPARE RED BLOOD CELLS (UNIT)    Blood Component Type Red Blood Cells      Product  Code A9212S76      Unit Status Ready for issue      Unit Number [id number]      CROSSMATCH Compatible      CODING SYSTEM [id number]     PREPARE RED BLOOD CELLS (UNIT)    Blood Component Type Red Blood Cells      Product Code S8064X24      Unit Status Ready for issue      Unit Number [id number]      CROSSMATCH Compatible      CODING SYSTEM VAET405     PREPARE RED BLOOD CELLS (UNIT)    Blood Component Type Red Blood Cells      Product Code B4189H81      Unit Status Ready for issue      Unit Number [id number]      CROSSMATCH Compatible      CODING SYSTEM VXQR627     PREPARE PLASMA (UNIT)    Blood Component Type Plasma      Product Code O5319A11      Unit Status Ready for issue      Unit Number [id number]      CODING SYSTEM YBYG904     PREPARE PLASMA (UNIT)    Blood Component Type Plasma      Product Code R9536A88      Unit Status Ready for issue      Unit Number [id number]      CODING SYSTEM YQZS616     PREPARE PLASMA (UNIT)    Blood Component Type Plasma      Product Code X4698R61      Unit Status Ready for issue      Unit Number [id number]      CODING SYSTEM QLTM559     PREPARE PLASMA (UNIT)    Blood Component Type Plasma      Product Code H2107J73      Unit Status Ready for issue      Unit Number [id number]      CODING SYSTEM EHUC518     BLOOD CULTURE   URINE CULTURE   ABO/RH TYPE AND SCREEN       Imaging   XR Chest Port 1 View   Final Result   IMPRESSION: Left IJ central venous catheter terminates in the SVC above the superior cavoatrial junction. A small nodular opacity in the left lung base is not seen on prior same day chest x-ray, likely a nipple shadow. Heart size and pulmonary    vascularity are normal. No confluent airspace opacity or pneumothorax. Mild nonspecific elevation of the right hemidiaphragm redemonstrated.      XR Chest Port 1 View   Final Result   IMPRESSION: Heart size is normal. Mild pulmonary vascular congestion is present. No confluent airspace opacity, pleural effusion or  pneumothorax. Mild nonspecific elevation of the right hemidiaphragm.      CT Chest/Abdomen/Pelvis w Contrast    (Results Pending)       EKG   ECG taken at 0500, ECG read at 0500  Sinus tachycardia   Rate 118 bpm. VA interval 132 ms. QRS duration 78 ms. QT/QTc 336/470 ms. P-R-T axes 78 68 74.    Independent Interpretation   Chest x-ray does not show any sign of consolidation.  Chest x-ray post central line does not show any pneumothorax.      ED Course      Medications Administered   Medications   pantoprazole (PROTONIX) IV push injection 80 mg (80 mg Intravenous $Given 12/10/24 0530)     Followed by   pantoprazole (PROTONIX) 80 mg in sodium chloride 0.9% 100 mL infusion (8 mg/hr Intravenous $New Bag 12/10/24 0553)   vancomycin (VANCOCIN) 1,750 mg in 0.9% NaCl 517.5 mL intermittent infusion (1,750 mg Intravenous $New Bag 12/10/24 0609)   norepinephrine (LEVOPHED) 4 mg in  mL infusion PREMIX (0.06 mcg/kg/min × 73.2 kg Intravenous Rate/Dose Change 12/10/24 0648)   vancomycin (VANCOCIN) 1,000 mg in 200 mL dextrose intermittent infusion (has no administration in time range)   ondansetron (ZOFRAN) 2 MG/ML injection (4 mg  $Given 12/10/24 0450)   piperacillin-tazobactam (ZOSYN) 4.5 g vial to attach to  mL bag (0 g Intravenous Stopped 12/10/24 0629)   dextrose 50 % injection 50 mL (50 mLs Intravenous $Given 12/10/24 0513)   sodium chloride 0.9% BOLUS 1,000 mL (0 mLs Intravenous Stopped 12/10/24 0532)   potassium chloride 20 mEq in 50 mL intermittent infusion (20 mEq Intravenous $New Bag 12/10/24 0712)   calcium chloride injection 1 g (1 g Intravenous $Given 12/10/24 0647)       Procedures   Procedures     Central Line Placement     Procedure:  Central Venous Catheter Insertion     Indication: Vasopressor administration and Vascular access    Consent:  Unable/Emergent  Risk Discussed: Unable/emergent    Universal Protocol: Universal protocol was followed and time out conducted just prior to starting procedure,  confirming patient identity, site/side, procedure, patient position, and availability of correct equipment and implants.     Anesthesia/Sedation: Lidocaine - 1%    Procedure Detail:    Central line bundle elements were complete including preparatory hand leansing, donning full barrier precautions, use of a full body drape and chlorhxidine gluconate skin prep.   The Left internal jugular vein was selected as the optimal location for patient s condition.   Ultrasound was utilized for guidance: Yes   A finder needle was used to enter the vein, through which a guidewire was inserted. The finder needle was then removed and the tract was dilated.   A triple lumen central venous catheter was inserted over the guidewire without difficulty. The guidewire was removed and the catheter was sutured in place and covered with an appropriate dressing.   A post-procedure chest radiograph was performed.     Patient Status:  The patient tolerated the procedure well: Yes. There were no complications.     Discussion of Management   Spoke with Dr. Arreguin with GI. Will come see patient and scope the patient as soon as he is in the ICU. Patient needs to be resuscitated prior to endoscopic.    ED Course   ED Course as of 12/10/24 0738   Tue Dec 10, 2024   0442 I obtained history and performed physical exam as noted above.    0447 Place on Bipap   0448 Patient confirmed DNR DNI       Additional Documentation  None    Medical Decision Making / Diagnosis     CMS Diagnoses: IV Antibiotics given and/or elevated Lactate of 9.2 and no sepsis note found - Delete this reminder and enter the sepsis note or '.edcms' before signing chart.>>>The patient has signs of sepsis   Sepsis ED evaluation   The patient has signs of sepsis as evidenced by:  1. Presence of 2 SIRS criteria, suspected infection, AND  2. Organ dysfunction: Initial hypotension due to sepsis, Lactic Acidosis with value >2.0 due to sepsis, and Acute respiratory or ventilatory failure  "with new need for positive pressure ventilation due to sepsis    Time zero:  0509  on 12/10/24 as this was the time when Lactate was resulted and the level was greater than 2, Persistent Hypotension, and Provider determined that sepsis was present.    Note: Due to a national blood culture bottle shortage, reduced blood cultures may have been drawn on this patient.    Lactic Acid Results:  Recent Labs   Lab Test 12/10/24  0509   LACT 9.2*       3 Hour Bundle 6 Hour Bundle (Reassessment)   Blood Cultures before IV Antibiotics: Yes  Antibiotics given: see below  Prehospital fluid volume (mL):                     Total fluids given (ED +Pre-hospital):  Full 30 mL/kg bolus intentionally NOT administered to this patient due GI bleed, patient needed blood not normal saline. He was given 1L NS prior to MTP.   Repeat Lactic Acid Level: Ordered by reflex for 2 hours after initial lactic acid collection.  Vasopressors: Vasopressors started for septic shock due to persistent hypotension.  Repeat perfusion exam: I attest to having performed a repeat sepsis exam and assessment of perfusion at  0700 .   BMI Readings from Last 1 Encounters:   12/10/24 21.89 kg/m        Anti-infectives (From admission through now)      Start     Dose/Rate Route Frequency Ordered Stop    12/10/24 0540  vancomycin (VANCOCIN) 1,750 mg in 0.9% NaCl 517.5 mL intermittent infusion         1,750 mg  over 2 Hours Intravenous ONCE 12/10/24 0537      12/10/24 0505  piperacillin-tazobactam (ZOSYN) 4.5 g vial to attach to  mL bag        Note to Pharmacy: For [facility], [facility] and [facility]: For Zosyn-naive patients, use the \"Zosyn initial dose + extended infusion\" order panel.    4.5 g  over 30 Minutes Intravenous ONCE 12/10/24 0503 12/10/24 0629                MIPS   CT for PE was ordered because the patient is high risk for pulmonary embolism.    ELLEN   Jose SNEED [place]e is a 79 year old male with a history of COPD and recent hip surgery on prophylactic Lovenox presents to " the emergency department with a complaint of hypoxia, hypotension.  Patient is at a TCU and was discharged from the hospital after right hip surgery yesterday.  This morning the TCU noticed his vital signs were abnormal and called EMS.  When EMS arrived the patient was hypoxic at 80%.  He was on a 15 L nonrebreather on arrival to the emergency department with an oxygen saturation of 84%.  Patient is placed on BiPAP.  Patient was awake, alert, answering questions.  He did confirm that he is a DNR and DNI.  He would like us to call his brother.  His abdomen is soft, but I can see coffee-ground emesis around his mouth.  He is tachycardic.  Initial blood pressure is normotensive, but he quickly drops down, is quickly slowing down and I am concerned for cardiac arrest.  MTP is called. 2 IVs are established.  Lactic acid is 9.2.  Vanco and Zosyn are ordered.  Blood sugar is 62 and the patient is given D50.  Concern for GI bleed.  There is also concern for sepsis, PE, aspiration pneumonia, DIC.  I did speak with pharmacy and as the patient had his last Lovenox injection over 12 hours ago he does not need to be reversed.  Patient is also given Lovenox.  Levophed is initiated in his peripheral IV.  Central line is placed.  Blood is started.  Lab called with critical hemoglobin of 3.5.  I did speak with Dr. Arreguin with GI, recommends resuscitation, and will see the patient once he gets to the ICU for a scope.  I did speak with Dr. Devi for admission.  At this point I have lower suspicion for PE.  Chest x-ray does not show any consolidations, does show possible fluid overload.  Will switch CT imaging from PE study to the chest abdomen pelvis for GI bleed.  They did speak with Dr. Devi regarding this and she agrees.  I will keep the abdomen and pelvis as I am still concerned for intra-abdominal infections.  CT scan is still pending.  Lab work is still resulting, his urine shows signs of infection.  His potassium is 1.8  and his calcium is 3.5, I did order replacements and let Dr. Devi know those results.  On reevaluation of the patient, he states that he is feeling much better.  His skin color is much better, his vital signs are looking better.  Patient will be transferred to the ICU.  His brother does come to the emergency department and we did speak about his brother's condition.  He does report that he saw him yesterday and he was doing really well.  Patient is transferred to the ICU.    Critical Care time was 45 minutes for this patient excluding procedures.     Disposition   The patient was admitted to the hospital.     Diagnosis     ICD-10-CM    1. Lower GI bleed  K92.2       2. Acute hypoxemic respiratory failure (H)  J96.01       3. Acute cystitis with hematuria  N30.01       4. Septic shock (H)  A41.9     R65.21       5. Hypokalemia  E87.6       6. Hypocalcemia  E83.51            Discharge Medications   New Prescriptions    No medications on file         Scribe Disclosure:  Marcos SWANSON, am serving as a scribe at 5:05 AM on 12/10/2024 to document services personally performed by Christine Toledo MD based on my observations and the provider's statements to me.        Christine Toledo MD  12/10/24 8231

## 2024-12-10 NOTE — ED NOTES
Patient arrived on 15 L oxymask with oxygen saturations in 80's. Patient did have an episode of coffee ground colored emesis prior to arrival- risks discussed with RN and MD with decision to place BiPAP. Skin checked without issue noted.    Patient is on 12/6 100%. RR 48, SpO2 98% when able to read.    RT to continue to follow and wean as tolerated.

## 2024-12-10 NOTE — PROGRESS NOTES
Notified provider about indwelling bolden catheter discussed removal or continued need.    Did provider choose to remove indwelling bolden catheter? NO    Provider's bolden indication for keeping indwelling bolden catheter: Indication for continued use: Strict 1-2 Hour I & O if external catheters are not an option    Is there an order for indwelling bolden catheter? YES    *If there is a plan to keep bolden catheter in place at discharge daily notification with provider is not necessary, but please add a notation in the treatment team sticky note that the patient will be discharging with the catheter.

## 2024-12-10 NOTE — PROGRESS NOTES
A BiPAP of  12/6 @ 45% was applied to the pt via the mask for an increase in WOB and SOB.  The skin in contact with the mask and straps looks good and intact. Pt is tolerating it well. RT will continue to monitor and assess the pt's respiratory status and needs.  RT/RN huddle to discuss contraindications prior to placement? Yes
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
## 2024-12-10 NOTE — ED TRIAGE NOTES
BIBA from [facility] recent discharge  R femur fx. Pt tachy in 120s 70% 4 NC 80% 15 L NRB per EMS. EMS reports thick junky lungs, coffee ground emesis on towel next to pt resp. Rate in the 50s. Sweaty upon arrival of EMS. DNR DNI.

## 2024-12-11 VITALS
SYSTOLIC BLOOD PRESSURE: 99 MMHG | OXYGEN SATURATION: 76 % | DIASTOLIC BLOOD PRESSURE: 64 MMHG | BODY MASS INDEX: 21.53 KG/M2 | WEIGHT: 158.95 LBS | HEART RATE: 100 BPM | HEIGHT: 72 IN | TEMPERATURE: 97.5 F | RESPIRATION RATE: 22 BRPM

## 2024-12-11 LAB — BACTERIA UR CULT: ABNORMAL

## 2024-12-11 PROCEDURE — 250N000011 HC RX IP 250 OP 636: Performed by: INTERNAL MEDICINE

## 2024-12-11 PROCEDURE — 99231 SBSQ HOSP IP/OBS SF/LOW 25: CPT | Performed by: INTERNAL MEDICINE

## 2024-12-11 PROCEDURE — 200N000001 HC R&B ICU

## 2024-12-11 RX ADMIN — HYDROMORPHONE HYDROCHLORIDE 0.2 MG: 0.2 INJECTION, SOLUTION INTRAMUSCULAR; INTRAVENOUS; SUBCUTANEOUS at 06:54

## 2024-12-11 RX ADMIN — PIPERACILLIN AND TAZOBACTAM 4.5 G: 4; .5 INJECTION, POWDER, FOR SOLUTION INTRAVENOUS at 05:58

## 2024-12-11 RX ADMIN — HYDROMORPHONE HYDROCHLORIDE 0.2 MG: 0.2 INJECTION, SOLUTION INTRAMUSCULAR; INTRAVENOUS; SUBCUTANEOUS at 03:49

## 2024-12-11 RX ADMIN — PIPERACILLIN AND TAZOBACTAM 4.5 G: 4; .5 INJECTION, POWDER, FOR SOLUTION INTRAVENOUS at 00:50

## 2024-12-11 RX ADMIN — HYDROMORPHONE HYDROCHLORIDE 0.2 MG: 0.2 INJECTION, SOLUTION INTRAMUSCULAR; INTRAVENOUS; SUBCUTANEOUS at 00:48

## 2024-12-11 ASSESSMENT — ACTIVITIES OF DAILY LIVING (ADL)
ADLS_ACUITY_SCORE: 53
ADLS_ACUITY_SCORE: 53
ADLS_ACUITY_SCORE: 57
ADLS_ACUITY_SCORE: 57
ADLS_ACUITY_SCORE: 55
ADLS_ACUITY_SCORE: 53
ADLS_ACUITY_SCORE: 57
ADLS_ACUITY_SCORE: 53
ADLS_ACUITY_SCORE: 57
ADLS_ACUITY_SCORE: 53
ADLS_ACUITY_SCORE: 57
ADLS_ACUITY_SCORE: 57
ADLS_ACUITY_SCORE: 53
ADLS_ACUITY_SCORE: 57
ADLS_ACUITY_SCORE: 53
ADLS_ACUITY_SCORE: 53
ADLS_ACUITY_SCORE: 57
ADLS_ACUITY_SCORE: 53
ADLS_ACUITY_SCORE: 57
ADLS_ACUITY_SCORE: 53
ADLS_ACUITY_SCORE: 57

## 2024-12-11 NOTE — CONSULTS
"SPIRITUAL HEALTH SERVICES - Consult Note  [facility] ICU    Referral Source: [facility] consult; staff requested emotional support as pt transitions to comfort care.    Oriented pt Jose to [facility].  Jose reported that he fractured his hip from a fall.  He acknowledged feeling \"hopeless,\" but when I asked him to tell me more about his sense of hopelessness, he responded \"I don't know.\"  Jose reported that he has friends as being part of his support network.  Denominational or spirituality does not play a significant role in his life.    Plan: Informed pt how he can request further  support.  This author and other chaplains remain available per pt/family request.     Sonny Tomas M.Div., [facility]  Staff     SHS available 24/7 for emergent requests/referrals, either by paging the on-call  or by entering an ASAP/STAT consult in [facility], which will also page the on-call .   "

## 2024-12-11 NOTE — PLAN OF CARE
"ICU End of Shift Summary.  For vital signs and complete assessments, please see documentation flowsheets.     Pertinent assessments: Patient alert. Denies pain. Declines 02. On comfort care. Declines food or drink. Brother and sister in law at bedside. 2 bms-soft/brown stool. Shen in place with adequete urine.   Major Shift Events: none   Plan (Upcoming Events): comfort cares  Discharge/Transfer Needs: transfer orders are in     Bedside Shift Report Completed :   Bedside Safety Check Completed:      Problem: Adult Inpatient Plan of Care  Goal: Plan of Care Review  Description: The Plan of Care Review/Shift note should be completed every shift.  The Outcome Evaluation is a brief statement about your assessment that the patient is improving, declining, or no change.  This information will be displayed automatically on your shift  note.  Outcome: Not Progressing  Flowsheets (Taken 12/11/2024 1628)  Outcome Evaluation: Patient denies pain. Declined NC. Offered food and declined. Shen in place. 2 soft BMs.  Plan of Care Reviewed With:   patient   sibling  Goal: Patient-Specific Goal (Individualized)  Description: You can add care plan individualizations to a care plan. Examples of Individualization might be:  \"Parent requests to be called daily at 9am for status\", \"I have a hard time hearing out of my right ear\", or \"Do not touch me to wake me up as it startles  me\".  Outcome: Not Progressing  Goal: Absence of Hospital-Acquired Illness or Injury  Outcome: Progressing  Intervention: Prevent Skin Injury  Recent Flowsheet Documentation  Taken 12/11/2024 1400 by Michelle Rodriguez RN  Body Position: refuses positioning  Taken 12/11/2024 1157 by Michelle Rodriguez RN  Body Position:   turned   right  Taken 12/11/2024 1000 by Michelle Rodriguez RN  Body Position:   supine, head elevated   right   refuses positioning  Intervention: Prevent and Manage VTE (Venous Thromboembolism) Risk  Recent Flowsheet " Documentation  Taken 12/11/2024 0900 by Michelle Rodriguez RN  VTE Prevention/Management: (on comfort cares) SCDs off (sequential compression devices)  Goal: Optimal Comfort and Wellbeing  Outcome: Progressing  Intervention: Provide Person-Centered Care  Recent Flowsheet Documentation  Taken 12/11/2024 0900 by Michelle Rodriguez RN  Trust Relationship/Rapport:   care explained   choices provided   emotional support provided   empathic listening provided   questions answered   questions encouraged   reassurance provided   thoughts/feelings acknowledged  Goal: Readiness for Transition of Care  Outcome: Progressing           Goal Outcome Evaluation:      Plan of Care Reviewed With: patient, sibling          Outcome Evaluation: Patient denies pain. Declined NC. Offered food and declined. Shen in place. 2 soft BMs.
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
## 2024-12-11 NOTE — PLAN OF CARE
"Goal Outcome Evaluation:      Plan of Care Reviewed With: patient, family    Overall patient progress: declining    Outcome Evaluation: this afternoon patient sats dropped to low 60, Bipap 100% applied 14/10. Stat lasix and CXR ordered and completed. MD cartwright at bedside performed Chest/ US  Another 60mg of IV Lasix given, along with neb. Stat VBG sent. Brother called and Updated. IVF stopped.    addendum: SATs continue to be in low 60%. Tele hub called, family were called per tele hub, family to come in to see patient.  Problem: Adult Inpatient Plan of Care  Goal: Plan of Care Review  Description: The Plan of Care Review/Shift note should be completed every shift.  The Outcome Evaluation is a brief statement about your assessment that the patient is improving, declining, or no change.  This information will be displayed automatically on your shift  note.  12/10/2024 2038 by Celestino Wellington RN  Outcome: Not Progressing  Flowsheets (Taken 12/10/2024 2038)  Outcome Evaluation: this afternoon patient sats dropped to low 60, Bipap 100% applied 14/10. Stat lasix and CXR ordered and completed. MD cartwright at bedside performed Chest/ US  Another 60mg of IV Lasix given, along with neb. Stat VBG sent. Brother called and Updated. IVF stopped.  Plan of Care Reviewed With: patient  12/10/2024 1215 by Celestino Wellington RN  Outcome: Progressing  Flowsheets (Taken 12/10/2024 1215)  Overall Patient Progress: improving  Goal: Patient-Specific Goal (Individualized)  Description: You can add care plan individualizations to a care plan. Examples of Individualization might be:  \"Parent requests to be called daily at 9am for status\", \"I have a hard time hearing out of my right ear\", or \"Do not touch me to wake me up as it startles  me\".  12/10/2024 2038 by Celestino Wellington, RN  Outcome: Not Progressing  12/10/2024 1215 by Celestino Wellington, RN  Outcome: Progressing  Flowsheets (Taken 12/10/2024 0830)  Individualized Care Needs: " none  Goal: Absence of Hospital-Acquired Illness or Injury  12/10/2024 2038 by Celestino Wellington RN  Outcome: Not Progressing  12/10/2024 1215 by Celestino Wellington RN  Outcome: Progressing  Intervention: Identify and Manage Fall Risk  Recent Flowsheet Documentation  Taken 12/10/2024 1600 by Celestino Wellington RN  Safety Promotion/Fall Prevention:   activity supervised   increase visualization of patient   lighting adjusted   treat underlying cause  Taken 12/10/2024 1215 by Celestino Wellington RN  Safety Promotion/Fall Prevention:   activity supervised   increase visualization of patient   lighting adjusted   treat underlying cause  Taken 12/10/2024 0830 by Celestino Wellington RN  Safety Promotion/Fall Prevention:   activity supervised   increase visualization of patient   lighting adjusted   treat underlying cause  Intervention: Prevent Skin Injury  Recent Flowsheet Documentation  Taken 12/10/2024 1800 by Celestino Wellington RN  Body Position:   turned   side-lying   right  Taken 12/10/2024 1600 by Celestino Wellington, RN  Body Position:   turned   side-lying  Taken 12/10/2024 1215 by Celestino Wellington RN  Body Position:   turned   side-lying  Taken 12/10/2024 1030 by Celestino Wellington RN  Body Position:   turned   heels elevated   legs elevated   side-lying 30 degrees   left  Taken 12/10/2024 0830 by Celestino Wellington RN  Body Position:   turned   side-lying  Taken 12/10/2024 0811 by Celestino Wellington RN  Body Position:   right   lower extremity elevated   legs elevated   heels elevated  Intervention: Prevent and Manage VTE (Venous Thromboembolism) Risk  Recent Flowsheet Documentation  Taken 12/10/2024 1600 by Celestino Wellington RN  VTE Prevention/Management: SCDs on (sequential compression devices)  Taken 12/10/2024 1215 by Celestino Wellington RN  VTE Prevention/Management: SCDs on (sequential compression devices)  Taken 12/10/2024 0830 by Celestino Wellington RN  VTE Prevention/Management: SCDs on (sequential compression  devices)  Goal: Optimal Comfort and Wellbeing  12/10/2024 2038 by Celestino Wellington RN  Outcome: Not Progressing  12/10/2024 1215 by Celestino Wellington RN  Outcome: Progressing  Intervention: Provide Person-Centered Care  Recent Flowsheet Documentation  Taken 12/10/2024 1600 by Celestino Wellington RN  Trust Relationship/Rapport:   care explained   choices provided   emotional support provided   questions encouraged   reassurance provided   questions answered  Taken 12/10/2024 1215 by Celestino Wellington RN  Trust Relationship/Rapport:   care explained   choices provided   emotional support provided   questions encouraged   reassurance provided   questions answered  Taken 12/10/2024 0830 by Celestino Wellington RN  Trust Relationship/Rapport:   care explained   choices provided   emotional support provided   questions encouraged   reassurance provided   questions answered  Goal: Readiness for Transition of Care  12/10/2024 2038 by Celestino Wellington RN  Outcome: Not Progressing  12/10/2024 1215 by Celestino Wellington RN  Outcome: Progressing  Intervention: Mutually Develop Transition Plan  Recent Flowsheet Documentation  Taken 12/10/2024 0800 by Celestino Wellington RN  Equipment Currently Used at Home: none     Problem: Gastrointestinal Bleeding  Goal: Optimal Coping with Acute Illness  12/10/2024 2038 by Celestino Wellington RN  Outcome: Not Progressing  12/10/2024 1215 by Celestino Wellington RN  Outcome: Progressing  Intervention: Optimize Psychosocial Response  Recent Flowsheet Documentation  Taken 12/10/2024 1600 by Celestino Wellington RN  Supportive Measures:   active listening utilized   decision-making supported   goal-setting facilitated   relaxation techniques promoted   self-reflection promoted  Taken 12/10/2024 1215 by Celestino Wellington RN  Supportive Measures:   active listening utilized   decision-making supported   goal-setting facilitated   relaxation techniques promoted   self-reflection promoted  Taken 12/10/2024 0830  by Celestino Wellington RN  Supportive Measures:   active listening utilized   decision-making supported   goal-setting facilitated   relaxation techniques promoted   self-reflection promoted  Goal: Hemostasis  12/10/2024 2038 by Celestino Wellington RN  Outcome: Not Progressing  12/10/2024 1215 by Celestino Wellington RN  Outcome: Progressing  Intervention: Manage Gastrointestinal Bleeding  Recent Flowsheet Documentation  Taken 12/10/2024 1600 by Celestino Wellington RN  Environmental Support:   calm environment promoted   distractions minimized   rest periods encouraged  Taken 12/10/2024 1215 by Celestino Wellington RN  Stabilization Measures: blood products administered  Environmental Support:   calm environment promoted   distractions minimized   rest periods encouraged  Taken 12/10/2024 0830 by Celestino Wellington RN  Stabilization Measures: blood products administered  Environmental Support:   calm environment promoted   distractions minimized   rest periods encouraged     Problem: Comorbidity Management  Goal: Maintenance of COPD Symptom Control  12/10/2024 2038 by Celestino Wellington RN  Outcome: Not Progressing  12/10/2024 1215 by Celestino Wellington RN  Outcome: Progressing  Intervention: Maintain COPD Symptom Control  Recent Flowsheet Documentation  Taken 12/10/2024 1600 by Celestino Wellington RN  Medication Review/Management: medications reviewed  Taken 12/10/2024 1215 by Celestino Wellington RN  Medication Review/Management: medications reviewed  Taken 12/10/2024 0830 by Celestino Wellington RN  Medication Review/Management: medications reviewed  Goal: Blood Pressure in Desired Range  12/10/2024 2038 by Celestino Wellington RN  Outcome: Not Progressing  12/10/2024 1215 by Celestino Wellington RN  Outcome: Progressing  Intervention: Maintain Blood Pressure Management  Recent Flowsheet Documentation  Taken 12/10/2024 1600 by Celestino Wellington RN  Medication Review/Management: medications reviewed  Taken 12/10/2024 1215 by Celestino Wellington  RN  Medication Review/Management: medications reviewed  Taken 12/10/2024 0830 by Celestino Wellington RN  Medication Review/Management: medications reviewed     Problem: Skin Injury Risk Increased  Goal: Skin Health and Integrity  Outcome: Not Progressing  Intervention: Plan: Nurse Driven Intervention: Moisture Management  Recent Flowsheet Documentation  Taken 12/10/2024 1600 by Celestino Wellington RN  Moisture Interventions:   No brief in bed   Perineal cleanser  Taken 12/10/2024 1215 by Celestino Wellington RN  Moisture Interventions:   No brief in bed   Perineal cleanser  Taken 12/10/2024 0830 by Celestino Wellington RN  Moisture Interventions:   No brief in bed   Perineal cleanser  Bathing/Skin Care:   bath, chlorhexidine wipes   wipes, CHG   moisturizer applied  Intervention: Optimize Skin Protection  Recent Flowsheet Documentation  Taken 12/10/2024 1800 by Celestino Wellington RN  Head of Bed (HOB) Positioning: HOB at 30 degrees  Taken 12/10/2024 1600 by Celestino Wellington RN  Activity Management: activity adjusted per tolerance  Head of Bed (HOB) Positioning: HOB at 30-45 degrees  Taken 12/10/2024 1215 by Celestino Wellington RN  Activity Management: activity adjusted per tolerance  Head of Bed (HOB) Positioning: HOB at 30-45 degrees  Taken 12/10/2024 1030 by Celestino Wellington RN  Head of Bed (HOB) Positioning: HOB at 30 degrees  Taken 12/10/2024 0830 by Celestino Wellington RN  Activity Management: activity adjusted per tolerance  Head of Bed (HOB) Positioning: HOB at 30-45 degrees  Taken 12/10/2024 0811 by Celestino Wellington RN  Activity Management: activity adjusted per tolerance  Head of Bed (HOB) Positioning: HOB at 30 degrees  Intervention: Promote and Optimize Oral Intake  Recent Flowsheet Documentation  Taken 12/10/2024 1600 by Celestion Wellington RN  Oral Nutrition Promotion: rest periods promoted  Taken 12/10/2024 1215 by Celestino Wellington RN  Oral Nutrition Promotion: rest periods promoted
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
## 2024-12-11 NOTE — PROGRESS NOTES
"GASTROENTEROLOGY PROGRESS NOTE        SUBJECTIVE: Per nursing, two dark-colored bowel movements this morning.  In discussion with patient's nurse, patient and family are interested in pursuing comfort care.  Discussed at length an upper endoscopy.  Patient is not interested in a procedure at this time.     OBJECTIVE:    /73   Pulse 101   Temp 97.6  F (36.4  C) (Oral)   Resp 26   Ht 1.829 m (6' 0.01\")   Wt 72.1 kg (158 lb 15.2 oz)   SpO2 (!) 76%   BMI 21.55 kg/m    Temp (24hrs), Av.7  F (36.5  C), Min:97.6  F (36.4  C), Max:98  F (36.7  C)    Patient Vitals for the past 72 hrs:   Weight   12/10/24 0811 72.1 kg (158 lb 15.2 oz)   12/10/24 0516 73.2 kg (161 lb 6 oz)       Intake/Output Summary (Last 24 hours) at 2024 1014  Last data filed at 2024 0600  Gross per 24 hour   Intake 1427.2 ml   Output 2360 ml   Net -932.8 ml        PHYSICAL EXAM     Constitutional: No acute distress, resting comfortably  Abdomen: Soft, nontender, nondistended         Additional Comments:  ROS, FH, SH: See initial GI consult for details.     I have reviewed the patient's new clinical lab results:     Recent Labs   Lab Test 12/10/24  1742 12/10/24  1429 12/10/24  1023 12/10/24  1000 12/10/24  0803 12/10/24  0519 21  1927 21  1453   WBC  --   --   --  47.4* 44.9* 19.2*   < > 12.5*   HGB 11.0* 10.8*  --  10.9* 9.1* 3.8*   < > 16.3   MCV  --   --   --  90 92 99   < > 88   PLT  --   --   --  149* 184 99*   < > 298   INR  --   --  1.34*  --   --  1.86*  --  1.12    < > = values in this interval not displayed.     Recent Labs   Lab Test 12/10/24  1000 12/10/24  0803 12/10/24  0519 21  0815   POTASSIUM 3.8 4.2 1.8* 3.7   CHLORIDE 109*  --  124* 111*   CO2 21*  --  9* 26   BUN 31.5*  --  16.3 12   ANIONGAP 16*  --  15 6     Recent Labs   Lab Test 12/10/24  0519 12/10/24  0513 21  0815 21   ALBUMIN 1.3*  --  3.2*  --  3.4   BILITOTAL 0.4  --  0.4  --  1.0   ALT 13  --  39 "  --  28   AST 25  --  25  --  16   PROTEIN  --  20*  --  30*  --    LIPASE 7*  --   --   --   --        ASSESSMENT/ PLAN    Jose Vazquez is a 79-year-old male with PMH as mentioned above who was admitted 12/10/24 with hypoxia and coffee ground emesis. Discharged from [facility] yesterday after surgical treatment of hip fracture.     1) Coffee ground emesis: Patient presented with coffee ground emesis (has not recurred since arrival). Found to have hemoglobin 3.8.  Now 11.  Hypotensive, tachycardic, tachypneic. Currently in ICU, on nasal cannula. Presentation concerning for acute GI bleeding.      2) Acute on chronic anemia: hemoglobin is 11. Reviewed with IM, there is suspicion that due to multiple spurious results on his initial labs they might have been drawn downstream of saline infusion. The initial hemoglobin may have been falsely low. Will continue to trend.     3) Possible sepsis: Could also be contributing to current presentation. Blood/urine cultures pending. Currently on antibiotics.     PLAN:  -- Patient and family decided to be comfort care only.  -- Patient understanding of an upper endoscopy and is declining EGD at this time.  -- Monitor hemoglobin; transfuse PRN.  -- Monitor for recurrent GI bleeding.  -- Pantoprazole gtt.  -- Antibiotics per IM.    Total time: 35 minutes of total time was spent providing patient care, including patient evaluation, reviewing documentation/test results, and .     Discussed with Dr. Bonilla. Will no longer follow. Please call with questions or change in condition.     Kathy Tovar PA-C  [facility] ( [facility])

## 2024-12-11 NOTE — PLAN OF CARE
Problem: Adult Inpatient Plan of Care  Goal: Plan of Care Review  Description: The Plan of Care Review/Shift note should be completed every shift.  The Outcome Evaluation is a brief statement about your assessment that the patient is improving, declining, or no change.  This information will be displayed automatically on your shift  note.  12/11/2024 0512 by Camryn Matute RN  Flowsheets (Taken 12/11/2024 0512)  Outcome Evaluation: Transitioned to comfort care. PRN Diluadid administered. Family at bedside.  Plan of Care Reviewed With: family  Overall Patient Progress: declining  12/11/2024 0256 by Camrny Matute RN  Flowsheets (Taken 12/11/2024 0256)  Outcome Evaluation: Transition to comfort care. Family at bedside. PRN diluadid administered.  Plan of Care Reviewed With: family  Overall Patient Progress: no change  Goal: Absence of Hospital-Acquired Illness or Injury  Intervention: Prevent Skin Injury  Recent Flowsheet Documentation  Taken 12/11/2024 0400 by Camryn Matute RN  Body Position:   weight shifting   supine, head elevated  Taken 12/11/2024 0100 by Camryn Matute RN  Body Position:   turned   left   Goal Outcome Evaluation:      Plan of Care Reviewed With: family    Overall Patient Progress: decliningOverall Patient Progress: declining    Outcome Evaluation: Transitioned to comfort care. PRN Diluadid administered. Family at bedside.

## 2024-12-12 LAB — BACTERIA BLD CULT: NORMAL

## 2024-12-12 PROCEDURE — 120N000001 HC R&B MED SURG/OB

## 2024-12-12 PROCEDURE — 99231 SBSQ HOSP IP/OBS SF/LOW 25: CPT | Performed by: INTERNAL MEDICINE

## 2024-12-12 ASSESSMENT — ACTIVITIES OF DAILY LIVING (ADL)
ADLS_ACUITY_SCORE: 57
ADLS_ACUITY_SCORE: 61
ADLS_ACUITY_SCORE: 65
ADLS_ACUITY_SCORE: 57
ADLS_ACUITY_SCORE: 57
ADLS_ACUITY_SCORE: 65
ADLS_ACUITY_SCORE: 57
ADLS_ACUITY_SCORE: 61
ADLS_ACUITY_SCORE: 57
ADLS_ACUITY_SCORE: 61
ADLS_ACUITY_SCORE: 65
ADLS_ACUITY_SCORE: 61
ADLS_ACUITY_SCORE: 57
ADLS_ACUITY_SCORE: 61
ADLS_ACUITY_SCORE: 57
ADLS_ACUITY_SCORE: 57

## 2024-12-12 NOTE — PLAN OF CARE
Pt is more alert today. Talking and smiling. Denies pain. On comfort cares. Rolls good in bed, has not been up since admission. Afebrile. Shen removed and due to void by 1730. Report given to Eric FIGUEROA on 5ht. Pt to transferred to room 549, Jaziel levin called and updated about room change.   Goal Outcome Evaluation:

## 2024-12-12 NOTE — PLAN OF CARE
"Pt has remained comfortable - has had 2 inc stools \"tarry\" stools this shift - refused O2 and anything for pain. Slept well. Shen remains patent clear nicci urine.      Problem: Adult Inpatient Plan of Care  Goal: Plan of Care Review  Description: The Plan of Care Review/Shift note should be completed every shift.  The Outcome Evaluation is a brief statement about your assessment that the patient is improving, declining, or no change.  This information will be displayed automatically on your shift  note.  Flowsheets (Taken 12/12/2024 0411)  Plan of Care Reviewed With: patient  Goal: Patient-Specific Goal (Individualized)  Description: You can add care plan individualizations to a care plan. Examples of Individualization might be:  \"Parent requests to be called daily at 9am for status\", \"I have a hard time hearing out of my right ear\", or \"Do not touch me to wake me up as it startles  me\".  Flowsheets (Taken 12/12/2024 0411)  Individualized Care Needs: continue to maintain appropriate comfort for pt  Goal: Absence of Hospital-Acquired Illness or Injury  Intervention: Identify and Manage Fall Risk  Recent Flowsheet Documentation  Taken 12/12/2024 0400 by Bakari Ambrocio RN  Safety Promotion/Fall Prevention:   activity supervised   increase visualization of patient   lighting adjusted   treat underlying cause  Taken 12/11/2024 2300 by Bakari Ambrocio RN  Safety Promotion/Fall Prevention:   activity supervised   increase visualization of patient   lighting adjusted   treat underlying cause  Intervention: Prevent Skin Injury  Recent Flowsheet Documentation  Taken 12/12/2024 0400 by Bakari Ambrocio, RN  Body Position:   right   heels elevated   legs elevated   side-lying 30 degrees   weight shifting   upper extremity elevated   tilted  Taken 12/11/2024 2300 by Bakari Ambrocio RN  Body Position:   right   heels elevated   legs elevated   side-lying 30 degrees   weight shifting   upper extremity " elevated   tilted  Intervention: Prevent and Manage VTE (Venous Thromboembolism) Risk  Recent Flowsheet Documentation  Taken 12/12/2024 0400 by Bakari Ambrocio RN  VTE Prevention/Management: (comfort cares) SCDs off (sequential compression devices)  Taken 12/11/2024 2300 by Bakari Ambrocio RN  VTE Prevention/Management: (comfort cares) SCDs off (sequential compression devices)  Goal: Optimal Comfort and Wellbeing  Intervention: Provide Person-Centered Care  Recent Flowsheet Documentation  Taken 12/12/2024 0400 by Bakari Ambrocio RN  Trust Relationship/Rapport:   care explained   choices provided   emotional support provided   empathic listening provided   questions answered   questions encouraged   reassurance provided   thoughts/feelings acknowledged  Taken 12/11/2024 2300 by Bakari Ambrocio RN  Trust Relationship/Rapport:   care explained   choices provided   emotional support provided   empathic listening provided   questions answered   questions encouraged   reassurance provided   thoughts/feelings acknowledged     Problem: Skin Injury Risk Increased  Goal: Skin Health and Integrity  Intervention: Plan: Nurse Driven Intervention: Moisture Management  Recent Flowsheet Documentation  Taken 12/11/2024 2300 by Bakari Ambrocio RN  Bathing/Skin Care:   bath, partial   linen changed   incontinence care  Intervention: Optimize Skin Protection  Recent Flowsheet Documentation  Taken 12/12/2024 0400 by Bakari Ambrocio RN  Activity Management: bedrest  Head of Bed (HOB) Positioning: HOB at 30 degrees  Taken 12/11/2024 2300 by Bakari Ambrocio RN  Activity Management: bedrest  Head of Bed (HOB) Positioning: HOB at 30 degrees   Goal Outcome Evaluation:      Plan of Care Reviewed With: patient

## 2024-12-12 NOTE — PROGRESS NOTES
Notified provider about indwelling bolden catheter discussed removal or continued need.    Did provider choose to remove indwelling bolden catheter? NO    Provider's bolden indication for keeping indwelling bolden catheter: Indication for continued use: End of Live    Is there an order for indwelling bolden catheter? YES    *If there is a plan to keep bolden catheter in place at discharge daily notification with provider is not necessary, but please add a notation in the treatment team sticky note that the patient will be discharging with the catheter.

## 2024-12-12 NOTE — PLAN OF CARE
Resumed care of pt from [id number].Patient is on comfort cares. He is  alert. Denies pain. On room air. declines food or drink. Brother and sister in law at bedside. 1 bms-soft/brown stool. Bolden in place with adequete urine.     Notified provider about indwelling bolden catheter discussed removal or continued need.    Did provider choose to remove indwelling bolden catheter? NO    Provider's bolden indication for keeping indwelling bolden catheter: Indication for continued use: Strict 1-2 Hour I & O if external catheters are not an option    Is there an order for indwelling bolden catheter? YES    *If there is a plan to keep bolden catheter in place at discharge daily notification with provider is not necessary, but please add a notation in the treatment team sticky note that the patient will be discharging with the catheter.     Goal Outcome Evaluation:
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
## 2024-12-13 PROCEDURE — 250N000013 HC RX MED GY IP 250 OP 250 PS 637: Performed by: INTERNAL MEDICINE

## 2024-12-13 PROCEDURE — 120N000001 HC R&B MED SURG/OB

## 2024-12-13 PROCEDURE — 99232 SBSQ HOSP IP/OBS MODERATE 35: CPT | Performed by: INTERNAL MEDICINE

## 2024-12-13 RX ORDER — METOPROLOL SUCCINATE 50 MG/1
50 TABLET, EXTENDED RELEASE ORAL DAILY
Status: DISCONTINUED | OUTPATIENT
Start: 2024-12-13 | End: 2024-12-14

## 2024-12-13 RX ORDER — AMLODIPINE BESYLATE 5 MG/1
5 TABLET ORAL DAILY
Status: DISCONTINUED | OUTPATIENT
Start: 2024-12-13 | End: 2024-12-19

## 2024-12-13 RX ORDER — ATORVASTATIN CALCIUM 40 MG/1
40 TABLET, FILM COATED ORAL EVERY EVENING
Status: DISCONTINUED | OUTPATIENT
Start: 2024-12-13 | End: 2024-12-16

## 2024-12-13 RX ORDER — DOCUSATE SODIUM 100 MG/1
100 CAPSULE, LIQUID FILLED ORAL 2 TIMES DAILY
Status: DISCONTINUED | OUTPATIENT
Start: 2024-12-13 | End: 2024-12-16

## 2024-12-13 RX ORDER — AMOXICILLIN 250 MG
1 CAPSULE ORAL 2 TIMES DAILY PRN
Status: DISCONTINUED | OUTPATIENT
Start: 2024-12-13 | End: 2024-12-20 | Stop reason: HOSPADM

## 2024-12-13 RX ORDER — AMOXICILLIN 250 MG
2 CAPSULE ORAL 2 TIMES DAILY PRN
Status: DISCONTINUED | OUTPATIENT
Start: 2024-12-13 | End: 2024-12-20 | Stop reason: HOSPADM

## 2024-12-13 RX ORDER — ASPIRIN 81 MG/1
81 TABLET ORAL DAILY
Status: DISCONTINUED | OUTPATIENT
Start: 2024-12-13 | End: 2024-12-14

## 2024-12-13 RX ORDER — LIDOCAINE 40 MG/G
CREAM TOPICAL
Status: DISCONTINUED | OUTPATIENT
Start: 2024-12-13 | End: 2024-12-20 | Stop reason: HOSPADM

## 2024-12-13 RX ORDER — CALCIUM CARBONATE 500 MG/1
1000 TABLET, CHEWABLE ORAL 4 TIMES DAILY PRN
Status: DISCONTINUED | OUTPATIENT
Start: 2024-12-13 | End: 2024-12-20 | Stop reason: HOSPADM

## 2024-12-13 RX ORDER — PANTOPRAZOLE SODIUM 40 MG/1
40 TABLET, DELAYED RELEASE ORAL
Status: DISCONTINUED | OUTPATIENT
Start: 2024-12-13 | End: 2024-12-16

## 2024-12-13 RX ORDER — ONDANSETRON 2 MG/ML
4 INJECTION INTRAMUSCULAR; INTRAVENOUS EVERY 6 HOURS PRN
Status: DISCONTINUED | OUTPATIENT
Start: 2024-12-13 | End: 2024-12-18

## 2024-12-13 RX ORDER — ACETAMINOPHEN 650 MG/1
650 SUPPOSITORY RECTAL EVERY 4 HOURS PRN
Status: DISCONTINUED | OUTPATIENT
Start: 2024-12-13 | End: 2024-12-20 | Stop reason: HOSPADM

## 2024-12-13 RX ORDER — AMLODIPINE BESYLATE 2.5 MG/1
2.5 TABLET ORAL DAILY
Status: DISCONTINUED | OUTPATIENT
Start: 2024-12-13 | End: 2024-12-14

## 2024-12-13 RX ORDER — ACETAMINOPHEN 325 MG/1
650 TABLET ORAL EVERY 4 HOURS PRN
Status: DISCONTINUED | OUTPATIENT
Start: 2024-12-13 | End: 2024-12-20 | Stop reason: HOSPADM

## 2024-12-13 RX ORDER — ONDANSETRON 4 MG/1
4 TABLET, ORALLY DISINTEGRATING ORAL EVERY 6 HOURS PRN
Status: DISCONTINUED | OUTPATIENT
Start: 2024-12-13 | End: 2024-12-20 | Stop reason: HOSPADM

## 2024-12-13 RX ADMIN — DOCUSATE SODIUM 100 MG: 100 CAPSULE, LIQUID FILLED ORAL at 20:22

## 2024-12-13 RX ADMIN — PANTOPRAZOLE SODIUM 40 MG: 40 TABLET, DELAYED RELEASE ORAL at 16:55

## 2024-12-13 ASSESSMENT — ACTIVITIES OF DAILY LIVING (ADL)
ADLS_ACUITY_SCORE: 59
ADLS_ACUITY_SCORE: 59
ADLS_ACUITY_SCORE: 61
ADLS_ACUITY_SCORE: 59
ADLS_ACUITY_SCORE: 59
ADLS_ACUITY_SCORE: 61
ADLS_ACUITY_SCORE: 61
ADLS_ACUITY_SCORE: 57
ADLS_ACUITY_SCORE: 59
ADLS_ACUITY_SCORE: 59
ADLS_ACUITY_SCORE: 61
ADLS_ACUITY_SCORE: 61
ADLS_ACUITY_SCORE: 59
ADLS_ACUITY_SCORE: 52
ADLS_ACUITY_SCORE: 61
ADLS_ACUITY_SCORE: 59
ADLS_ACUITY_SCORE: 61
ADLS_ACUITY_SCORE: 59
ADLS_ACUITY_SCORE: 52
ADLS_ACUITY_SCORE: 61
ADLS_ACUITY_SCORE: 59

## 2024-12-13 NOTE — PROGRESS NOTES
Spoke with brother, Jaziel and his spouse on the phone.  They were visiting Mr. Vazquez earlier today.  They would like him to transition off of comfort care as he is doing so well and be evaluated for acute rehab options.  Spoke with ARASH, as well.  PT/OT orders placed.  Will re-order restorative admission orders.  DNR/I orders will remain in place.

## 2024-12-13 NOTE — PLAN OF CARE
"To Do:  End of Shift Summary  For vital signs and complete assessments, please see documentation flowsheets.     Pertinent assessments: Pt is alert. Call light within reach and able to make needs known. VSS on RA. Urinal at bedside, and had 1 BM in shift. 2 PIV SL. Bruise on R hip. Denies pain.     /80 (BP Location: Left arm)   Pulse 85   Temp 97.8  F (36.6  C) (Oral)   Resp 16   Ht 1.829 m (6' 0.01\")   Wt 72.1 kg (158 lb 15.2 oz)   SpO2 (!) 87%   BMI 21.55 kg/m       Major Shift Events None   Treatment Plan: Comfort cares     Bedside Nurse: Yoanna Delgado RN                           Goal Outcome Evaluation:         Problem: Adult Inpatient Plan of Care  Goal: Plan of Care Review  Description: The Plan of Care Review/Shift note should be completed every shift.  The Outcome Evaluation is a brief statement about your assessment that the patient is improving, declining, or no change.  This information will be displayed automatically on your shift  note.  12/13/2024 1442 by Yoanna Delgado RN  Outcome: Not Progressing  12/13/2024 1159 by Yoanna Delgado RN  Outcome: Progressing  Goal: Patient-Specific Goal (Individualized)  Description: You can add care plan individualizations to a care plan. Examples of Individualization might be:  \"Parent requests to be called daily at 9am for status\", \"I have a hard time hearing out of my right ear\", or \"Do not touch me to wake me up as it startles  me\".  12/13/2024 1442 by Yoanna Delgado RN  Outcome: Not Progressing  12/13/2024 1159 by Yoanna Delgado RN  Outcome: Progressing  Goal: Absence of Hospital-Acquired Illness or Injury  12/13/2024 1442 by Yoanna Delgado RN  Outcome: Not Progressing  12/13/2024 1159 by Yoanna Delgado RN  Outcome: Progressing  Intervention: Identify and Manage Fall Risk  Recent Flowsheet Documentation  Taken 12/13/2024 0840 by Yoanna Delgado RN  Safety Promotion/Fall Prevention:   activity " supervised   clutter free environment maintained   lighting adjusted   room door open   room near nurse's station   safety round/check completed  Intervention: Prevent and Manage VTE (Venous Thromboembolism) Risk  Recent Flowsheet Documentation  Taken 12/13/2024 0840 by Yoanna Delgado RN  VTE Prevention/Management: SCDs off (sequential compression devices)  Goal: Optimal Comfort and Wellbeing  12/13/2024 1442 by Yoanna Delgado RN  Outcome: Not Progressing  12/13/2024 1159 by Yoanna Delgado RN  Outcome: Progressing  Goal: Readiness for Transition of Care  12/13/2024 1442 by Yoanna Delgado RN  Outcome: Not Progressing  12/13/2024 1159 by Yoanna Delgado RN  Outcome: Progressing     Problem: Gastrointestinal Bleeding  Goal: Optimal Coping with Acute Illness  12/13/2024 1442 by Yoanna Delgado RN  Outcome: Not Progressing  12/13/2024 1159 by Yoanna Delgado RN  Outcome: Not Progressing  Goal: Hemostasis  12/13/2024 1442 by Yoanna Delgado RN  Outcome: Not Progressing  12/13/2024 1159 by Yoanna Delgado RN  Outcome: Progressing     Problem: Skin Injury Risk Increased  Goal: Skin Health and Integrity  12/13/2024 1442 by Yoanna Delgado RN  Outcome: Not Progressing  12/13/2024 1159 by Yoanna Delgado RN  Outcome: Progressing  Intervention: Plan: Nurse Driven Intervention: Friction and Shear  Recent Flowsheet Documentation  Taken 12/13/2024 0840 by Yoanna Delgado RN  Friction/Shear Interventions: HOB 30 degrees or less  Intervention: Optimize Skin Protection  Recent Flowsheet Documentation  Taken 12/13/2024 0840 by Yoanna Delgado RN  Activity Management: bedrest
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
## 2024-12-13 NOTE — CONSULTS
Care Management Initial Consult    General Information  Assessment completed with: Children, Spouse or significant other, wife and son'  Type of CM/SW Visit: Initial Assessment    Primary Care Provider verified and updated as needed: Yes   Readmission within the last 30 days:        Reason for Consult: discharge planning, end of life/hospice  Advance Care Planning:       General Information Comments: Lives alone    Communication Assessment  Patient's communication style: spoken language (English or Bilingual)    Hearing Difficulty or Deaf: no   Wear Glasses or Blind: yes    Cognitive  Cognitive/Neuro/Behavioral: .WDL except  Level of Consciousness: intermittent confusion  Arousal Level: opens eyes spontaneously  Orientation: disoriented to, time, situation  Mood/Behavior: calm, cooperative  Best Language: 0 - No aphasia  Speech: garbled, illogical    Living Environment:   People in home: alone     Current living Arrangements: apartment      Able to return to prior arrangements: no  Living Arrangement Comments: Has no one to help care for him    Family/Social Support:  Care provided by: self  Provides care for:    Marital Status: Single  Support system: Sibling(s)          Description of Support System: Involved, Supportive    Support Assessment: Lacks adequate physical care, Adequate family and caregiver support    Current Resources:   Patient receiving home care services:          Community Resources:    Equipment currently used at home: none  Supplies currently used at home:      Employment/Financial:  Employment Status:          Financial Concerns:             Does the patient's insurance plan have a 3 day qualifying hospital stay waiver?  No    Lifestyle & Psychosocial Needs:  Social Drivers of Health     Food Insecurity: Low Risk  (12/10/2024)    Food Insecurity     Within the past 12 months, did you worry that your food would run out before you got money to buy more?: No     Within the past 12 months, did the  food you bought just not last and you didn t have money to get more?: No   Depression: Not at risk (9/20/2024)    Received from [facility]    PHQ-2     PHQ-2 Score: 0   Housing Stability: Low Risk  (12/10/2024)    Housing Stability     Do you have housing? : Yes     Are you worried about losing your housing?: No   Tobacco Use: Medium Risk (12/3/2024)    Received from [facility]    Patient History     Smoking Tobacco Use: Former     Smokeless Tobacco Use: Former     Passive Exposure: Not on file   Financial Resource Strain: Low Risk  (12/10/2024)    Financial Resource Strain     Within the past 12 months, have you or your family members you live with been unable to get utilities (heat, electricity) when it was really needed?: No   Alcohol Use: Not on file   Transportation Needs: Low Risk  (12/10/2024)    Transportation Needs     Within the past 12 months, has lack of transportation kept you from medical appointments, getting your medicines, non-medical meetings or appointments, work, or from getting things that you need?: No   Physical Activity: Not on file   Interpersonal Safety: High Risk (12/10/2024)    Interpersonal Safety     Do you feel physically and emotionally safe where you currently live?: No     Within the past 12 months, have you been hit, slapped, kicked or otherwise physically hurt by someone?: No     Within the past 12 months, have you been humiliated or emotionally abused in other ways by your partner or ex-partner?: No   Stress: Not on file   Social Connections: Unknown (3/19/2021)    Social Connection and Isolation Panel [NHANES]     Frequency of Communication with Friends and Family: Not on file     Frequency of Social Gatherings with Friends and Family: More than three times a week     Attends Congregation Services: Not on file     Active Member of Clubs or Organizations: Not on file     Attends Club or Organization Meetings: Not on file     Marital Status: Not on file   Health Literacy: Not on  file       Functional Status:  Prior to admission patient needed assistance:              Mental Health Status:          Chemical Dependency Status:                Values/Beliefs:  Spiritual, Cultural Beliefs, Temple Practices, Values that affect care:   yes              Discussed  Partnership in Safe Discharge Planning  document with patient/family: Yes:     Additional Information:  Discussed with family hospice services and philosophy. Currently patient is comfort care. BrotherJaziel saw patient today and states that. he is much better and patient and family want restorative care/TCU.     Updated MD of family and patient's wishes for TCU. MD has ordered therapy for patient.    Next Steps: SW will wait for therapy to evaluate patient and TCU referrals will be placed upon their recommendations.    Patient and family would like patient to go back to [facility] TCU if possible.    KENYA Krause   Inpatient Care Coordination   Supervisor  [facility]  [phone]      KENYA Gandhi

## 2024-12-13 NOTE — PLAN OF CARE
"End of Shift Summary  For vital signs and complete assessments, please see documentation flowsheets.     Pertinent assessments: pt alert, able to make needs known. Denies pain. Pt incontinent of bowel, 2 tarry stools. Pt voiding well, urinal at bedside. Assist of 2 with cares. 2PIV SL.  Pt refused to eat, but drinking water, and juice.     Major Shift Events: None     Treatment Plan: comfort cares.     Bedside Nurse: Minerva Delgado RN     Problem: Adult Inpatient Plan of Care  Goal: Plan of Care Review  Description: The Plan of Care Review/Shift note should be completed every shift.  The Outcome Evaluation is a brief statement about your assessment that the patient is improving, declining, or no change.  This information will be displayed automatically on your shift  note.  Outcome: Not Progressing  Flowsheets (Taken 12/13/2024 0443)  Outcome Evaluation: denies pain,  Plan of Care Reviewed With: patient  Overall Patient Progress: no change  Goal: Patient-Specific Goal (Individualized)  Description: You can add care plan individualizations to a care plan. Examples of Individualization might be:  \"Parent requests to be called daily at 9am for status\", \"I have a hard time hearing out of my right ear\", or \"Do not touch me to wake me up as it startles  me\".  Outcome: Not Progressing  Goal: Absence of Hospital-Acquired Illness or Injury  Outcome: Not Progressing  Intervention: Identify and Manage Fall Risk  Recent Flowsheet Documentation  Taken 12/12/2024 1659 by Minerva Delgado RN  Safety Promotion/Fall Prevention:   supervised activity   safety round/check completed   room organization consistent   room near nurse's station   patient and family education   lighting adjusted   increase visualization of patient   increased rounding and observation   clutter free environment maintained  Intervention: Prevent Skin Injury  Recent Flowsheet Documentation  Taken 12/12/2024 1600 by Minerva Delgado RN  Body Position:   " turned   left  Intervention: Prevent and Manage VTE (Venous Thromboembolism) Risk  Recent Flowsheet Documentation  Taken 12/12/2024 1659 by Minerva Delgado RN  VTE Prevention/Management: SCDs on (sequential compression devices)  Goal: Optimal Comfort and Wellbeing  Outcome: Not Progressing  Intervention: Provide Person-Centered Care  Recent Flowsheet Documentation  Taken 12/12/2024 1659 by Minevra Delgado RN  Trust Relationship/Rapport:   care explained   choices provided  Goal: Readiness for Transition of Care  Outcome: Not Progressing     Problem: Gastrointestinal Bleeding  Goal: Optimal Coping with Acute Illness  Outcome: Not Progressing  Goal: Hemostasis  Outcome: Not Progressing     Problem: Skin Injury Risk Increased  Goal: Skin Health and Integrity  Outcome: Not Progressing  Intervention: Plan: Nurse Driven Intervention: Moisture Management  Recent Flowsheet Documentation  Taken 12/12/2024 1659 by Minerva Delgado RN  Moisture Interventions:   Encourage regular toileting   No brief in bed  Intervention: Plan: Nurse Driven Intervention: Friction and Shear  Recent Flowsheet Documentation  Taken 12/12/2024 1659 by Minerva Delgado RN  Friction/Shear Interventions: HOB 30 degrees or less  Intervention: Optimize Skin Protection  Recent Flowsheet Documentation  Taken 12/12/2024 1600 by Minerva Delgado RN  Head of Bed (HOB) Positioning: HOB at 30-45 degrees   Goal Outcome Evaluation:      Plan of Care Reviewed With: patient    Overall Patient Progress: no changeOverall Patient Progress: no change    Outcome Evaluation: denies pain,

## 2024-12-14 ENCOUNTER — APPOINTMENT (OUTPATIENT)
Dept: OCCUPATIONAL THERAPY | Facility: CLINIC | Age: 79
DRG: 871 | End: 2024-12-14
Attending: INTERNAL MEDICINE
Payer: COMMERCIAL

## 2024-12-14 ENCOUNTER — APPOINTMENT (OUTPATIENT)
Dept: PHYSICAL THERAPY | Facility: CLINIC | Age: 79
DRG: 871 | End: 2024-12-14
Attending: INTERNAL MEDICINE
Payer: COMMERCIAL

## 2024-12-14 ENCOUNTER — APPOINTMENT (OUTPATIENT)
Dept: ULTRASOUND IMAGING | Facility: CLINIC | Age: 79
DRG: 871 | End: 2024-12-14
Attending: INTERNAL MEDICINE
Payer: COMMERCIAL

## 2024-12-14 LAB
ANION GAP SERPL CALCULATED.3IONS-SCNC: 13 MMOL/L (ref 7–15)
ANION GAP SERPL CALCULATED.3IONS-SCNC: 8 MMOL/L (ref 7–15)
BUN SERPL-MCNC: 23.5 MG/DL (ref 8–23)
BUN SERPL-MCNC: 24.3 MG/DL (ref 8–23)
CA-I BLD-MCNC: 4.4 MG/DL (ref 4.4–5.2)
CALCIUM SERPL-MCNC: 8.1 MG/DL (ref 8.8–10.4)
CALCIUM SERPL-MCNC: 8.5 MG/DL (ref 8.8–10.4)
CHLORIDE SERPL-SCNC: 111 MMOL/L (ref 98–107)
CHLORIDE SERPL-SCNC: 116 MMOL/L (ref 98–107)
CREAT SERPL-MCNC: 0.84 MG/DL (ref 0.67–1.17)
CREAT SERPL-MCNC: 0.87 MG/DL (ref 0.67–1.17)
EGFRCR SERPLBLD CKD-EPI 2021: 89 ML/MIN/1.73M2
EGFRCR SERPLBLD CKD-EPI 2021: 90 ML/MIN/1.73M2
ERYTHROCYTE [DISTWIDTH] IN BLOOD BY AUTOMATED COUNT: 14.9 % (ref 10–15)
GLUCOSE SERPL-MCNC: 123 MG/DL (ref 70–99)
GLUCOSE SERPL-MCNC: 162 MG/DL (ref 70–99)
HCO3 SERPL-SCNC: 24 MMOL/L (ref 22–29)
HCO3 SERPL-SCNC: 27 MMOL/L (ref 22–29)
HCT VFR BLD AUTO: 39.9 % (ref 40–53)
HGB BLD-MCNC: 12.6 G/DL (ref 13.3–17.7)
MAGNESIUM SERPL-MCNC: 2.2 MG/DL (ref 1.7–2.3)
MCH RBC QN AUTO: 28.4 PG (ref 26.5–33)
MCHC RBC AUTO-ENTMCNC: 31.6 G/DL (ref 31.5–36.5)
MCV RBC AUTO: 90 FL (ref 78–100)
PLATELET # BLD AUTO: 147 10E3/UL (ref 150–450)
POTASSIUM SERPL-SCNC: 3.2 MMOL/L (ref 3.4–5.3)
POTASSIUM SERPL-SCNC: 3.7 MMOL/L (ref 3.4–5.3)
POTASSIUM SERPL-SCNC: 3.7 MMOL/L (ref 3.4–5.3)
RBC # BLD AUTO: 4.44 10E6/UL (ref 4.4–5.9)
SODIUM SERPL-SCNC: 148 MMOL/L (ref 135–145)
SODIUM SERPL-SCNC: 151 MMOL/L (ref 135–145)
WBC # BLD AUTO: 10.9 10E3/UL (ref 4–11)

## 2024-12-14 PROCEDURE — 83735 ASSAY OF MAGNESIUM: CPT | Performed by: INTERNAL MEDICINE

## 2024-12-14 PROCEDURE — 97535 SELF CARE MNGMENT TRAINING: CPT | Mod: GO | Performed by: OCCUPATIONAL THERAPIST

## 2024-12-14 PROCEDURE — 97165 OT EVAL LOW COMPLEX 30 MIN: CPT | Mod: GO | Performed by: OCCUPATIONAL THERAPIST

## 2024-12-14 PROCEDURE — 82565 ASSAY OF CREATININE: CPT | Performed by: INTERNAL MEDICINE

## 2024-12-14 PROCEDURE — 250N000013 HC RX MED GY IP 250 OP 250 PS 637: Performed by: INTERNAL MEDICINE

## 2024-12-14 PROCEDURE — 85014 HEMATOCRIT: CPT | Performed by: INTERNAL MEDICINE

## 2024-12-14 PROCEDURE — 36415 COLL VENOUS BLD VENIPUNCTURE: CPT | Performed by: INTERNAL MEDICINE

## 2024-12-14 PROCEDURE — 80048 BASIC METABOLIC PNL TOTAL CA: CPT | Performed by: INTERNAL MEDICINE

## 2024-12-14 PROCEDURE — 97161 PT EVAL LOW COMPLEX 20 MIN: CPT | Mod: GP

## 2024-12-14 PROCEDURE — 99233 SBSQ HOSP IP/OBS HIGH 50: CPT | Performed by: INTERNAL MEDICINE

## 2024-12-14 PROCEDURE — 93970 EXTREMITY STUDY: CPT

## 2024-12-14 PROCEDURE — 82330 ASSAY OF CALCIUM: CPT | Performed by: INTERNAL MEDICINE

## 2024-12-14 PROCEDURE — 85048 AUTOMATED LEUKOCYTE COUNT: CPT | Performed by: INTERNAL MEDICINE

## 2024-12-14 PROCEDURE — 97530 THERAPEUTIC ACTIVITIES: CPT | Mod: GP

## 2024-12-14 PROCEDURE — 120N000001 HC R&B MED SURG/OB

## 2024-12-14 RX ORDER — POTASSIUM CHLORIDE 1.5 G/1.58G
40 POWDER, FOR SOLUTION ORAL ONCE
Status: COMPLETED | OUTPATIENT
Start: 2024-12-14 | End: 2024-12-14

## 2024-12-14 RX ORDER — METOPROLOL SUCCINATE 25 MG/1
25 TABLET, EXTENDED RELEASE ORAL DAILY
Status: DISCONTINUED | OUTPATIENT
Start: 2024-12-14 | End: 2024-12-19

## 2024-12-14 RX ADMIN — POTASSIUM CHLORIDE 40 MEQ: 1.5 POWDER, FOR SOLUTION ORAL at 09:26

## 2024-12-14 RX ADMIN — METOPROLOL SUCCINATE 25 MG: 25 TABLET, EXTENDED RELEASE ORAL at 16:17

## 2024-12-14 RX ADMIN — PANTOPRAZOLE SODIUM 40 MG: 40 TABLET, DELAYED RELEASE ORAL at 16:16

## 2024-12-14 RX ADMIN — PANTOPRAZOLE SODIUM 40 MG: 40 TABLET, DELAYED RELEASE ORAL at 09:26

## 2024-12-14 RX ADMIN — ATORVASTATIN CALCIUM 40 MG: 40 TABLET, FILM COATED ORAL at 20:10

## 2024-12-14 ASSESSMENT — ACTIVITIES OF DAILY LIVING (ADL)
ADLS_ACUITY_SCORE: 52
ADLS_ACUITY_SCORE: 51
ADLS_ACUITY_SCORE: 51
ADLS_ACUITY_SCORE: 52
ADLS_ACUITY_SCORE: 51
ADLS_ACUITY_SCORE: 53
ADLS_ACUITY_SCORE: 51
ADLS_ACUITY_SCORE: 52
ADLS_ACUITY_SCORE: 51
ADLS_ACUITY_SCORE: 53
ADLS_ACUITY_SCORE: 51
ADLS_ACUITY_SCORE: 51

## 2024-12-14 NOTE — PLAN OF CARE
"Pertinent assessments: Pt Alert to self. Not out of bed this shift. VSS. 2LNC. Denies pain. Incision dressing CDI. Using urinal in bed.     Major Shift Events :Uneventful    Treatment Plan: Monitor symptoms, pain management, pending discharge.  Bedside Nurse: Melissa Holm RN                       Problem: Adult Inpatient Plan of Care  Goal: Plan of Care Review  Description: The Plan of Care Review/Shift note should be completed every shift.  The Outcome Evaluation is a brief statement about your assessment that the patient is improving, declining, or no change.  This information will be displayed automatically on your shift  note.  Outcome: Progressing  Flowsheets (Taken 12/14/2024 0620)  Outcome Evaluation: Alert to self, Denies pain  Plan of Care Reviewed With: patient  Overall Patient Progress: no change  Goal: Patient-Specific Goal (Individualized)  Description: You can add care plan individualizations to a care plan. Examples of Individualization might be:  \"Parent requests to be called daily at 9am for status\", \"I have a hard time hearing out of my right ear\", or \"Do not touch me to wake me up as it startles  me\".  Outcome: Progressing  Goal: Absence of Hospital-Acquired Illness or Injury  Outcome: Progressing  Intervention: Identify and Manage Fall Risk  Recent Flowsheet Documentation  Taken 12/14/2024 0050 by Melissa Holm RN  Safety Promotion/Fall Prevention:   safety round/check completed   activity supervised  Intervention: Prevent Skin Injury  Recent Flowsheet Documentation  Taken 12/14/2024 0050 by Melissa Holm RN  Body Position: position changed independently  Intervention: Prevent and Manage VTE (Venous Thromboembolism) Risk  Recent Flowsheet Documentation  Taken 12/14/2024 0050 by Melissa Holm RN  VTE Prevention/Management: SCDs off (sequential compression devices)  Goal: Optimal Comfort and Wellbeing  Outcome: Progressing  Goal: Readiness for Transition of Care  Outcome: Progressing   Goal Outcome " Evaluation:      Plan of Care Reviewed With: patient    Overall Patient Progress: no changeOverall Patient Progress: no change    Outcome Evaluation: Alert to self, Denies pain

## 2024-12-14 NOTE — PLAN OF CARE
"Goal Outcome Evaluation:  nd of Shift Summary  For vital signs and complete assessments, please see documentation flowsheets.     Pertinent assessments: Pt Alert to self. VSS and on 3L oxymask . Tolerated diet and no nausea reported. Pt had 3 loose BM during the shift.  Denied pain. R hip incision CDI. Replaced K and recheck was WNL. Ultrasound done for BLE and results pending.Ambulated A X2 with anahi steady.    Major Shift Events : None     Treatment Plan: Monitor symptoms, pain management,  respiratory management,pending discharge.  Bedside Nurse: Shelby Garcia RN       Plan of Care Reviewed With: patient    Overall Patient Progress: no changeOverall Patient Progress: no change    Outcome Evaluation: Alert to self, denies pain, using anahi steady to the BR.      Problem: Adult Inpatient Plan of Care  Goal: Plan of Care Review  Description: The Plan of Care Review/Shift note should be completed every shift.  The Outcome Evaluation is a brief statement about your assessment that the patient is improving, declining, or no change.  This information will be displayed automatically on your shift  note.  Outcome: Progressing  Flowsheets (Taken 12/14/2024 1740)  Outcome Evaluation: Alert to self, denies pain, using anahi steady to the BR.  Plan of Care Reviewed With: patient  Overall Patient Progress: no change  Goal: Patient-Specific Goal (Individualized)  Description: You can add care plan individualizations to a care plan. Examples of Individualization might be:  \"Parent requests to be called daily at 9am for status\", \"I have a hard time hearing out of my right ear\", or \"Do not touch me to wake me up as it startles  me\".  Outcome: Progressing  Goal: Absence of Hospital-Acquired Illness or Injury  Outcome: Progressing  Intervention: Identify and Manage Fall Risk  Recent Flowsheet Documentation  Taken 12/14/2024 0900 by Shelby Garcia RN  Safety Promotion/Fall Prevention:   lighting adjusted   clutter free " environment maintained   activity supervised  Intervention: Prevent and Manage VTE (Venous Thromboembolism) Risk  Recent Flowsheet Documentation  Taken 12/14/2024 0900 by Shelby Garcia RN  VTE Prevention/Management: SCDs off (sequential compression devices)  Goal: Optimal Comfort and Wellbeing  Outcome: Progressing  Intervention: Monitor Pain and Promote Comfort  Recent Flowsheet Documentation  Taken 12/14/2024 0900 by Shelby Garcia RN  Pain Management Interventions: declines  Intervention: Provide Person-Centered Care  Recent Flowsheet Documentation  Taken 12/14/2024 0900 by Shelby Garcia RN  Trust Relationship/Rapport: care explained  Goal: Readiness for Transition of Care  Outcome: Progressing     Problem: Gastrointestinal Bleeding  Goal: Optimal Coping with Acute Illness  Outcome: Progressing  Goal: Hemostasis  Outcome: Progressing     Problem: Skin Injury Risk Increased  Goal: Skin Health and Integrity  Outcome: Progressing  Intervention: Plan: Nurse Driven Intervention: Moisture Management  Recent Flowsheet Documentation  Taken 12/14/2024 0800 by Shelby Garcia RN  Moisture Interventions: No brief in bed  Bathing/Skin Care: incontinence care     Problem: Fall Injury Risk  Goal: Absence of Fall and Fall-Related Injury  Outcome: Progressing  Intervention: Identify and Manage Contributors  Recent Flowsheet Documentation  Taken 12/14/2024 0900 by Shelby Garcia RN  Medication Review/Management: medications reviewed  Intervention: Promote Injury-Free Environment  Recent Flowsheet Documentation  Taken 12/14/2024 0900 by Shelby Garcia RN  Safety Promotion/Fall Prevention:   lighting adjusted   clutter free environment maintained   activity supervised

## 2024-12-14 NOTE — PLAN OF CARE
"Goal Outcome Evaluation:      Plan of Care Reviewed With: patient    Overall Patient Progress: no changeOverall Patient Progress: no change    Outcome Evaluation: Alert to self. Denies pain. 2L O2 NC. had 1 BM last 5 hours. Right dressing CDI.    Problem: Adult Inpatient Plan of Care  Goal: Plan of Care Review  Description: The Plan of Care Review/Shift note should be completed every shift.  The Outcome Evaluation is a brief statement about your assessment that the patient is improving, declining, or no change.  This information will be displayed automatically on your shift  note.  Outcome: Progressing  Flowsheets (Taken 12/13/2024 2224)  Outcome Evaluation: Alert to self. Denies pain. 2L O2 NC. had 1 BM last 5 hours. Right dressing CDI.  Plan of Care Reviewed With: patient  Overall Patient Progress: no change  Goal: Patient-Specific Goal (Individualized)  Description: You can add care plan individualizations to a care plan. Examples of Individualization might be:  \"Parent requests to be called daily at 9am for status\", \"I have a hard time hearing out of my right ear\", or \"Do not touch me to wake me up as it startles  me\".  Outcome: Progressing  Goal: Absence of Hospital-Acquired Illness or Injury  Outcome: Progressing  Intervention: Identify and Manage Fall Risk  Recent Flowsheet Documentation  Taken 12/13/2024 2027 by Tish Warner RN  Safety Promotion/Fall Prevention:   safety round/check completed   activity supervised  Intervention: Prevent and Manage VTE (Venous Thromboembolism) Risk  Recent Flowsheet Documentation  Taken 12/13/2024 2027 by Tish Warner RN  VTE Prevention/Management: SCDs off (sequential compression devices)  Goal: Optimal Comfort and Wellbeing  Outcome: Progressing  Goal: Readiness for Transition of Care  Outcome: Progressing     Problem: Gastrointestinal Bleeding  Goal: Optimal Coping with Acute Illness  Outcome: Progressing  Goal: Hemostasis  Outcome: Progressing     Problem: " Skin Injury Risk Increased  Goal: Skin Health and Integrity  Outcome: Progressing  Intervention: Plan: Nurse Driven Intervention: Moisture Management  Recent Flowsheet Documentation  Taken 12/13/2024 2027 by Tish Warner RN  Moisture Interventions: No brief in bed  Intervention: Plan: Nurse Driven Intervention: Friction and Shear  Recent Flowsheet Documentation  Taken 12/13/2024 2027 by Tish Warner RN  Friction/Shear Interventions: HOB 30 degrees or less     Problem: Fall Injury Risk  Goal: Absence of Fall and Fall-Related Injury  Outcome: Progressing  Intervention: Identify and Manage Contributors  Recent Flowsheet Documentation  Taken 12/13/2024 2027 by Tish Warner RN  Medication Review/Management: medications reviewed  Intervention: Promote Injury-Free Environment  Recent Flowsheet Documentation  Taken 12/13/2024 2027 by Tish Warner RN  Safety Promotion/Fall Prevention:   safety round/check completed   activity supervised
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
## 2024-12-14 NOTE — PLAN OF CARE
"Goal Outcome Evaluation:    End of Shift Summary  For vital signs and complete assessments, please see documentation flowsheets.     Pertinent assessments: Pt Alert to self. VSS and on RA. Tolerated diet and no nausea reported. Pt had 2 loose BM during the shift.  Denied pain. R hip incision with staples and bruise noted and inner thigh bruise noted. Dressing changed and CDI. Pt also came off comfort cares.    Major Shift Events : Came off Comfort cares    Treatment Plan: Monitor symptoms, pain management, pending discharge.  Bedside Nurse: Shelby Garcia RN         Plan of Care Reviewed With: patient    Overall Patient Progress: no changeOverall Patient Progress: no change    Outcome Evaluation: Denied pain and came off comfort cares.      Problem: Adult Inpatient Plan of Care  Goal: Plan of Care Review  Description: The Plan of Care Review/Shift note should be completed every shift.  The Outcome Evaluation is a brief statement about your assessment that the patient is improving, declining, or no change.  This information will be displayed automatically on your shift  note.  Outcome: Progressing  Flowsheets (Taken 12/13/2024 1908)  Outcome Evaluation: Denied pain and came off comfort cares.  Plan of Care Reviewed With: patient  Overall Patient Progress: no change  Goal: Patient-Specific Goal (Individualized)  Description: You can add care plan individualizations to a care plan. Examples of Individualization might be:  \"Parent requests to be called daily at 9am for status\", \"I have a hard time hearing out of my right ear\", or \"Do not touch me to wake me up as it startles  me\".  Outcome: Progressing  Goal: Absence of Hospital-Acquired Illness or Injury  Outcome: Progressing  Goal: Optimal Comfort and Wellbeing  Outcome: Progressing  Goal: Readiness for Transition of Care  Outcome: Progressing     Problem: Gastrointestinal Bleeding  Goal: Optimal Coping with Acute Illness  Outcome: Progressing  Goal: " Hemostasis  Outcome: Progressing     Problem: Skin Injury Risk Increased  Goal: Skin Health and Integrity  Outcome: Progressing  Intervention: Plan: Nurse Driven Intervention: Moisture Management  Recent Flowsheet Documentation  Taken 12/13/2024 0800 by Shelby Garcia RN  Moisture Interventions: No brief in bed  Bathing/Skin Care: incontinence care     Problem: Fall Injury Risk  Goal: Absence of Fall and Fall-Related Injury  Outcome: Progressing
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
## 2024-12-15 ENCOUNTER — APPOINTMENT (OUTPATIENT)
Dept: GENERAL RADIOLOGY | Facility: CLINIC | Age: 79
DRG: 871 | End: 2024-12-15
Attending: INTERNAL MEDICINE
Payer: COMMERCIAL

## 2024-12-15 LAB
ANION GAP SERPL CALCULATED.3IONS-SCNC: 13 MMOL/L (ref 7–15)
BACTERIA BLD CULT: NO GROWTH
BASOPHILS # BLD AUTO: 0 10E3/UL (ref 0–0.2)
BASOPHILS NFR BLD AUTO: 0 %
BUN SERPL-MCNC: 22.6 MG/DL (ref 8–23)
C PNEUM DNA SPEC QL NAA+PROBE: NOT DETECTED
CALCIUM SERPL-MCNC: 7.8 MG/DL (ref 8.8–10.4)
CHLORIDE SERPL-SCNC: 115 MMOL/L (ref 98–107)
CREAT SERPL-MCNC: 0.83 MG/DL (ref 0.67–1.17)
EGFRCR SERPLBLD CKD-EPI 2021: 90 ML/MIN/1.73M2
EOSINOPHIL # BLD AUTO: 0.1 10E3/UL (ref 0–0.7)
EOSINOPHIL NFR BLD AUTO: 1 %
ERYTHROCYTE [DISTWIDTH] IN BLOOD BY AUTOMATED COUNT: 15 % (ref 10–15)
FLUAV H1 2009 PAND RNA SPEC QL NAA+PROBE: NOT DETECTED
FLUAV H1 RNA SPEC QL NAA+PROBE: NOT DETECTED
FLUAV H3 RNA SPEC QL NAA+PROBE: NOT DETECTED
FLUAV RNA SPEC QL NAA+PROBE: NEGATIVE
FLUAV RNA SPEC QL NAA+PROBE: NOT DETECTED
FLUBV RNA RESP QL NAA+PROBE: NEGATIVE
FLUBV RNA SPEC QL NAA+PROBE: NOT DETECTED
GLUCOSE SERPL-MCNC: 121 MG/DL (ref 70–99)
HADV DNA SPEC QL NAA+PROBE: NOT DETECTED
HCO3 SERPL-SCNC: 23 MMOL/L (ref 22–29)
HCOV PNL SPEC NAA+PROBE: NOT DETECTED
HCT VFR BLD AUTO: 42.7 % (ref 40–53)
HGB BLD-MCNC: 13.3 G/DL (ref 13.3–17.7)
HMPV RNA SPEC QL NAA+PROBE: NOT DETECTED
HPIV1 RNA SPEC QL NAA+PROBE: NOT DETECTED
HPIV2 RNA SPEC QL NAA+PROBE: NOT DETECTED
HPIV3 RNA SPEC QL NAA+PROBE: NOT DETECTED
HPIV4 RNA SPEC QL NAA+PROBE: NOT DETECTED
IMM GRANULOCYTES # BLD: 0.1 10E3/UL
IMM GRANULOCYTES NFR BLD: 1 %
LYMPHOCYTES # BLD AUTO: 0.7 10E3/UL (ref 0.8–5.3)
LYMPHOCYTES NFR BLD AUTO: 6 %
M PNEUMO DNA SPEC QL NAA+PROBE: NOT DETECTED
MCH RBC QN AUTO: 28.3 PG (ref 26.5–33)
MCHC RBC AUTO-ENTMCNC: 31.1 G/DL (ref 31.5–36.5)
MCV RBC AUTO: 91 FL (ref 78–100)
MONOCYTES # BLD AUTO: 0.8 10E3/UL (ref 0–1.3)
MONOCYTES NFR BLD AUTO: 7 %
NEUTROPHILS # BLD AUTO: 10.5 10E3/UL (ref 1.6–8.3)
NEUTROPHILS NFR BLD AUTO: 86 %
NRBC # BLD AUTO: 0 10E3/UL
NRBC BLD AUTO-RTO: 0 /100
PLATELET # BLD AUTO: 159 10E3/UL (ref 150–450)
POTASSIUM SERPL-SCNC: 3.5 MMOL/L (ref 3.4–5.3)
PROCALCITONIN SERPL IA-MCNC: 2.5 NG/ML
RBC # BLD AUTO: 4.7 10E6/UL (ref 4.4–5.9)
RSV RNA SPEC NAA+PROBE: NEGATIVE
RSV RNA SPEC QL NAA+PROBE: NOT DETECTED
RSV RNA SPEC QL NAA+PROBE: NOT DETECTED
RV+EV RNA SPEC QL NAA+PROBE: NOT DETECTED
SARS-COV-2 RNA RESP QL NAA+PROBE: NEGATIVE
SODIUM SERPL-SCNC: 148 MMOL/L (ref 135–145)
SODIUM SERPL-SCNC: 151 MMOL/L (ref 135–145)
WBC # BLD AUTO: 12.2 10E3/UL (ref 4–11)

## 2024-12-15 PROCEDURE — 82565 ASSAY OF CREATININE: CPT | Performed by: INTERNAL MEDICINE

## 2024-12-15 PROCEDURE — 87040 BLOOD CULTURE FOR BACTERIA: CPT | Performed by: INTERNAL MEDICINE

## 2024-12-15 PROCEDURE — 84145 PROCALCITONIN (PCT): CPT | Performed by: INTERNAL MEDICINE

## 2024-12-15 PROCEDURE — 94640 AIRWAY INHALATION TREATMENT: CPT | Mod: 76

## 2024-12-15 PROCEDURE — 999N000157 HC STATISTIC RCP TIME EA 10 MIN

## 2024-12-15 PROCEDURE — 71045 X-RAY EXAM CHEST 1 VIEW: CPT

## 2024-12-15 PROCEDURE — 999N000156 HC STATISTIC RCP CONSULT EA 30 MIN

## 2024-12-15 PROCEDURE — 36415 COLL VENOUS BLD VENIPUNCTURE: CPT | Performed by: INTERNAL MEDICINE

## 2024-12-15 PROCEDURE — 85025 COMPLETE CBC W/AUTO DIFF WBC: CPT | Performed by: INTERNAL MEDICINE

## 2024-12-15 PROCEDURE — 120N000001 HC R&B MED SURG/OB

## 2024-12-15 PROCEDURE — 94640 AIRWAY INHALATION TREATMENT: CPT

## 2024-12-15 PROCEDURE — 87637 SARSCOV2&INF A&B&RSV AMP PRB: CPT | Performed by: INTERNAL MEDICINE

## 2024-12-15 PROCEDURE — 258N000003 HC RX IP 258 OP 636: Performed by: INTERNAL MEDICINE

## 2024-12-15 PROCEDURE — 250N000011 HC RX IP 250 OP 636: Performed by: INTERNAL MEDICINE

## 2024-12-15 PROCEDURE — 250N000009 HC RX 250: Performed by: INTERNAL MEDICINE

## 2024-12-15 PROCEDURE — 84295 ASSAY OF SERUM SODIUM: CPT | Performed by: INTERNAL MEDICINE

## 2024-12-15 PROCEDURE — 99233 SBSQ HOSP IP/OBS HIGH 50: CPT | Performed by: INTERNAL MEDICINE

## 2024-12-15 PROCEDURE — 82374 ASSAY BLOOD CARBON DIOXIDE: CPT | Performed by: INTERNAL MEDICINE

## 2024-12-15 PROCEDURE — 87633 RESP VIRUS 12-25 TARGETS: CPT | Performed by: INTERNAL MEDICINE

## 2024-12-15 PROCEDURE — 250N000013 HC RX MED GY IP 250 OP 250 PS 637: Performed by: INTERNAL MEDICINE

## 2024-12-15 PROCEDURE — 80048 BASIC METABOLIC PNL TOTAL CA: CPT | Performed by: INTERNAL MEDICINE

## 2024-12-15 RX ORDER — IPRATROPIUM BROMIDE AND ALBUTEROL SULFATE 2.5; .5 MG/3ML; MG/3ML
3 SOLUTION RESPIRATORY (INHALATION) EVERY 4 HOURS PRN
Status: DISCONTINUED | OUTPATIENT
Start: 2024-12-15 | End: 2024-12-17

## 2024-12-15 RX ORDER — DEXTROSE MONOHYDRATE 50 MG/ML
INJECTION, SOLUTION INTRAVENOUS CONTINUOUS
Status: DISCONTINUED | OUTPATIENT
Start: 2024-12-15 | End: 2024-12-15

## 2024-12-15 RX ORDER — DEXTROSE MONOHYDRATE 50 MG/ML
INJECTION, SOLUTION INTRAVENOUS CONTINUOUS
Status: DISCONTINUED | OUTPATIENT
Start: 2024-12-15 | End: 2024-12-16

## 2024-12-15 RX ORDER — IPRATROPIUM BROMIDE AND ALBUTEROL SULFATE 2.5; .5 MG/3ML; MG/3ML
3 SOLUTION RESPIRATORY (INHALATION)
Status: DISCONTINUED | OUTPATIENT
Start: 2024-12-15 | End: 2024-12-16

## 2024-12-15 RX ORDER — IPRATROPIUM BROMIDE AND ALBUTEROL SULFATE 2.5; .5 MG/3ML; MG/3ML
3 SOLUTION RESPIRATORY (INHALATION)
Status: DISCONTINUED | OUTPATIENT
Start: 2024-12-15 | End: 2024-12-15

## 2024-12-15 RX ORDER — PIPERACILLIN SODIUM, TAZOBACTAM SODIUM 4; .5 G/20ML; G/20ML
4.5 INJECTION, POWDER, LYOPHILIZED, FOR SOLUTION INTRAVENOUS EVERY 6 HOURS
Status: DISCONTINUED | OUTPATIENT
Start: 2024-12-15 | End: 2024-12-16

## 2024-12-15 RX ADMIN — IPRATROPIUM BROMIDE AND ALBUTEROL SULFATE 3 ML: .5; 3 SOLUTION RESPIRATORY (INHALATION) at 16:02

## 2024-12-15 RX ADMIN — PANTOPRAZOLE SODIUM 40 MG: 40 TABLET, DELAYED RELEASE ORAL at 17:17

## 2024-12-15 RX ADMIN — PANTOPRAZOLE SODIUM 40 MG: 40 TABLET, DELAYED RELEASE ORAL at 08:01

## 2024-12-15 RX ADMIN — DEXTROSE MONOHYDRATE: 50 INJECTION, SOLUTION INTRAVENOUS at 14:18

## 2024-12-15 RX ADMIN — IPRATROPIUM BROMIDE AND ALBUTEROL SULFATE 3 ML: .5; 3 SOLUTION RESPIRATORY (INHALATION) at 12:02

## 2024-12-15 RX ADMIN — PIPERACILLIN AND TAZOBACTAM 4.5 G: 4; .5 INJECTION, POWDER, FOR SOLUTION INTRAVENOUS at 13:24

## 2024-12-15 RX ADMIN — ATORVASTATIN CALCIUM 40 MG: 40 TABLET, FILM COATED ORAL at 20:38

## 2024-12-15 RX ADMIN — PIPERACILLIN AND TAZOBACTAM 4.5 G: 4; .5 INJECTION, POWDER, FOR SOLUTION INTRAVENOUS at 18:57

## 2024-12-15 RX ADMIN — IPRATROPIUM BROMIDE AND ALBUTEROL SULFATE 3 ML: .5; 3 SOLUTION RESPIRATORY (INHALATION) at 19:01

## 2024-12-15 ASSESSMENT — ACTIVITIES OF DAILY LIVING (ADL)
ADLS_ACUITY_SCORE: 59
ADLS_ACUITY_SCORE: 51
ADLS_ACUITY_SCORE: 51
ADLS_ACUITY_SCORE: 55
ADLS_ACUITY_SCORE: 51
ADLS_ACUITY_SCORE: 59
ADLS_ACUITY_SCORE: 51
ADLS_ACUITY_SCORE: 55
ADLS_ACUITY_SCORE: 51
ADLS_ACUITY_SCORE: 51
ADLS_ACUITY_SCORE: 55
ADLS_ACUITY_SCORE: 55
ADLS_ACUITY_SCORE: 51
ADLS_ACUITY_SCORE: 55
ADLS_ACUITY_SCORE: 51
ADLS_ACUITY_SCORE: 55

## 2024-12-15 NOTE — PLAN OF CARE
"To Do:    End of Shift Summary  For vital signs and complete assessments, please see documentation flowsheets.       Pertinent assessments: Pt Alert to self. Up to chair for breakfast. Oxymask set at 5 L. LS coarse. Denies pain. Using urinal in bed.     Major Shift Events : Uneventful  Treatment Plan: Symptom management, antibiotics, monitor respirations/ O2, pending discharge.    Bedside Nurse: Yoanna Delgado RN                           Goal Outcome Evaluation:         Problem: Adult Inpatient Plan of Care  Goal: Plan of Care Review  Description: The Plan of Care Review/Shift note should be completed every shift.  The Outcome Evaluation is a brief statement about your assessment that the patient is improving, declining, or no change.  This information will be displayed automatically on your shift  note.  Outcome: Not Progressing  Flowsheets (Taken 12/15/2024 1501)  Outcome Evaluation: Alert to self, able to communicate needs. Uses SaraSteady to transfer.  Goal: Patient-Specific Goal (Individualized)  Description: You can add care plan individualizations to a care plan. Examples of Individualization might be:  \"Parent requests to be called daily at 9am for status\", \"I have a hard time hearing out of my right ear\", or \"Do not touch me to wake me up as it startles  me\".  Outcome: Not Progressing  Goal: Absence of Hospital-Acquired Illness or Injury  Outcome: Not Progressing  Intervention: Identify and Manage Fall Risk  Recent Flowsheet Documentation  Taken 12/15/2024 0945 by Yoanna Delgado RN  Safety Promotion/Fall Prevention:   activity supervised   lighting adjusted   safety round/check completed   room near nurse's station  Intervention: Prevent Skin Injury  Recent Flowsheet Documentation  Taken 12/15/2024 0945 by Yoanna Delgado RN  Body Position: position changed independently  Intervention: Prevent and Manage VTE (Venous Thromboembolism) Risk  Recent Flowsheet Documentation  Taken 12/15/2024 " 0945 by Yoanna Delgado RN  VTE Prevention/Management: SCDs off (sequential compression devices)  Goal: Optimal Comfort and Wellbeing  Outcome: Not Progressing  Intervention: Provide Person-Centered Care  Recent Flowsheet Documentation  Taken 12/15/2024 0945 by Yoanna Delgado RN  Trust Relationship/Rapport:   reassurance provided   emotional support provided   empathic listening provided  Goal: Readiness for Transition of Care  Outcome: Not Progressing     Problem: Gastrointestinal Bleeding  Goal: Optimal Coping with Acute Illness  Outcome: Not Progressing  Goal: Hemostasis  Outcome: Not Progressing     Problem: Skin Injury Risk Increased  Goal: Skin Health and Integrity  Outcome: Not Progressing  Intervention: Plan: Nurse Driven Intervention: Moisture Management  Recent Flowsheet Documentation  Taken 12/15/2024 0945 by Yoanna Delgado RN  Moisture Interventions: Encourage regular toileting  Taken 12/15/2024 0800 by Yoanna Delgado RN  Bathing/Skin Care:   linen changed   incontinence care  Intervention: Plan: Nurse Driven Intervention: Friction and Shear  Recent Flowsheet Documentation  Taken 12/15/2024 0945 by Yoanna Delgado RN  Friction/Shear Interventions: HOB 30 degrees or less  Intervention: Optimize Skin Protection  Recent Flowsheet Documentation  Taken 12/15/2024 0945 by Yoanna Delgado RN  Activity Management: activity adjusted per tolerance  Head of Bed (HOB) Positioning: HOB at 20-30 degrees     Problem: Fall Injury Risk  Goal: Absence of Fall and Fall-Related Injury  Outcome: Not Progressing  Intervention: Identify and Manage Contributors  Recent Flowsheet Documentation  Taken 12/15/2024 0945 by Yoanna Delgado RN  Medication Review/Management: medications reviewed  Intervention: Promote Injury-Free Environment  Recent Flowsheet Documentation  Taken 12/15/2024 0945 by Yoanna Delgado RN  Safety Promotion/Fall Prevention:   activity supervised   lighting  adjusted   safety round/check completed   room near nurse's station             Outcome Evaluation: Alert to self, able to communicate needs. Uses SaraSteady to transfer.
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
## 2024-12-15 NOTE — PLAN OF CARE
Pertinent assessments: Pt Alert to self. Not out of bed this shift. VSS. 5Loxymask. LS coarse w/ crackles. Congested cough. Denies pain. Incision dressing CDI. Using urinal in bed.     Major Shift Events : Uneventful  Treatment Plan: Monitor symptoms, pain management, pending discharge.  Bedside Nurse: Melissa Holm RN

## 2024-12-15 NOTE — PROGRESS NOTES
12/15/24 1202   RCAT Assessment   Reason for Assessment COPD   Pulmonary Status 3   Surgical Status 0   Chest X-ray 2   Respiratory Pattern 1   Mental Status 0   Breath Sounds 2   Cough Effectiveness 2   Level of Activity 1   O2 Required for SpO2>=92% 0   Acuity Level (points) 11   Acuity Level  3   Re-eval Interval Guideline Every 3 days   Re-evaluation Date 12/18/24   Clinical Indications/Symptoms   Aerosol Therapy Physician order;RCAT protocol   Broncho-pulmonary Hygiene History of mucous producing disease   Volume Expansion Decreased breath sounds   Aerosol Therapy Plan   RT Treatment Nebulizer   Anticholinergic/Beta-Andrenergic Agonist Duoneb soln (0.5mg/3mg per 3mL) neb Max 6 doses/24h   Aerosol Treatment Frequency Acuity Level 3: QID/PRN @noc-Mod wheezing/Hx asthma/secretion removal   Broncho-Pulmonary Hygiene Plan   Broncho-Pulmonary Hygiene Treatment OPEP therapy   Broncho-Pulm Hygiene Frequency Acuity Level 3: TID-Small amounts secretions/poor cough, hx of secretions   Volume Expansion Plan   Volume Expansion Treatment Incentive Spirometer   Volume Expansion Frequency Acuity Level 3: TID-At risk for developing atelectasis   Breath Sounds   Breath Sounds All Fields   All Lung Fields Breath Sounds Anterior:;coarse;diminished         Migue Silver, RT

## 2024-12-16 ENCOUNTER — APPOINTMENT (OUTPATIENT)
Dept: SPEECH THERAPY | Facility: CLINIC | Age: 79
DRG: 871 | End: 2024-12-16
Attending: INTERNAL MEDICINE
Payer: COMMERCIAL

## 2024-12-16 LAB
ANION GAP SERPL CALCULATED.3IONS-SCNC: 12 MMOL/L (ref 7–15)
BUN SERPL-MCNC: 20.9 MG/DL (ref 8–23)
CALCIUM SERPL-MCNC: 7.6 MG/DL (ref 8.8–10.4)
CHLORIDE SERPL-SCNC: 112 MMOL/L (ref 98–107)
CREAT SERPL-MCNC: 0.95 MG/DL (ref 0.67–1.17)
EGFRCR SERPLBLD CKD-EPI 2021: 82 ML/MIN/1.73M2
ERYTHROCYTE [DISTWIDTH] IN BLOOD BY AUTOMATED COUNT: 15.1 % (ref 10–15)
GLUCOSE SERPL-MCNC: 213 MG/DL (ref 70–99)
HCO3 SERPL-SCNC: 24 MMOL/L (ref 22–29)
HCT VFR BLD AUTO: 38.7 % (ref 40–53)
HGB BLD-MCNC: 11.9 G/DL (ref 13.3–17.7)
MCH RBC QN AUTO: 28.1 PG (ref 26.5–33)
MCHC RBC AUTO-ENTMCNC: 30.7 G/DL (ref 31.5–36.5)
MCV RBC AUTO: 92 FL (ref 78–100)
PLATELET # BLD AUTO: 153 10E3/UL (ref 150–450)
POTASSIUM SERPL-SCNC: 3 MMOL/L (ref 3.4–5.3)
RBC # BLD AUTO: 4.23 10E6/UL (ref 4.4–5.9)
SODIUM SERPL-SCNC: 148 MMOL/L (ref 135–145)
WBC # BLD AUTO: 9.3 10E3/UL (ref 4–11)

## 2024-12-16 PROCEDURE — 120N000001 HC R&B MED SURG/OB

## 2024-12-16 PROCEDURE — 99233 SBSQ HOSP IP/OBS HIGH 50: CPT | Performed by: INTERNAL MEDICINE

## 2024-12-16 PROCEDURE — 250N000013 HC RX MED GY IP 250 OP 250 PS 637: Performed by: INTERNAL MEDICINE

## 2024-12-16 PROCEDURE — 999N000157 HC STATISTIC RCP TIME EA 10 MIN

## 2024-12-16 PROCEDURE — 80048 BASIC METABOLIC PNL TOTAL CA: CPT | Performed by: INTERNAL MEDICINE

## 2024-12-16 PROCEDURE — 250N000011 HC RX IP 250 OP 636: Performed by: INTERNAL MEDICINE

## 2024-12-16 PROCEDURE — 258N000003 HC RX IP 258 OP 636: Performed by: INTERNAL MEDICINE

## 2024-12-16 PROCEDURE — 99222 1ST HOSP IP/OBS MODERATE 55: CPT | Performed by: NURSE PRACTITIONER

## 2024-12-16 PROCEDURE — 92610 EVALUATE SWALLOWING FUNCTION: CPT | Mod: GN | Performed by: SPEECH-LANGUAGE PATHOLOGIST

## 2024-12-16 PROCEDURE — 250N000011 HC RX IP 250 OP 636: Mod: JZ | Performed by: NURSE PRACTITIONER

## 2024-12-16 PROCEDURE — G0463 HOSPITAL OUTPT CLINIC VISIT: HCPCS

## 2024-12-16 PROCEDURE — 85014 HEMATOCRIT: CPT | Performed by: INTERNAL MEDICINE

## 2024-12-16 PROCEDURE — 94640 AIRWAY INHALATION TREATMENT: CPT

## 2024-12-16 PROCEDURE — 94640 AIRWAY INHALATION TREATMENT: CPT | Mod: 76

## 2024-12-16 PROCEDURE — 92526 ORAL FUNCTION THERAPY: CPT | Mod: GN | Performed by: SPEECH-LANGUAGE PATHOLOGIST

## 2024-12-16 PROCEDURE — 36415 COLL VENOUS BLD VENIPUNCTURE: CPT | Performed by: INTERNAL MEDICINE

## 2024-12-16 PROCEDURE — 250N000009 HC RX 250: Performed by: INTERNAL MEDICINE

## 2024-12-16 RX ORDER — NALOXONE HYDROCHLORIDE 0.4 MG/ML
0.2 INJECTION, SOLUTION INTRAMUSCULAR; INTRAVENOUS; SUBCUTANEOUS
Status: DISCONTINUED | OUTPATIENT
Start: 2024-12-16 | End: 2024-12-20 | Stop reason: HOSPADM

## 2024-12-16 RX ORDER — MINERAL OIL/HYDROPHIL PETROLAT
OINTMENT (GRAM) TOPICAL
Status: DISCONTINUED | OUTPATIENT
Start: 2024-12-16 | End: 2024-12-20 | Stop reason: HOSPADM

## 2024-12-16 RX ORDER — SENNOSIDES 8.6 MG
1 TABLET ORAL 2 TIMES DAILY PRN
Status: DISCONTINUED | OUTPATIENT
Start: 2024-12-16 | End: 2024-12-18

## 2024-12-16 RX ORDER — LORAZEPAM 1 MG/1
1 TABLET ORAL
Status: DISCONTINUED | OUTPATIENT
Start: 2024-12-16 | End: 2024-12-20 | Stop reason: HOSPADM

## 2024-12-16 RX ORDER — MORPHINE SULFATE 20 MG/ML
10 SOLUTION ORAL
Status: DISCONTINUED | OUTPATIENT
Start: 2024-12-16 | End: 2024-12-20 | Stop reason: HOSPADM

## 2024-12-16 RX ORDER — IPRATROPIUM BROMIDE AND ALBUTEROL SULFATE 2.5; .5 MG/3ML; MG/3ML
3 SOLUTION RESPIRATORY (INHALATION) EVERY 4 HOURS PRN
Status: DISCONTINUED | OUTPATIENT
Start: 2024-12-16 | End: 2024-12-20 | Stop reason: HOSPADM

## 2024-12-16 RX ORDER — POTASSIUM CHLORIDE 1.5 G/1.58G
20 POWDER, FOR SOLUTION ORAL ONCE
Status: COMPLETED | OUTPATIENT
Start: 2024-12-16 | End: 2024-12-16

## 2024-12-16 RX ORDER — ATROPINE SULFATE 10 MG/ML
2 SOLUTION/ DROPS OPHTHALMIC EVERY 4 HOURS PRN
Status: DISCONTINUED | OUTPATIENT
Start: 2024-12-16 | End: 2024-12-20 | Stop reason: HOSPADM

## 2024-12-16 RX ORDER — MORPHINE SULFATE 2 MG/ML
2 INJECTION, SOLUTION INTRAMUSCULAR; INTRAVENOUS
Status: DISCONTINUED | OUTPATIENT
Start: 2024-12-16 | End: 2024-12-18

## 2024-12-16 RX ORDER — HALOPERIDOL 5 MG/ML
1 INJECTION INTRAMUSCULAR
Status: DISCONTINUED | OUTPATIENT
Start: 2024-12-16 | End: 2024-12-18

## 2024-12-16 RX ORDER — DOCUSATE SODIUM 100 MG/1
100 CAPSULE, LIQUID FILLED ORAL 2 TIMES DAILY PRN
Status: DISCONTINUED | OUTPATIENT
Start: 2024-12-16 | End: 2024-12-20 | Stop reason: HOSPADM

## 2024-12-16 RX ORDER — GLYCOPYRROLATE 0.2 MG/ML
0.2 INJECTION, SOLUTION INTRAMUSCULAR; INTRAVENOUS EVERY 4 HOURS PRN
Status: DISCONTINUED | OUTPATIENT
Start: 2024-12-16 | End: 2024-12-18

## 2024-12-16 RX ORDER — POTASSIUM CHLORIDE 1.5 G/1.58G
40 POWDER, FOR SOLUTION ORAL ONCE
Status: COMPLETED | OUTPATIENT
Start: 2024-12-16 | End: 2024-12-16

## 2024-12-16 RX ORDER — MORPHINE SULFATE 20 MG/ML
5 SOLUTION ORAL
Status: DISCONTINUED | OUTPATIENT
Start: 2024-12-16 | End: 2024-12-20 | Stop reason: HOSPADM

## 2024-12-16 RX ORDER — NALOXONE HYDROCHLORIDE 0.4 MG/ML
0.1 INJECTION, SOLUTION INTRAMUSCULAR; INTRAVENOUS; SUBCUTANEOUS
Status: DISCONTINUED | OUTPATIENT
Start: 2024-12-16 | End: 2024-12-20 | Stop reason: HOSPADM

## 2024-12-16 RX ORDER — BISACODYL 10 MG
10 SUPPOSITORY, RECTAL RECTAL
Status: DISCONTINUED | OUTPATIENT
Start: 2024-12-19 | End: 2024-12-20 | Stop reason: HOSPADM

## 2024-12-16 RX ORDER — LORAZEPAM 2 MG/ML
1 INJECTION INTRAMUSCULAR
Status: DISCONTINUED | OUTPATIENT
Start: 2024-12-16 | End: 2024-12-18

## 2024-12-16 RX ORDER — CARBOXYMETHYLCELLULOSE SODIUM 5 MG/ML
1-2 SOLUTION/ DROPS OPHTHALMIC
Status: DISCONTINUED | OUTPATIENT
Start: 2024-12-16 | End: 2024-12-20 | Stop reason: HOSPADM

## 2024-12-16 RX ORDER — MORPHINE SULFATE 2 MG/ML
1 INJECTION, SOLUTION INTRAMUSCULAR; INTRAVENOUS
Status: DISCONTINUED | OUTPATIENT
Start: 2024-12-16 | End: 2024-12-18

## 2024-12-16 RX ADMIN — MORPHINE SULFATE 2 MG: 2 INJECTION, SOLUTION INTRAMUSCULAR; INTRAVENOUS at 16:21

## 2024-12-16 RX ADMIN — DOCUSATE SODIUM 100 MG: 100 CAPSULE, LIQUID FILLED ORAL at 08:42

## 2024-12-16 RX ADMIN — PANTOPRAZOLE SODIUM 40 MG: 40 INJECTION, POWDER, FOR SOLUTION INTRAVENOUS at 20:39

## 2024-12-16 RX ADMIN — POTASSIUM CHLORIDE 20 MEQ: 1.5 POWDER, FOR SOLUTION ORAL at 11:48

## 2024-12-16 RX ADMIN — METOPROLOL SUCCINATE 25 MG: 25 TABLET, EXTENDED RELEASE ORAL at 08:41

## 2024-12-16 RX ADMIN — MORPHINE SULFATE 1 MG: 2 INJECTION, SOLUTION INTRAMUSCULAR; INTRAVENOUS at 15:52

## 2024-12-16 RX ADMIN — PANTOPRAZOLE SODIUM 40 MG: 40 TABLET, DELAYED RELEASE ORAL at 08:41

## 2024-12-16 RX ADMIN — IPRATROPIUM BROMIDE AND ALBUTEROL SULFATE 3 ML: .5; 3 SOLUTION RESPIRATORY (INHALATION) at 11:50

## 2024-12-16 RX ADMIN — IPRATROPIUM BROMIDE AND ALBUTEROL SULFATE 3 ML: .5; 3 SOLUTION RESPIRATORY (INHALATION) at 08:27

## 2024-12-16 RX ADMIN — PIPERACILLIN AND TAZOBACTAM 4.5 G: 4; .5 INJECTION, POWDER, FOR SOLUTION INTRAVENOUS at 02:24

## 2024-12-16 RX ADMIN — DEXTROSE MONOHYDRATE: 50 INJECTION, SOLUTION INTRAVENOUS at 09:49

## 2024-12-16 RX ADMIN — POTASSIUM CHLORIDE 40 MEQ: 1.5 POWDER, FOR SOLUTION ORAL at 09:52

## 2024-12-16 RX ADMIN — PIPERACILLIN AND TAZOBACTAM 4.5 G: 4; .5 INJECTION, POWDER, FOR SOLUTION INTRAVENOUS at 08:42

## 2024-12-16 ASSESSMENT — ACTIVITIES OF DAILY LIVING (ADL)
ADLS_ACUITY_SCORE: 59
ADLS_ACUITY_SCORE: 55
ADLS_ACUITY_SCORE: 59
ADLS_ACUITY_SCORE: 59
ADLS_ACUITY_SCORE: 57
ADLS_ACUITY_SCORE: 57
ADLS_ACUITY_SCORE: 59
ADLS_ACUITY_SCORE: 57
ADLS_ACUITY_SCORE: 57
ADLS_ACUITY_SCORE: 59
ADLS_ACUITY_SCORE: 55
ADLS_ACUITY_SCORE: 59
ADLS_ACUITY_SCORE: 57
ADLS_ACUITY_SCORE: 59
ADLS_ACUITY_SCORE: 57
ADLS_ACUITY_SCORE: 59
ADLS_ACUITY_SCORE: 57
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
## 2024-12-16 NOTE — PROGRESS NOTES
Called to patients room due to sudden hematemesis of large amount.   After discussion with palliative care and me he clearly wants to be comfort care, he does not want blood or EGD.   Contacted his brother who helps with his decision making and he also supports move back to comfort care.  Comfort care orders will be placed, no lab/IVF/antibiotics to continue.
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
## 2024-12-16 NOTE — PLAN OF CARE
"End of Shift Summary  For vital signs and complete assessments, please see documentation flowsheets.     Pertinent assessments: Pt alert to self. VSS on 5L oxymask. Denies pain. Voiding in urinal at bedside    Major Shift Events : None    Treatment Plan: Zosyn IVF, nebs,monitor respiratory symptoms, pending discharge.    Bedside Nurse: Merissa Cash RN         Problem: Adult Inpatient Plan of Care  Goal: Plan of Care Review  Description: The Plan of Care Review/Shift note should be completed every shift.  The Outcome Evaluation is a brief statement about your assessment that the patient is improving, declining, or no change.  This information will be displayed automatically on your shift  note.  Outcome: Progressing  Flowsheets (Taken 12/16/2024 0648)  Outcome Evaluation: Cooperative/pleasant, denies pain, 5lpm sats mid to high 90's  Plan of Care Reviewed With: patient  Overall Patient Progress: improving  Goal: Patient-Specific Goal (Individualized)  Description: You can add care plan individualizations to a care plan. Examples of Individualization might be:  \"Parent requests to be called daily at 9am for status\", \"I have a hard time hearing out of my right ear\", or \"Do not touch me to wake me up as it startles  me\".  Outcome: Progressing  Goal: Absence of Hospital-Acquired Illness or Injury  Outcome: Progressing  Intervention: Identify and Manage Fall Risk  Recent Flowsheet Documentation  Taken 12/15/2024 2100 by Merissa Cash RN  Safety Promotion/Fall Prevention:   activity supervised   lighting adjusted   safety round/check completed   room near nurse's station  Intervention: Prevent Skin Injury  Recent Flowsheet Documentation  Taken 12/15/2024 2100 by Merissa Cash RN  Body Position: position changed independently  Intervention: Prevent and Manage VTE (Venous Thromboembolism) Risk  Recent Flowsheet Documentation  Taken 12/15/2024 2100 by Merissa Cash RN  VTE Prevention/Management: SCDs off " (sequential compression devices)  Goal: Optimal Comfort and Wellbeing  Outcome: Progressing  Goal: Readiness for Transition of Care  Outcome: Progressing     Problem: Gastrointestinal Bleeding  Goal: Optimal Coping with Acute Illness  Outcome: Progressing  Goal: Hemostasis  Outcome: Progressing     Problem: Skin Injury Risk Increased  Goal: Skin Health and Integrity  Outcome: Progressing  Intervention: Plan: Nurse Driven Intervention: Moisture Management  Recent Flowsheet Documentation  Taken 12/15/2024 2100 by Merissa Cash RN  Moisture Interventions: Encourage regular toileting  Intervention: Plan: Nurse Driven Intervention: Friction and Shear  Recent Flowsheet Documentation  Taken 12/15/2024 2100 by Merissa Cash RN  Friction/Shear Interventions: HOB 30 degrees or less  Intervention: Optimize Skin Protection  Recent Flowsheet Documentation  Taken 12/15/2024 2100 by Merissa Cash RN  Activity Management: activity adjusted per tolerance  Head of Bed (HOB) Positioning: HOB at 20-30 degrees     Problem: Fall Injury Risk  Goal: Absence of Fall and Fall-Related Injury  Outcome: Progressing  Intervention: Identify and Manage Contributors  Recent Flowsheet Documentation  Taken 12/15/2024 2100 by Merissa Cash RN  Medication Review/Management: medications reviewed  Intervention: Promote Injury-Free Environment  Recent Flowsheet Documentation  Taken 12/15/2024 2100 by Merissa Cash RN  Safety Promotion/Fall Prevention:   activity supervised   lighting adjusted   safety round/check completed   room near nurse's station   Goal Outcome Evaluation:      Plan of Care Reviewed With: patient    Overall Patient Progress: improvingOverall Patient Progress: improving    Outcome Evaluation: Cooperative/pleasant, denies pain, 5lpm sats mid to high 90's

## 2024-12-16 NOTE — PROGRESS NOTES
RT Note: Patient's code status has changed to comfort cares. I went in to talk with patient if he wanted scheduled duoneb. He refused, due to having too much pain and discomfort. Duonebs were changed to Q4prn. Komal Godoy, RT on 12/16/2024 at 4:08 PM
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
## 2024-12-16 NOTE — PLAN OF CARE
"Goal Outcome Evaluation:    End of Shift Summary  For vital signs and complete assessments, please see documentation flowsheets.     Pertinent assessments: Pt alert to self. Vss and on 5L oxymask. Had a poor appetite. Denied pain. Ax2 with anahi steady.    Major Shift Events :Started on zosyn    Treatment Plan: Zosyn IVF, nebs,monitor respiratory symptoms, pending discharge.  Bedside Nurse: Shelby Garcia RN       Plan of Care Reviewed With: patient    Overall Patient Progress: no changeOverall Patient Progress: no change    Outcome Evaluation: Alert to self. Denies pain; on 5L Oxymask.      Problem: Adult Inpatient Plan of Care  Goal: Plan of Care Review  Description: The Plan of Care Review/Shift note should be completed every shift.  The Outcome Evaluation is a brief statement about your assessment that the patient is improving, declining, or no change.  This information will be displayed automatically on your shift  note.  Outcome: Not Progressing  Flowsheets (Taken 12/15/2024 6849)  Outcome Evaluation:   Alert to self. Denies pain   on 5L Oxymask.  Plan of Care Reviewed With: patient  Overall Patient Progress: no change  Goal: Patient-Specific Goal (Individualized)  Description: You can add care plan individualizations to a care plan. Examples of Individualization might be:  \"Parent requests to be called daily at 9am for status\", \"I have a hard time hearing out of my right ear\", or \"Do not touch me to wake me up as it startles  me\".  Outcome: Not Progressing  Goal: Absence of Hospital-Acquired Illness or Injury  Outcome: Not Progressing  Goal: Optimal Comfort and Wellbeing  Outcome: Not Progressing  Goal: Readiness for Transition of Care  Outcome: Not Progressing     Problem: Gastrointestinal Bleeding  Goal: Optimal Coping with Acute Illness  Outcome: Not Progressing  Goal: Hemostasis  Outcome: Not Progressing     Problem: Skin Injury Risk Increased  Goal: Skin Health and Integrity  Outcome: Not " Progressing     Problem: Fall Injury Risk  Goal: Absence of Fall and Fall-Related Injury  Outcome: Not Progressing

## 2024-12-16 NOTE — PROGRESS NOTES
Patient without new complaints this AM.  Denied any significant pain or worsening SOB.  He does not feel hungry but was willing to drink some liquids.  He appears to struggle with thin liquids and speech confirmed dysphagia concern.  He does not want thickened liquids though.   Palliative care already consulted today to assist patient/family with GOC as patient with ongoing aspiration concern, poor intake requiring D5W IVF to maintain sodium balance, ongoing severe deconditioning.    If he doesn't take more intake, he cannot discharge as still requiring IVF.  If he takes more intake but refuses thickened liquid he will eventually develop worsening pneumonia/aspiration and worsening Resp Failure.   Appreciate palliative care team assistance.  Full prog note to follow.
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
## 2024-12-17 PROCEDURE — 250N000013 HC RX MED GY IP 250 OP 250 PS 637: Performed by: INTERNAL MEDICINE

## 2024-12-17 PROCEDURE — 250N000013 HC RX MED GY IP 250 OP 250 PS 637: Performed by: HOSPITALIST

## 2024-12-17 PROCEDURE — 250N000009 HC RX 250: Performed by: INTERNAL MEDICINE

## 2024-12-17 PROCEDURE — 99232 SBSQ HOSP IP/OBS MODERATE 35: CPT | Performed by: INTERNAL MEDICINE

## 2024-12-17 PROCEDURE — 99232 SBSQ HOSP IP/OBS MODERATE 35: CPT | Performed by: NURSE PRACTITIONER

## 2024-12-17 PROCEDURE — 120N000001 HC R&B MED SURG/OB

## 2024-12-17 RX ORDER — PANTOPRAZOLE SODIUM 40 MG/1
40 TABLET, DELAYED RELEASE ORAL
Status: DISCONTINUED | OUTPATIENT
Start: 2024-12-17 | End: 2024-12-19

## 2024-12-17 RX ADMIN — PANTOPRAZOLE SODIUM 40 MG: 40 TABLET, DELAYED RELEASE ORAL at 20:44

## 2024-12-17 RX ADMIN — PANTOPRAZOLE SODIUM 40 MG: 40 INJECTION, POWDER, FOR SOLUTION INTRAVENOUS at 09:48

## 2024-12-17 ASSESSMENT — ACTIVITIES OF DAILY LIVING (ADL)
ADLS_ACUITY_SCORE: 61
ADLS_ACUITY_SCORE: 57
ADLS_ACUITY_SCORE: 56
ADLS_ACUITY_SCORE: 57
ADLS_ACUITY_SCORE: 61
ADLS_ACUITY_SCORE: 57
ADLS_ACUITY_SCORE: 56
ADLS_ACUITY_SCORE: 57
ADLS_ACUITY_SCORE: 56
ADLS_ACUITY_SCORE: 57
ADLS_ACUITY_SCORE: 56
ADLS_ACUITY_SCORE: 56
ADLS_ACUITY_SCORE: 57
ADLS_ACUITY_SCORE: 61
ADLS_ACUITY_SCORE: 57
ADLS_ACUITY_SCORE: 56
ADLS_ACUITY_SCORE: 56

## 2024-12-17 NOTE — PLAN OF CARE
"End of Shift Summary  For vital signs and complete assessments, please see documentation flowsheets.     Pertinent assessments: No c/o pain. 2L NC. Urinal at bedside. No nausea or vomiting. Comfort cares.     Major Shift Events: uneventful     Treatment Plan: comfort cares, await safe discharge     Goal Outcome Evaluation:      Plan of Care Reviewed With: patient    Overall Patient Progress: decliningOverall Patient Progress: declining    Outcome Evaluation: comfort cares      Problem: Adult Inpatient Plan of Care  Goal: Plan of Care Review  Description: The Plan of Care Review/Shift note should be completed every shift.  The Outcome Evaluation is a brief statement about your assessment that the patient is improving, declining, or no change.  This information will be displayed automatically on your shift  note.  Outcome: Progressing  Flowsheets (Taken 12/17/2024 0620)  Outcome Evaluation: comfort cares  Plan of Care Reviewed With: patient  Overall Patient Progress: declining  Goal: Patient-Specific Goal (Individualized)  Description: You can add care plan individualizations to a care plan. Examples of Individualization might be:  \"Parent requests to be called daily at 9am for status\", \"I have a hard time hearing out of my right ear\", or \"Do not touch me to wake me up as it startles  me\".  Outcome: Progressing  Goal: Absence of Hospital-Acquired Illness or Injury  Outcome: Progressing  Intervention: Identify and Manage Fall Risk  Recent Flowsheet Documentation  Taken 12/16/2024 2039 by Quyen Montes, RN  Safety Promotion/Fall Prevention:   activity supervised   assistive device/personal items within reach   nonskid shoes/slippers when out of bed   safety round/check completed  Intervention: Prevent and Manage VTE (Venous Thromboembolism) Risk  Recent Flowsheet Documentation  Taken 12/16/2024 2039 by Quyen Montes, RN  VTE Prevention/Management:   SCDs off (sequential compression devices)   patient refused " intervention  Intervention: Prevent Infection  Recent Flowsheet Documentation  Taken 12/16/2024 2039 by Quyen Montes RN  Infection Prevention:   hand hygiene promoted   single patient room provided  Goal: Optimal Comfort and Wellbeing  Outcome: Progressing  Goal: Readiness for Transition of Care  Outcome: Progressing     Problem: Gastrointestinal Bleeding  Goal: Optimal Coping with Acute Illness  Outcome: Progressing  Goal: Hemostasis  Outcome: Progressing     Problem: Skin Injury Risk Increased  Goal: Skin Health and Integrity  Outcome: Progressing  Intervention: Plan: Nurse Driven Intervention: Moisture Management  Recent Flowsheet Documentation  Taken 12/16/2024 2039 by Quyen Montes RN  Moisture Interventions: Incontinence pad  Intervention: Plan: Nurse Driven Intervention: Friction and Shear  Recent Flowsheet Documentation  Taken 12/16/2024 2039 by Quyen Montes RN  Friction/Shear Interventions: HOB 30 degrees or less     Problem: Fall Injury Risk  Goal: Absence of Fall and Fall-Related Injury  Outcome: Progressing  Intervention: Promote Injury-Free Environment  Recent Flowsheet Documentation  Taken 12/16/2024 2039 by Quyen Montes RN  Safety Promotion/Fall Prevention:   activity supervised   assistive device/personal items within reach   nonskid shoes/slippers when out of bed   safety round/check completed

## 2024-12-17 NOTE — PLAN OF CARE
Speech Language Therapy Discharge Summary    Reason for therapy discharge:    Change in medical status. Transitioned to comfort cares    Progress towards therapy goal(s). See goals on Care Plan in Epic electronic health record for goal details.  Goals not met.  Barriers to achieving goals:   change in goals of care.    Therapy recommendation(s):    No further therapy is recommended.
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
## 2024-12-17 NOTE — PLAN OF CARE
Occupational Therapy Discharge Summary    Reason for therapy discharge:    Change in medical status.    Progress towards therapy goal(s). See goals on Care Plan in [facility] electronic health record for goal details.  Pt has transitioned to comfort cares.     Therapy recommendation(s):    No further therapy is recommended.

## 2024-12-17 NOTE — PROGRESS NOTES
CLINICAL NUTRITION SERVICES    Chart check to monitor intake and POC.   Informed decision made to change pt s status to comfort care. Noted diet order was clear liquid, SLP recommended Regular Diet with Thin Liquids on 12/16 - pt understanding risks of consuming thin liquids. Changed diet order and ordered strawberry ensure per pt request.     No further interventions planned at this time. RD can be consulted if needed.    RD signing off on 12/17/2024.

## 2024-12-17 NOTE — PLAN OF CARE
Physical Therapy Discharge Summary    Reason for therapy discharge:    Change in medical status. Transition to comfort cares.    Progress towards therapy goal(s). See goals on Care Plan in [facility] electronic health record for goal details.  Goals not met.  Barriers to achieving goals:   limited tolerance for therapy.    Therapy recommendation(s):    No further therapy is recommended.

## 2024-12-17 NOTE — PLAN OF CARE
"Pertinent assessments: No c/o pain. 2L NC. Urinal at bedside. No nausea or vomiting. Comfort cares. Up with assist of 2  PIV, Regular diet    Problem: Adult Inpatient Plan of Care  Goal: Plan of Care Review  Description: The Plan of Care Review/Shift note should be completed every shift.  The Outcome Evaluation is a brief statement about your assessment that the patient is improving, declining, or no change.  This information will be displayed automatically on your shift  note.  Outcome: Not Progressing  Flowsheets (Taken 12/17/2024 4004)  Plan of Care Reviewed With: family  Overall Patient Progress: no change  Goal: Patient-Specific Goal (Individualized)  Description: You can add care plan individualizations to a care plan. Examples of Individualization might be:  \"Parent requests to be called daily at 9am for status\", \"I have a hard time hearing out of my right ear\", or \"Do not touch me to wake me up as it startles  me\".  Outcome: Not Progressing  Goal: Absence of Hospital-Acquired Illness or Injury  Outcome: Not Progressing  Goal: Optimal Comfort and Wellbeing  Outcome: Not Progressing  Goal: Readiness for Transition of Care  Outcome: Not Progressing     Problem: Gastrointestinal Bleeding  Goal: Optimal Coping with Acute Illness  Outcome: Not Progressing  Goal: Hemostasis  Outcome: Not Progressing     Problem: Skin Injury Risk Increased  Goal: Skin Health and Integrity  Outcome: Not Progressing     Problem: Fall Injury Risk  Goal: Absence of Fall and Fall-Related Injury  Outcome: Not Progressing   Goal Outcome Evaluation:      Plan of Care Reviewed With: family    Overall Patient Progress: no changeOverall Patient Progress: no change           "

## 2024-12-17 NOTE — PLAN OF CARE
"Goal Outcome Evaluation:       Comfort cares maintained. Gave IV morphine for pain control, on 2 L NC for comfort.     Overall Patient Progress: decliningOverall Patient Progress: declining    Outcome Evaluation: comfort maintained      Problem: Adult Inpatient Plan of Care  Goal: Plan of Care Review  Description: The Plan of Care Review/Shift note should be completed every shift.  The Outcome Evaluation is a brief statement about your assessment that the patient is improving, declining, or no change.  This information will be displayed automatically on your shift  note.  Outcome: Progressing  Flowsheets (Taken 12/16/2024 6952)  Outcome Evaluation: comfort maintained  Overall Patient Progress: declining  Goal: Patient-Specific Goal (Individualized)  Description: You can add care plan individualizations to a care plan. Examples of Individualization might be:  \"Parent requests to be called daily at 9am for status\", \"I have a hard time hearing out of my right ear\", or \"Do not touch me to wake me up as it startles  me\".  Outcome: Progressing  Goal: Absence of Hospital-Acquired Illness or Injury  Outcome: Progressing  Goal: Optimal Comfort and Wellbeing  Outcome: Progressing  Goal: Readiness for Transition of Care  Outcome: Progressing     Problem: Gastrointestinal Bleeding  Goal: Optimal Coping with Acute Illness  Outcome: Progressing  Goal: Hemostasis  Outcome: Progressing     Problem: Skin Injury Risk Increased  Goal: Skin Health and Integrity  Outcome: Progressing     Problem: Fall Injury Risk  Goal: Absence of Fall and Fall-Related Injury  Outcome: Progressing     "
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
## 2024-12-18 VITALS
HEART RATE: 70 BPM | DIASTOLIC BLOOD PRESSURE: 55 MMHG | WEIGHT: 152.12 LBS | TEMPERATURE: 98.2 F | BODY MASS INDEX: 20.6 KG/M2 | HEIGHT: 72 IN | RESPIRATION RATE: 28 BRPM | SYSTOLIC BLOOD PRESSURE: 108 MMHG | OXYGEN SATURATION: 90 %

## 2024-12-18 PROCEDURE — 99232 SBSQ HOSP IP/OBS MODERATE 35: CPT | Performed by: INTERNAL MEDICINE

## 2024-12-18 PROCEDURE — 120N000001 HC R&B MED SURG/OB

## 2024-12-18 ASSESSMENT — ACTIVITIES OF DAILY LIVING (ADL)
ADLS_ACUITY_SCORE: 66
ADLS_ACUITY_SCORE: 61
ADLS_ACUITY_SCORE: 66
ADLS_ACUITY_SCORE: 66
ADLS_ACUITY_SCORE: 61
ADLS_ACUITY_SCORE: 66
ADLS_ACUITY_SCORE: 61
ADLS_ACUITY_SCORE: 66
ADLS_ACUITY_SCORE: 61
ADLS_ACUITY_SCORE: 66
ADLS_ACUITY_SCORE: 61

## 2024-12-18 NOTE — PLAN OF CARE
"Goal Outcome Evaluation:      Plan of Care Reviewed With: patient    Overall Patient Progress: no changeOverall Patient Progress: no change    Outcome Evaluation: On 2L NC for comfort. Denies pain, nausea and SOB. Incontinent of B/B. Repositioned Q2hr from left to right as pt allowed. Poor oral intake, no vomiting.      Problem: Adult Inpatient Plan of Care  Goal: Plan of Care Review  Description: The Plan of Care Review/Shift note should be completed every shift.  The Outcome Evaluation is a brief statement about your assessment that the patient is improving, declining, or no change.  This information will be displayed automatically on your shift  note.  Outcome: Not Progressing  Flowsheets (Taken 12/18/2024 1728)  Outcome Evaluation: On 2L NC for comfort. Denies pain, nausea and SOB. Incontinent of B/B. Repositioned Q2hr from left to right as pt allowed. Poor oral intake, no vomiting.  Plan of Care Reviewed With: patient  Overall Patient Progress: no change  Goal: Patient-Specific Goal (Individualized)  Description: You can add care plan individualizations to a care plan. Examples of Individualization might be:  \"Parent requests to be called daily at 9am for status\", \"I have a hard time hearing out of my right ear\", or \"Do not touch me to wake me up as it startles  me\".  Outcome: Not Progressing  Goal: Absence of Hospital-Acquired Illness or Injury  Outcome: Not Progressing  Intervention: Identify and Manage Fall Risk  Recent Flowsheet Documentation  Taken 12/18/2024 0758 by Candida Gutierres RN  Safety Promotion/Fall Prevention:   activity supervised   clutter free environment maintained   increased rounding and observation   increase visualization of patient   mobility aid in reach   nonskid shoes/slippers when out of bed   patient and family education   safety round/check completed  Intervention: Prevent Skin Injury  Recent Flowsheet Documentation  Taken 12/18/2024 1400 by Cnadida Gutierres RN  Body " Position:   turned   right  Taken 12/18/2024 0758 by Candida Gutierres RN  Body Position: refuses positioning  Intervention: Prevent and Manage VTE (Venous Thromboembolism) Risk  Recent Flowsheet Documentation  Taken 12/18/2024 0758 by Candida Gutierres RN  VTE Prevention/Management: SCDs off (sequential compression devices)  Goal: Optimal Comfort and Wellbeing  Outcome: Not Progressing  Goal: Readiness for Transition of Care  Outcome: Not Progressing     Problem: Gastrointestinal Bleeding  Goal: Optimal Coping with Acute Illness  Outcome: Not Progressing  Goal: Hemostasis  Outcome: Not Progressing     Problem: Skin Injury Risk Increased  Goal: Skin Health and Integrity  Outcome: Not Progressing  Intervention: Plan: Nurse Driven Intervention: Moisture Management  Recent Flowsheet Documentation  Taken 12/18/2024 0758 by Candida Gutierres RN  Moisture Interventions: Incontinence pad  Intervention: Plan: Nurse Driven Intervention: Friction and Shear  Recent Flowsheet Documentation  Taken 12/18/2024 0758 by Candida Gutierres RN  Friction/Shear Interventions: HOB 30 degrees or less  Intervention: Optimize Skin Protection  Recent Flowsheet Documentation  Taken 12/18/2024 1400 by Candida Gutierres RN  Head of Bed (HOB) Positioning: HOB at 20 degrees  Taken 12/18/2024 0758 by Candida Gutierres RN  Activity Management: activity adjusted per tolerance  Head of Bed (HOB) Positioning: HOB at 20-30 degrees     Problem: Fall Injury Risk  Goal: Absence of Fall and Fall-Related Injury  Outcome: Not Progressing  Intervention: Identify and Manage Contributors  Recent Flowsheet Documentation  Taken 12/18/2024 0758 by Candida Gutierres RN  Medication Review/Management: medications reviewed  Intervention: Promote Injury-Free Environment  Recent Flowsheet Documentation  Taken 12/18/2024 0758 by Candida Gutierres RN  Safety Promotion/Fall Prevention:   activity supervised   clutter free environment maintained   increased rounding  and observation   increase visualization of patient   mobility aid in reach   nonskid shoes/slippers when out of bed   patient and family education   safety round/check completed

## 2024-12-18 NOTE — PLAN OF CARE
"To Do:  End of Shift Summary  For vital signs and complete assessments, please see documentation flowsheets.     Pertinent assessments: A/O. O2 2L NC for comfort. Denies nausea, pain and SOB. Incontinent or urine and stool. Repositioned.  Major Shift Events None.  Treatment Plan: Comfort care, symptom management and awaiting safe discharge  Bedside Nurse: Elizabeth Alba RN       Problem: Adult Inpatient Plan of Care  Goal: Plan of Care Review  Description: The Plan of Care Review/Shift note should be completed every shift.  The Outcome Evaluation is a brief statement about your assessment that the patient is improving, declining, or no change.  This information will be displayed automatically on your shift  note.  Outcome: Not Progressing  Flowsheets (Taken 12/18/2024 0642)  Outcome Evaluation: comfort care  Plan of Care Reviewed With: patient  Overall Patient Progress: no change  Goal: Patient-Specific Goal (Individualized)  Description: You can add care plan individualizations to a care plan. Examples of Individualization might be:  \"Parent requests to be called daily at 9am for status\", \"I have a hard time hearing out of my right ear\", or \"Do not touch me to wake me up as it startles  me\".  Outcome: Not Progressing  Goal: Absence of Hospital-Acquired Illness or Injury  Outcome: Not Progressing  Intervention: Identify and Manage Fall Risk  Recent Flowsheet Documentation  Taken 12/17/2024 2020 by Elizabeth Alba, RN  Safety Promotion/Fall Prevention:   activity supervised   increase visualization of patient   nonskid shoes/slippers when out of bed   safety round/check completed   room near nurse's station   clutter free environment maintained  Intervention: Prevent Skin Injury  Recent Flowsheet Documentation  Taken 12/18/2024 0542 by Elizabeth Alba, RN  Body Position:   turned   left   side-lying  Taken 12/17/2024 2020 by Elizabeth Alba, RN  Body Position: refuses positioning  Goal: Optimal Comfort and " Wellbeing  Outcome: Not Progressing  Goal: Readiness for Transition of Care  Outcome: Not Progressing     Problem: Gastrointestinal Bleeding  Goal: Optimal Coping with Acute Illness  Outcome: Not Progressing  Goal: Hemostasis  Outcome: Not Progressing     Problem: Skin Injury Risk Increased  Goal: Skin Health and Integrity  Outcome: Not Progressing  Intervention: Plan: Nurse Driven Intervention: Moisture Management  Recent Flowsheet Documentation  Taken 12/17/2024 2020 by Elizabeth Alba RN  Bathing/Skin Care: incontinence care  Taken 12/17/2024 2000 by Elizabeth Alba RN  Moisture Interventions:   Perineal cleanser   Incontinence pad  Bathing/Skin Care: foot care  Intervention: Optimize Skin Protection  Recent Flowsheet Documentation  Taken 12/18/2024 0542 by Elizabeth Alba, RN  Activity Management: activity adjusted per tolerance  Head of Bed (HOB) Positioning: HOB at 20-30 degrees  Taken 12/17/2024 2020 by Elizabeth Alba RN  Activity Management: activity adjusted per tolerance  Head of Bed (HOB) Positioning: HOB at 20-30 degrees     Problem: Fall Injury Risk  Goal: Absence of Fall and Fall-Related Injury  Outcome: Not Progressing  Intervention: Identify and Manage Contributors  Recent Flowsheet Documentation  Taken 12/17/2024 2020 by Elizabeth Alba RN  Medication Review/Management: medications reviewed  Intervention: Promote Injury-Free Environment  Recent Flowsheet Documentation  Taken 12/17/2024 2020 by Elizabeth Alba RN  Safety Promotion/Fall Prevention:   activity supervised   increase visualization of patient   nonskid shoes/slippers when out of bed   safety round/check completed   room near nurse's station   clutter free environment maintained   Goal Outcome Evaluation:      Plan of Care Reviewed With: patient    Overall Patient Progress: no changeOverall Patient Progress: no change    Outcome Evaluation: comfort care

## 2024-12-18 NOTE — PROGRESS NOTES
Care Management Follow Up    Length of Stay (days): 7    Expected Discharge Date: 12/18/2024     Concerns to be Addressed:   Disposition    Patient plan of care discussed at interdisciplinary rounds: Yes    Anticipated Discharge Disposition:  LTC with hospice  Anticipated Discharge Services: LTC with hospice   Anticipated Discharge DME:  hospice will provide    Patient/family educated on Medicare website which has current facility and service quality ratings:  yes  Education Provided on the Discharge Plan:  yes  Patient/Family in Agreement with the Plan:  yes    Referrals Placed by CM/SW:  yes  Private pay costs discussed: Not applicable    Discussed  Partnership in Safe Discharge Planning  document with patient/family: Yes:      Handoff Completed: No, handoff not indicated or clinically appropriate    Additional Information:  SW met with patient, brother, Jaziel and sister. Sw reviewed hospice services and philosophy. Discussed options for disposition. Patient is from an assisted living. SW will check if he can return with hospice if not we will look for LTC with hospice. Family would like him to stay is [facility].      Next Steps: SW will make referrals to LTC and assist with disposition with hospice.    KENYA Krause   Inpatient Care Coordination   Supervisor  [facility]  [phone]      KENYA Gandhi

## 2024-12-18 NOTE — PLAN OF CARE
"Assumed care of pt from [id number]. Comfort care. A&Ox4. Minimal PO intake. Voiding intermittently in urinal. Denies pain. No SOB but 2L NC for comfort.    Plan to discharge once stable and placement for hospice is found.        Goal Outcome Evaluation:      Plan of Care Reviewed With: family, patient    Overall Patient Progress: no changeOverall Patient Progress: no change    Outcome Evaluation: comfort care      Problem: Adult Inpatient Plan of Care  Goal: Plan of Care Review  Description: The Plan of Care Review/Shift note should be completed every shift.  The Outcome Evaluation is a brief statement about your assessment that the patient is improving, declining, or no change.  This information will be displayed automatically on your shift  note.  Outcome: Not Progressing  Flowsheets (Taken 12/17/2024 1933)  Outcome Evaluation: comfort care  Plan of Care Reviewed With:   family   patient  Overall Patient Progress: no change  Goal: Patient-Specific Goal (Individualized)  Description: You can add care plan individualizations to a care plan. Examples of Individualization might be:  \"Parent requests to be called daily at 9am for status\", \"I have a hard time hearing out of my right ear\", or \"Do not touch me to wake me up as it startles  me\".  Outcome: Not Progressing  Goal: Absence of Hospital-Acquired Illness or Injury  Outcome: Not Progressing  Intervention: Identify and Manage Fall Risk  Recent Flowsheet Documentation  Taken 12/17/2024 0900 by Rabia Hale, RN  Safety Promotion/Fall Prevention:   activity supervised   assistive device/personal items within reach   nonskid shoes/slippers when out of bed   safety round/check completed  Intervention: Prevent Infection  Recent Flowsheet Documentation  Taken 12/17/2024 1501 by Rabia Hale, RN  Infection Prevention:   hand hygiene promoted   single patient room provided  Taken 12/17/2024 0900 by Rabia Hale, RN  Infection Prevention:   hand hygiene promoted   " single patient room provided  Goal: Optimal Comfort and Wellbeing  Outcome: Not Progressing  Goal: Readiness for Transition of Care  Outcome: Not Progressing

## 2024-12-18 NOTE — PROGRESS NOTES
Care Management Follow Up    Length of Stay (days): 8    Expected Discharge Date: 12/19/2024     Concerns to be Addressed:     Disposition  Patient plan of care discussed at interdisciplinary rounds: Yes    Anticipated Discharge Disposition:  Patient wishes to return to his assisted living on hospice. Message left with [facility] [phone]. [facility]  Anticipated Discharge Services:  Hospice  Anticipated Discharge DME:  Hospice will provide    Patient/family educated on Medicare website which has current facility and service quality ratings:  yes  Education Provided on the Discharge Plan:  yes  Patient/Family in Agreement with the Plan:  yes    Referrals Placed by CM/SW:  yes  Private pay costs discussed: private room/amenity fees and transportation costs    Discussed  Partnership in Safe Discharge Planning  document with patient/family: Yes:      Handoff Completed: No, handoff not indicated or clinically appropriate    Additional Information:  Met with patient and his family. They had stopped at patient's assisted [facility], [facility] and the DON thought they could provide care for patient with hospice. SW spoke with Amalia the FLOR[facility], reviewed patient's care and faxed clinicals. They ae able to take patient back on Friday.    Updated MD, patient and family. Asked for transportation from [facility] and prefer [facility].    Referral sent to [facility].     [facility] [phone] Stretcher transport set for Friday 12/2024 @ 1030.       KENYA Krause   Inpatient Care Coordination   Supervisor  [facility]  [phone]        KENYA Gandhi

## 2024-12-19 LAB
BACTERIA BLD CULT: NORMAL
BACTERIA BLD CULT: NORMAL

## 2024-12-19 PROCEDURE — 99232 SBSQ HOSP IP/OBS MODERATE 35: CPT | Performed by: NURSE PRACTITIONER

## 2024-12-19 PROCEDURE — 99232 SBSQ HOSP IP/OBS MODERATE 35: CPT | Performed by: INTERNAL MEDICINE

## 2024-12-19 PROCEDURE — 120N000001 HC R&B MED SURG/OB

## 2024-12-19 RX ORDER — BISACODYL 10 MG
10 SUPPOSITORY, RECTAL RECTAL
Qty: 2 SUPPOSITORY | Refills: 0 | Status: SHIPPED | OUTPATIENT
Start: 2024-12-19

## 2024-12-19 RX ORDER — HALOPERIDOL 0.5 MG/1
0.5 TABLET ORAL EVERY 6 HOURS PRN
Qty: 15 TABLET | Refills: 0 | Status: SHIPPED | OUTPATIENT
Start: 2024-12-19

## 2024-12-19 RX ORDER — ATROPINE SULFATE 10 MG/ML
2 SOLUTION/ DROPS OPHTHALMIC EVERY 4 HOURS PRN
Qty: 2 ML | Refills: 0 | Status: SHIPPED | OUTPATIENT
Start: 2024-12-19

## 2024-12-19 RX ORDER — LORAZEPAM 1 MG/1
1 TABLET ORAL
Qty: 15 TABLET | Refills: 0 | Status: SHIPPED | OUTPATIENT
Start: 2024-12-19

## 2024-12-19 RX ORDER — HYDROMORPHONE HYDROCHLORIDE 2 MG/1
2 TABLET ORAL
Qty: 15 TABLET | Refills: 0 | Status: SHIPPED | OUTPATIENT
Start: 2024-12-19 | End: 2024-12-22

## 2024-12-19 RX ORDER — ACETAMINOPHEN 650 MG/1
650 SUPPOSITORY RECTAL EVERY 4 HOURS PRN
Qty: 2 SUPPOSITORY | Refills: 0 | Status: SHIPPED | OUTPATIENT
Start: 2024-12-19

## 2024-12-19 ASSESSMENT — ACTIVITIES OF DAILY LIVING (ADL)
ADLS_ACUITY_SCORE: 62
ADLS_ACUITY_SCORE: 66
ADLS_ACUITY_SCORE: 66
ADLS_ACUITY_SCORE: 62
ADLS_ACUITY_SCORE: 66
ADLS_ACUITY_SCORE: 62
ADLS_ACUITY_SCORE: 62
ADLS_ACUITY_SCORE: 66
ADLS_ACUITY_SCORE: 66
ADLS_ACUITY_SCORE: 62

## 2024-12-19 NOTE — PLAN OF CARE
"To Do:  End of Shift Summary  For vital signs and complete assessments, please see documentation flowsheets.     Pertinent assessments: Pt is alert. 2L NC for comfort. Denies pain, SOB, and N/V. Ax2 with anahi steady, not oob this shift. Repositioning Q2. No PIV. Regular diet with thin liquids. Using bedside urinal, incontinent of bowel. BM this shift.    Major Shift Events: uneventful    Treatment Plan: Comfort cares. Discharge 12/20 at 1030.    Bedside Nurse: Ileana Whatley RN     Goal Outcome Evaluation:      Plan of Care Reviewed With: patient    Overall Patient Progress: no changeOverall Patient Progress: no change    Outcome Evaluation: comfort care      Problem: Adult Inpatient Plan of Care  Goal: Plan of Care Review  Description: The Plan of Care Review/Shift note should be completed every shift.  The Outcome Evaluation is a brief statement about your assessment that the patient is improving, declining, or no change.  This information will be displayed automatically on your shift  note.  Outcome: Progressing  Flowsheets (Taken 12/19/2024 5547)  Outcome Evaluation: comfort care  Plan of Care Reviewed With: patient  Overall Patient Progress: no change  Goal: Patient-Specific Goal (Individualized)  Description: You can add care plan individualizations to a care plan. Examples of Individualization might be:  \"Parent requests to be called daily at 9am for status\", \"I have a hard time hearing out of my right ear\", or \"Do not touch me to wake me up as it startles  me\".  Outcome: Progressing  Goal: Absence of Hospital-Acquired Illness or Injury  Outcome: Progressing  Intervention: Identify and Manage Fall Risk  Recent Flowsheet Documentation  Taken 12/19/2024 4238 by Ileana Whatley, [place]  Safety Promotion/Fall Prevention:   activity supervised   clutter free environment maintained   increased rounding and observation   increase visualization of patient   nonskid shoes/slippers when out of bed   room near nurse's station   " safety round/check completed  Intervention: Prevent Skin Injury  Recent Flowsheet Documentation  Taken 12/19/2024 1445 by Ileana Whatley RN  Body Position:   turned   left   supine, head elevated   heels elevated  Taken 12/19/2024 1233 by Ileana Whatley RN  Body Position:   supine, head elevated   turned   right  Taken 12/19/2024 1045 by Ileana Whatley RN  Body Position:   turned   supine, head elevated   right   heels elevated  Taken 12/19/2024 0844 by Ileana Whatley RN  Body Position: (refused repositioning)   position maintained   refuses positioning   other (see comments)  Intervention: Prevent and Manage VTE (Venous Thromboembolism) Risk  Recent Flowsheet Documentation  Taken 12/19/2024 0844 by Ileana Whatley RN  VTE Prevention/Management: SCDs off (sequential compression devices)  Intervention: Prevent Infection  Recent Flowsheet Documentation  Taken 12/19/2024 0844 by Ileana Whatley RN  Infection Prevention:   single patient room provided   rest/sleep promoted   cohorting utilized  Goal: Optimal Comfort and Wellbeing  Outcome: Progressing  Goal: Readiness for Transition of Care  Outcome: Progressing     Problem: Gastrointestinal Bleeding  Goal: Optimal Coping with Acute Illness  Outcome: Progressing  Goal: Hemostasis  Outcome: Progressing     Problem: Skin Injury Risk Increased  Goal: Skin Health and Integrity  Outcome: Progressing  Intervention: Plan: Nurse Driven Intervention: Moisture Management  Recent Flowsheet Documentation  Taken 12/19/2024 1445 by Ileana Whatley RN  Bathing/Skin Care: incontinence care  Taken 12/19/2024 1045 by Ileana Whatley RN  Bathing/Skin Care: incontinence care  Taken 12/19/2024 0844 by Ileana Whatley RN  Moisture Interventions: Incontinence pad  Intervention: Plan: Nurse Driven Intervention: Friction and Shear  Recent Flowsheet Documentation  Taken 12/19/2024 0844 by Ileana Whatley RN  Friction/Shear Interventions: HOB 30 degrees or less  Intervention: Optimize Skin Protection  Recent  Flowsheet Documentation  Taken 12/19/2024 1445 by Ileana Whatley RN  Activity Management: activity adjusted per tolerance  Head of Bed (HOB) Positioning: HOB at 30-45 degrees  Taken 12/19/2024 1233 by Ileana Whatley RN  Activity Management: activity adjusted per tolerance  Head of Bed (HOB) Positioning: HOB at 20-30 degrees  Taken 12/19/2024 1045 by Ileana Whatley RN  Activity Management: activity adjusted per tolerance  Head of Bed (HOB) Positioning: HOB at 20-30 degrees  Taken 12/19/2024 0844 by Ileana Whatley RN  Activity Management: activity adjusted per tolerance  Head of Bed (HOB) Positioning: HOB at 20-30 degrees     Problem: Fall Injury Risk  Goal: Absence of Fall and Fall-Related Injury  Outcome: Progressing  Intervention: Identify and Manage Contributors  Recent Flowsheet Documentation  Taken 12/19/2024 0844 by Ileana Whatley RN  Medication Review/Management: medications reviewed  Intervention: Promote Injury-Free Environment  Recent Flowsheet Documentation  Taken 12/19/2024 0844 by Ileana Whatley RN  Safety Promotion/Fall Prevention:   activity supervised   clutter free environment maintained   increased rounding and observation   increase visualization of patient   nonskid shoes/slippers when out of bed   room near nurse's station   safety round/check completed
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
## 2024-12-19 NOTE — PLAN OF CARE
"To Do:  End of Shift Summary  For vital signs and complete assessments, please see documentation flowsheets.     Pertinent assessments: O2 2L for comfort. Denies pain, nausea and SOB. Repositioned Q2. Incontinent of stool.  Major Shift Events None  Treatment Plan: Comfort care  Bedside Nurse: Elizabeth Alba RN       Problem: Adult Inpatient Plan of Care  Goal: Plan of Care Review  Description: The Plan of Care Review/Shift note should be completed every shift.  The Outcome Evaluation is a brief statement about your assessment that the patient is improving, declining, or no change.  This information will be displayed automatically on your shift  note.  Outcome: Not Progressing  Flowsheets (Taken 12/19/2024 0553)  Outcome Evaluation: comfort care  Plan of Care Reviewed With: patient  Overall Patient Progress: no change  Goal: Patient-Specific Goal (Individualized)  Description: You can add care plan individualizations to a care plan. Examples of Individualization might be:  \"Parent requests to be called daily at 9am for status\", \"I have a hard time hearing out of my right ear\", or \"Do not touch me to wake me up as it startles  me\".  Outcome: Not Progressing  Goal: Absence of Hospital-Acquired Illness or Injury  Outcome: Not Progressing  Intervention: Identify and Manage Fall Risk  Recent Flowsheet Documentation  Taken 12/18/2024 2100 by Elizabeth Alba RN  Safety Promotion/Fall Prevention:   activity supervised   increase visualization of patient   clutter free environment maintained   nonskid shoes/slippers when out of bed   room organization consistent   room near nurse's station  Intervention: Prevent and Manage VTE (Venous Thromboembolism) Risk  Recent Flowsheet Documentation  Taken 12/18/2024 2100 by Elizabeth Alba RN  VTE Prevention/Management: SCDs off (sequential compression devices)  Goal: Optimal Comfort and Wellbeing  Outcome: Not Progressing  Goal: Readiness for Transition of Care  Outcome: Not " Progressing     Problem: Gastrointestinal Bleeding  Goal: Optimal Coping with Acute Illness  Outcome: Not Progressing  Goal: Hemostasis  Outcome: Not Progressing     Problem: Skin Injury Risk Increased  Goal: Skin Health and Integrity  Outcome: Not Progressing  Intervention: Plan: Nurse Driven Intervention: Moisture Management  Recent Flowsheet Documentation  Taken 12/18/2024 2000 by Elizabeth Alba RN  Moisture Interventions: Other (comment)  Bathing/Skin Care: other (see comments)  Intervention: Plan: Nurse Driven Intervention: Friction and Shear  Recent Flowsheet Documentation  Taken 12/18/2024 2100 by Elizabeth Alba RN  Friction/Shear Interventions: HOB 30 degrees or less     Problem: Fall Injury Risk  Goal: Absence of Fall and Fall-Related Injury  Outcome: Not Progressing  Intervention: Identify and Manage Contributors  Recent Flowsheet Documentation  Taken 12/18/2024 2100 by Elizabeth Alba RN  Medication Review/Management: medications reviewed  Intervention: Promote Injury-Free Environment  Recent Flowsheet Documentation  Taken 12/18/2024 2100 by Elizabeth Alba RN  Safety Promotion/Fall Prevention:   activity supervised   increase visualization of patient   clutter free environment maintained   nonskid shoes/slippers when out of bed   room organization consistent   room near nurse's station   Goal Outcome Evaluation:      Plan of Care Reviewed With: patient    Overall Patient Progress: no changeOverall Patient Progress: no change    Outcome Evaluation: comfort care
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
## 2024-12-19 NOTE — PROGRESS NOTES
Care Management Discharge Note    Discharge Date: 12/20/2024       Discharge Disposition:  His assisted living [facility]    Discharge Services:  Hospice    Discharge DME:  Hospice o provide    [facility] sadie @ [facility]. [facility] will drop off O2 for ride tonight, 12/19/24. Updated nursing.    Private pay costs discussed: private room/amenity fees and transportation costs    Does the patient's insurance plan have a 3 day qualifying hospital stay waiver?  No    PAS Confirmation Code:  NA  Patient/family educated on Medicare website which has current facility and service quality ratings:  NA    Education Provided on the Discharge Plan:  yes  Persons Notified of Discharge Plans: patient, family,, hospice and assisted living  Patient/Family in Agreement with the Plan:  yes    Handoff Referral Completed: No, handoff not indicated or clinically appropriate    Additional Information:  Patient will be discharged tomorrow 12/20/24 to his assisted living, [facility]. Patient and family updated on discharge.    Check with facility and hospice, everything is set for patient for tomorrow.    [employer] transportation [phone], sadie @ 1030. Hospice is supplying the O2 for ride and it should get here tonight.    Updated, MD shirlene and RN.    KENYA Krause   Inpatient Care Coordination   Supervisor  [facility]  [phone]      KENYA Gandhi

## 2024-12-20 LAB
BACTERIA BLD CULT: NO GROWTH
BACTERIA BLD CULT: NO GROWTH

## 2024-12-20 PROCEDURE — 99239 HOSP IP/OBS DSCHRG MGMT >30: CPT | Performed by: INTERNAL MEDICINE

## 2024-12-20 ASSESSMENT — ACTIVITIES OF DAILY LIVING (ADL)
ADLS_ACUITY_SCORE: 62
ADLS_ACUITY_SCORE: 61
ADLS_ACUITY_SCORE: 62
ADLS_ACUITY_SCORE: 61

## 2024-12-20 NOTE — PROGRESS NOTES
Oxygen Documentation  I certify that this patient, Jose Vazquez has been under my care (or a nurse practitioner or physican's assistant working with me). This is the face-to-face encounter for oxygen medical necessity.      At the time of this encounter, I have reviewed the qualifying testing and have determined that supplemental oxygen is reasonable and necessary and is expected to improve the patient's condition in a home setting.         Patient has continued oxygen desaturation due to Chronic Respiratory Failure with Hypoxia J96.11.    If portability is ordered, is the patient mobile within the home? yes    Was this visit performed as a telehealth visit: No

## 2024-12-20 NOTE — PLAN OF CARE
"Pertinent assessments: A&Ox3, disoriented to time. On 2L NC for comfort. Up with assist of 2, gait belt, and anahi steady. Did not get out of bed this evening. Tolerating a regular diet. No PIV in place. Incontinent of bowel and bladder. Often times refused repositioning by staff, weight shifting done by patient. Bed alarm on for safety. Denies pain.     Major Shift Events: none.     Treatment Plan: Comfort cares. O2 support. Activity as tolerated. Discharge transportation set up for Friday 12/20.    Bedside Nurse: Eyal Corcoran, RN    Problem: Adult Inpatient Plan of Care  Goal: Plan of Care Review  Description: The Plan of Care Review/Shift note should be completed every shift.  The Outcome Evaluation is a brief statement about your assessment that the patient is improving, declining, or no change.  This information will be displayed automatically on your shift  note.  Outcome: Not Progressing  Flowsheets (Taken 12/19/2024 5749)  Outcome Evaluation: Continue comfort cares.  Plan of Care Reviewed With: patient  Overall Patient Progress: no change  Goal: Patient-Specific Goal (Individualized)  Description: You can add care plan individualizations to a care plan. Examples of Individualization might be:  \"Parent requests to be called daily at 9am for status\", \"I have a hard time hearing out of my right ear\", or \"Do not touch me to wake me up as it startles  me\".  Outcome: Not Progressing  Goal: Absence of Hospital-Acquired Illness or Injury  Outcome: Not Progressing  Intervention: Identify and Manage Fall Risk  Recent Flowsheet Documentation  Taken 12/19/2024 8402 by Eyal Corcoran, RN  Safety Promotion/Fall Prevention:   activity supervised   increase visualization of patient   lighting adjusted   nonskid shoes/slippers when out of bed   patient and family education   room near nurse's station   safety round/check completed   supervised activity   treat reversible contributory factors  Intervention: Prevent Skin " Injury  Recent Flowsheet Documentation  Taken 12/19/2024 1722 by Eyal Corcoran, RN  Body Position: turned  Intervention: Prevent Infection  Recent Flowsheet Documentation  Taken 12/19/2024 1722 by Eyal Corcoran, RN  Infection Prevention:   cohorting utilized   hand hygiene promoted   rest/sleep promoted   single patient room provided  Goal: Optimal Comfort and Wellbeing  Outcome: Not Progressing  Goal: Readiness for Transition of Care  Outcome: Not Progressing     Goal Outcome Evaluation:      Plan of Care Reviewed With: patient    Overall Patient Progress: no change    Overall Patient Progress: no change    Outcome Evaluation: Continue comfort cares.

## 2024-12-20 NOTE — DISCHARGE SUMMARY
Discharge Note    Patient discharged to Assisted Living via transportation service  accompanied by other none .  IV: Discontinued  Prescriptions printed and given to patient/family and filled and given to patient/family.   Belongings reviewed and sent with patient.   Home medications returned to patient: NA  Equipment sent with: N/A.   patient verbalizes understanding of discharge instructions. AVS given to patient.  Additional education completed?  N/A
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
## 2024-12-20 NOTE — PLAN OF CARE
"End of Shift Summary  For vital signs and complete assessments, please see documentation flowsheets.     Pertinent assessments: 2L NC for comfort. Ax2 with anahi steady. Regular diet. Incontinent of bowel and bladder. Repos q2, often refusing. Denying pain.     Major Shift Events uneventful     Treatment Plan: comfort cares. Plan to discharge today at 10:30.   Bedside Nurse: Quyen Montes RN     Goal Outcome Evaluation:      Plan of Care Reviewed With: patient    Overall Patient Progress: no changeOverall Patient Progress: no change    Outcome Evaluation: comfort cares      Problem: Adult Inpatient Plan of Care  Goal: Plan of Care Review  Description: The Plan of Care Review/Shift note should be completed every shift.  The Outcome Evaluation is a brief statement about your assessment that the patient is improving, declining, or no change.  This information will be displayed automatically on your shift  note.  12/20/2024 0646 by Quyen Montes RN  Outcome: Not Progressing  Flowsheets (Taken 12/20/2024 0646)  Outcome Evaluation: comfort cares  Plan of Care Reviewed With: patient  Overall Patient Progress: no change  12/20/2024 0646 by Quyen Montes RN  Outcome: Progressing  Flowsheets (Taken 12/20/2024 0646)  Outcome Evaluation: comfort cares  Plan of Care Reviewed With: patient  Overall Patient Progress: no change  Goal: Patient-Specific Goal (Individualized)  Description: You can add care plan individualizations to a care plan. Examples of Individualization might be:  \"Parent requests to be called daily at 9am for status\", \"I have a hard time hearing out of my right ear\", or \"Do not touch me to wake me up as it startles  me\".  12/20/2024 0646 by Quyen Montes RN  Outcome: Not Progressing  12/20/2024 0646 by Quyen Montes, RN  Outcome: Progressing  Goal: Absence of Hospital-Acquired Illness or Injury  12/20/2024 0646 by Quyen Montes RN  Outcome: Not Progressing  12/20/2024 0646 by Quyen Montes, " RN  Outcome: Progressing  Goal: Optimal Comfort and Wellbeing  12/20/2024 0646 by Quyen Montes RN  Outcome: Not Progressing  12/20/2024 0646 by Quyen Montes RN  Outcome: Progressing  Goal: Readiness for Transition of Care  12/20/2024 0646 by Quyen Montes RN  Outcome: Not Progressing  12/20/2024 0646 by Quyen Montes RN  Outcome: Progressing     Problem: Gastrointestinal Bleeding  Goal: Optimal Coping with Acute Illness  12/20/2024 0646 by Quyen Montes RN  Outcome: Not Progressing  12/20/2024 0646 by Quyen Montes RN  Outcome: Progressing  Goal: Hemostasis  12/20/2024 0646 by Quyen Montes RN  Outcome: Not Progressing  12/20/2024 0646 by Quyen Montes RN  Outcome: Progressing     Problem: Skin Injury Risk Increased  Goal: Skin Health and Integrity  12/20/2024 0646 by Quyen Montes RN  Outcome: Not Progressing  12/20/2024 0646 by Quyen Montes RN  Outcome: Progressing     Problem: Fall Injury Risk  Goal: Absence of Fall and Fall-Related Injury  12/20/2024 0646 by Quyen Montes RN  Outcome: Not Progressing  12/20/2024 0646 by Quyen Montes RN  Outcome: Progressing

## 2025-05-12 NOTE — PLAN OF CARE
3/10/21 07-19  Cognitive Concerns/ Orientation: A&O x3, disoriented to situation, forgetful. Calm and cooperative. Flat affect  ABNL VS/O2: VSS on RA  MOBILITY: SBA with walker/GB  PAIN MANAGMENT: Denies  DIET: 2g Na/low sat fat, good appetite, NEEDS ASSISTANCE in ordering food  BOWEL/BLADDER: Continent.   ABNL LAB/BG: N/A  DRAIN/DEVICES: No IV access  SKIN: bruised   TESTS/PROCEDURES: N/A   D/C DAY/GOALS/PLACE:  Psych eval this am. Will be dischRGED TO tcu TODAY OR TOMORROW.  OTHER IMPORTANT INFO: SW following , brother Jaziel  contacted and is here visiting.  door alarm in place.       Yes